# Patient Record
Sex: MALE | Race: WHITE | NOT HISPANIC OR LATINO | Employment: UNEMPLOYED | URBAN - METROPOLITAN AREA
[De-identification: names, ages, dates, MRNs, and addresses within clinical notes are randomized per-mention and may not be internally consistent; named-entity substitution may affect disease eponyms.]

---

## 2018-01-12 NOTE — MISCELLANEOUS
Discussion/Summary  Discussion Summary:   NARENDRA visit was canceled by patient  Chief Complaint  Chief Complaint Free Text Note Form: NARENDAR: PT was admitted to [facility] from 11/13/2016 through 11/15/2016  Dx: Abscess of left hand  I&D  Spoke with pt who reports he is better and doing ok  Denies hand pain, or fever  Incision is covered, dry and clean  Follow up with pcp scheduled for 11/25/2016 @ 10am       History of Present Illness  TCM Communication [facility]: The patient is being contacted for follow-up after hospitalization and 11/15/2016  He was hospitalized at [facility]  The dates of hospitalization:, date of admission: 11/13/2016, date of discharge: 11/15/2016  Diagnosis: see note  He was discharged to home  Medications were not reviewed today  He scheduled a follow up appointment  Counseling was provided to the patient  Topics counseled included importance of compliance with treatment  Communication performed and completed by Shannon Acevedo      Active Problems    1  Dyshidrotic eczema (705 81) (L30 1)   2  Elevated LFTs (790 6) (R79 89)   3  Essential hypertension (401 9) (I10)   4  Hyperlipemia (272 4) (E78 5)    Family History  Mother    1  Family history of hypertension (V17 49) (Z82 49)    Social History    · Alcohol ingestion, 1-4 drinks/week (V69 8) (Z78 9)   · Current every day smoker (305 1) (F17 200)   ·     Current Meds   1  Losartan Potassium-HCTZ 50-12 5 MG Oral Tablet; take 1 tablet by mouth every day; Therapy: 62Urv7931 to (Evaluate:90Jua1643)  Requested for: 05BQJ3877; Last   Rx:[id number] Ordered    Allergies    1  No Known Drug Allergies    Future Appointments    Date/Time Provider Specialty Site   12/22/2016 07:30 AM URIAH Rodriguez  Family Medicine [address] [place] [address]     Signatures   Electronically signed by :  URIAH Jung ; Dec  4 2016 11:49PM EST                       (Author)

## 2018-01-14 NOTE — PROCEDURES
Procedures by Rosemary Carter MD at 11/14/2016  1:08 PM      Author:  Rosemary Carter MD Service:  Orthopedics Author Type:  Physician     Filed:  11/14/2016  1:11 PM Date of Service:  11/14/2016  1:08 PM Status:  Signed     :  Rosemary Carter MD (Physician)            I discussed lancing the superficial abscess with the patient  I discussed the procedure in detail including risks, benefits and alternatives  Risks include allergic reaction to anesthetic agent,  bleeding, persistent infection requiring formal debridement  Verbal consent was obtained  The area was prepped with iodine swab stick and alcohol  2mL of 1% lidocaine was infiltrated subcutaneously  An 11 blade scalpel was used to heather the central  area of the wound  There was a small amount of purulent drainage expressed  30mL of saline was irrigated through the site  A sterile bandage was applied  The patient tolerated the procedure without complication  Justus ALEXANDER    Nov 14 2016  1:12PM [facility] Standard Time

## 2018-01-18 NOTE — PROGRESS NOTES
Assessment    1  Dyshidrotic eczema (345 81) (L30 1)   2  Essential hypertension (401 9) (I10)    Plan  Dyshidrotic eczema    · Fluocinonide 0 05 % External Cream; APPLY SPARINGLY TO AFFECTED  AREA(S) 3 TIMES A DAY  Dyshidrotic eczema, Essential hypertension    · (1) CBC/ PLT (NO DIFF); Status:Active; Requested for:86Iml6689;    · (1) COMPREHENSIVE METABOLIC PANEL; Status:Active; Requested for:25Bkj8176;    · (1) HEMOGLOBIN A1C; Status:Active; Requested for:60Nvd9604;    · (1) LIPID PANEL FASTING W DIRECT LDL REFLEX; Status:Active; Requested  for:23Aug2016;    · (1) TSH; Status:Active; Requested for:23Aug2016;   Essential hypertension    · Losartan Potassium-HCTZ 50-12 5 MG Oral Tablet; take 1 tablet by mouth every  day    Discussion/Summary  Discussion Summary:   Patient presents to establish some to the practice  He was not seen by our office for over 10 years  Hypertension  Blood pressure is elevated  Patient is asymptomatic  He is overweight, smokes few cigarettes daily, not interested in quitting at present time  Strong family history of hypertension  We will proceed with blood work as outlined above  We will start him on Hyzaar  Mechanism of action and side effects discussed  Should patient develop any orthostatic lightheadedness and fatigue-he will contact me immediately  Dyshidrotic eczema-we will try high potency steroid cream   Follow-up pending blood work results and in one month's period  Counseling Documentation With Imm: The patient was counseled regarding instructions for management, risk factor reductions, patient and family education, impressions, risks and benefits of treatment options, importance of compliance with treatment  Chief Complaint  Chief Complaint Free Text Note Form: Pt is here for an appt to become re-established with the practice  Pt states that he had been having problems with HTN a few years ago and wanted to be checked again   Pt states that he has been feeling well though  All meds/allergies reviewed with pt  History of Present Illness  HPI: elevated B/P during recent CDL physical   Reportedly patient's systolic blood pressure was up to 180 at the time  Patient admits to significant weight gain being a   He recently quit this job  He denies chest pain, palpitations, shortness of breath, dizziness or headaches  No recent blood work no daily medications  Strong family history of hypertension, mother and sister  Patient is also complaining of pruritic rash B/L palms , left > Right and bilateral inner ankles  Hypertension (Follow-Up): The patient presents for follow-up of essential hypertension  Symptoms:      Review of Systems  Complete-Male:   Constitutional: recent weight gain, but as noted in HPI, not feeling poorly and not feeling tired  Eyes: No complaints of eye pain, no red eyes, no discharge from eyes, no itchy eyes  ENT: no complaints of earache, no hearing loss, no nosebleeds, no nasal discharge, no sore throat, no hoarseness  Cardiovascular: No complaints of slow heart rate, no fast heart rate, no chest pain, no palpitations, no leg claudication, no lower extremity  Respiratory: No complaints of shortness of breath, no wheezing, no cough, no SOB on exertion, no orthopnea or PND  Gastrointestinal: No complaints of abdominal pain, no constipation, no nausea or vomiting, no diarrhea or bloody stools  Genitourinary: No complaints of dysuria, no incontinence, no hesitancy, no nocturia, no genital lesion, no testicular pain  Musculoskeletal: No complaints of arthralgia, no myalgias, no joint swelling or stiffness, no limb pain or swelling  Integumentary: a rash and itching, but as noted in HPI  Neurological: No compliants of headache, no confusion, no convulsions, no numbness or tingling, no dizziness or fainting, no limb weakness, no difficulty walking     Psychiatric: Is not suicidal, no sleep disturbances, no anxiety or depression, no change in personality, no emotional problems  Endocrine: No complaints of proptosis, no hot flashes, no muscle weakness, no erectile dysfunction, no deepening of the voice, no feelings of weakness  Hematologic/Lymphatic: No complaints of swollen glands, no swollen glands in the neck, does not bleed easily, no easy bruising  Past Medical History  Active Problems And Past Medical History Reviewed: The active problems and past medical history were reviewed and updated today  Family History  Mother    1  Family history of hypertension (V17 49) (Z82 49)  Family History Reviewed: The family history was reviewed and updated today  Social History    · Alcohol ingestion, 1-4 drinks/week (V69 8) (Z72 89)   · Current every day smoker (305 1) (F17 200)   ·   Social History Reviewed: The social history was reviewed and updated today  Current Meds   1  No Reported Medications Recorded    Allergies    1  No Known Drug Allergies    Vitals  Vital Signs    Recorded: 16Muq7784 11:46AM Recorded: 77TCC6946 14:69UW   Systolic 370 150   Diastolic 90 84   Heart Rate  72   Temperature  98 5 F   Height  5 ft 6 in   Weight  199 lb 2 08 oz   BMI Calculated  32 14   BSA Calculated  2     Physical Exam    Constitutional   General appearance: No acute distress, well appearing and well nourished  well developed and overweight  Eyes   Conjunctiva and lids: No swelling, erythema, or discharge  Ears, Nose, Mouth, and Throat   Oropharynx: Normal with no erythema, edema, exudate or lesions  Pulmonary   Respiratory effort: No increased work of breathing or signs of respiratory distress  Auscultation of lungs: Clear to auscultation, equal breath sounds bilaterally, no wheezes, no rales, no rhonci  Cardiovascular   Auscultation of heart: Normal rate and rhythm, normal S1 and S2, without murmurs      Examination of extremities for edema and/or varicosities: Normal     Carotid pulses: Normal  Abdomen   Abdomen: Non-tender, no masses  Liver and spleen: No hepatomegaly or splenomegaly  Musculoskeletal   Gait and station: Normal     Skin   Skin and subcutaneous tissue: Abnormal   Scaly pruritic dyshidrotic eczema bilateral palms, eczema pruritic excoriated rash bilateral inner ankles  Neurologic   Cranial nerves: Cranial nerves 2-12 intact  Psychiatric   Orientation to person, place and time: Normal     Mood and affect: Normal          Results/Data  PHQ-2 Adult Depression Screening 89Kgi0891 11:02AM User, s     Test Name Result Flag Reference   PHQ-2 Adult Depression Score 0     Over the last two weeks, how often have you been bothered by any of the following problems? Little interest or pleasure in doing things: Not at all - 0  Feeling down, depressed, or hopeless: Not at all - 0   PHQ-2 Adult Depression Screening Negative         Future Appointments    Date/Time Provider Specialty Site   09/22/2016 07:30 AM URIAH Sprague  Family Medicine [address] [place] [address]     Signatures   Electronically signed by :  URIAH Cordero ; Aug 24 2016 11:40PM EST                       (Author)

## 2018-03-15 DIAGNOSIS — I10 ESSENTIAL HYPERTENSION: Primary | ICD-10-CM

## 2018-03-16 RX ORDER — LOSARTAN POTASSIUM AND HYDROCHLOROTHIAZIDE 12.5; 5 MG/1; MG/1
1 TABLET ORAL DAILY
Qty: 15 TABLET | Refills: 0 | Status: SHIPPED | OUTPATIENT
Start: 2018-03-16 | End: 2021-08-06

## 2018-03-16 NOTE — TELEPHONE ENCOUNTER
Please contact patient  I received refill request from his pharmacy regarding medication for hypertension  I have not seen him in the office since 2016  I cannot continue refilling blood pressure medication without current follow-up in office visit    Please clarify and  let me know

## 2021-07-30 ENCOUNTER — APPOINTMENT (EMERGENCY)
Dept: CT IMAGING | Facility: HOSPITAL | Age: 46
DRG: 897 | End: 2021-07-30

## 2021-07-30 ENCOUNTER — HOSPITAL ENCOUNTER (INPATIENT)
Facility: HOSPITAL | Age: 46
LOS: 2 days | Discharge: HOME | DRG: 897 | End: 2021-08-02
Attending: EMERGENCY MEDICINE | Admitting: INTERNAL MEDICINE

## 2021-07-30 DIAGNOSIS — F10.239 ALCOHOL WITHDRAWAL SEIZURE (HCC): ICD-10-CM

## 2021-07-30 DIAGNOSIS — F10.239 ALCOHOL WITHDRAWAL (HCC): ICD-10-CM

## 2021-07-30 DIAGNOSIS — R56.9 ALCOHOL WITHDRAWAL SEIZURE (HCC): ICD-10-CM

## 2021-07-30 DIAGNOSIS — I10 ESSENTIAL HYPERTENSION: ICD-10-CM

## 2021-07-30 DIAGNOSIS — R56.9 SEIZURE (HCC): Primary | ICD-10-CM

## 2021-07-30 LAB
ALBUMIN SERPL BCP-MCNC: 4.2 G/DL (ref 3.5–5)
ALP SERPL-CCNC: 246 U/L (ref 46–116)
ALT SERPL W P-5'-P-CCNC: 81 U/L (ref 12–78)
ANION GAP SERPL CALCULATED.3IONS-SCNC: 15 MMOL/L (ref 4–13)
AST SERPL W P-5'-P-CCNC: 106 U/L (ref 5–45)
BASOPHILS # BLD AUTO: 0.05 THOUSANDS/ΜL (ref 0–0.1)
BASOPHILS NFR BLD AUTO: 1 % (ref 0–1)
BILIRUB SERPL-MCNC: 3.38 MG/DL (ref 0.2–1)
BUN SERPL-MCNC: 8 MG/DL (ref 5–25)
CALCIUM SERPL-MCNC: 9.5 MG/DL (ref 8.3–10.1)
CHLORIDE SERPL-SCNC: 94 MMOL/L (ref 100–108)
CO2 SERPL-SCNC: 24 MMOL/L (ref 21–32)
CREAT SERPL-MCNC: 1.06 MG/DL (ref 0.6–1.3)
EOSINOPHIL # BLD AUTO: 0.07 THOUSAND/ΜL (ref 0–0.61)
EOSINOPHIL NFR BLD AUTO: 1 % (ref 0–6)
ERYTHROCYTE [DISTWIDTH] IN BLOOD BY AUTOMATED COUNT: 12.7 % (ref 11.6–15.1)
ETHANOL SERPL-MCNC: <3 MG/DL (ref 0–3)
GFR SERPL CREATININE-BSD FRML MDRD: 83 ML/MIN/1.73SQ M
GLUCOSE SERPL-MCNC: 269 MG/DL (ref 65–140)
GLUCOSE SERPL-MCNC: 273 MG/DL (ref 65–140)
HCT VFR BLD AUTO: 44.3 % (ref 36.5–49.3)
HGB BLD-MCNC: 16.5 G/DL (ref 12–17)
HOLD SPECIMEN: NORMAL
IMM GRANULOCYTES # BLD AUTO: 0.05 THOUSAND/UL (ref 0–0.2)
IMM GRANULOCYTES NFR BLD AUTO: 1 % (ref 0–2)
LIPASE SERPL-CCNC: 196 U/L (ref 73–393)
LYMPHOCYTES # BLD AUTO: 1.55 THOUSANDS/ΜL (ref 0.6–4.47)
LYMPHOCYTES NFR BLD AUTO: 17 % (ref 14–44)
MCH RBC QN AUTO: 36.5 PG (ref 26.8–34.3)
MCHC RBC AUTO-ENTMCNC: 37.2 G/DL (ref 31.4–37.4)
MCV RBC AUTO: 98 FL (ref 82–98)
MONOCYTES # BLD AUTO: 0.41 THOUSAND/ΜL (ref 0.17–1.22)
MONOCYTES NFR BLD AUTO: 4 % (ref 4–12)
NEUTROPHILS # BLD AUTO: 7.16 THOUSANDS/ΜL (ref 1.85–7.62)
NEUTS SEG NFR BLD AUTO: 76 % (ref 43–75)
NRBC BLD AUTO-RTO: 0 /100 WBCS
PLATELET # BLD AUTO: 219 THOUSANDS/UL (ref 149–390)
PMV BLD AUTO: 9.2 FL (ref 8.9–12.7)
POTASSIUM SERPL-SCNC: 3.3 MMOL/L (ref 3.5–5.3)
PROT SERPL-MCNC: 8 G/DL (ref 6.4–8.2)
RBC # BLD AUTO: 4.52 MILLION/UL (ref 3.88–5.62)
SODIUM SERPL-SCNC: 133 MMOL/L (ref 136–145)
TROPONIN I SERPL-MCNC: <0.02 NG/ML
WBC # BLD AUTO: 9.29 THOUSAND/UL (ref 4.31–10.16)

## 2021-07-30 PROCEDURE — 85025 COMPLETE CBC W/AUTO DIFF WBC: CPT | Performed by: EMERGENCY MEDICINE

## 2021-07-30 PROCEDURE — 80061 LIPID PANEL: CPT | Performed by: NURSE PRACTITIONER

## 2021-07-30 PROCEDURE — 82077 ASSAY SPEC XCP UR&BREATH IA: CPT | Performed by: EMERGENCY MEDICINE

## 2021-07-30 PROCEDURE — 84484 ASSAY OF TROPONIN QUANT: CPT | Performed by: EMERGENCY MEDICINE

## 2021-07-30 PROCEDURE — 96365 THER/PROPH/DIAG IV INF INIT: CPT

## 2021-07-30 PROCEDURE — 93005 ELECTROCARDIOGRAM TRACING: CPT

## 2021-07-30 PROCEDURE — 83036 HEMOGLOBIN GLYCOSYLATED A1C: CPT | Performed by: NURSE PRACTITIONER

## 2021-07-30 PROCEDURE — 82948 REAGENT STRIP/BLOOD GLUCOSE: CPT

## 2021-07-30 PROCEDURE — 99285 EMERGENCY DEPT VISIT HI MDM: CPT | Performed by: EMERGENCY MEDICINE

## 2021-07-30 PROCEDURE — 99285 EMERGENCY DEPT VISIT HI MDM: CPT

## 2021-07-30 PROCEDURE — 36415 COLL VENOUS BLD VENIPUNCTURE: CPT

## 2021-07-30 PROCEDURE — 96375 TX/PRO/DX INJ NEW DRUG ADDON: CPT

## 2021-07-30 PROCEDURE — 80053 COMPREHEN METABOLIC PANEL: CPT | Performed by: EMERGENCY MEDICINE

## 2021-07-30 PROCEDURE — 83690 ASSAY OF LIPASE: CPT | Performed by: EMERGENCY MEDICINE

## 2021-07-30 PROCEDURE — 70450 CT HEAD/BRAIN W/O DYE: CPT

## 2021-07-30 RX ORDER — PHENOBARBITAL SODIUM 65 MG/ML
65 INJECTION INTRAMUSCULAR ONCE
Status: COMPLETED | OUTPATIENT
Start: 2021-07-30 | End: 2021-07-30

## 2021-07-30 RX ORDER — DIAZEPAM 5 MG/1
10 TABLET ORAL ONCE
Status: COMPLETED | OUTPATIENT
Start: 2021-07-30 | End: 2021-07-30

## 2021-07-30 RX ADMIN — DIAZEPAM 10 MG: 5 TABLET ORAL at 23:33

## 2021-07-30 RX ADMIN — SODIUM CHLORIDE 1000 ML: 0.9 INJECTION, SOLUTION INTRAVENOUS at 23:34

## 2021-07-30 RX ADMIN — THIAMINE HYDROCHLORIDE 500 MG: 100 INJECTION, SOLUTION INTRAMUSCULAR; INTRAVENOUS at 23:34

## 2021-07-30 RX ADMIN — PHENOBARBITAL SODIUM 65 MG: 65 INJECTION INTRAMUSCULAR; INTRAVENOUS at 23:33

## 2021-07-31 PROBLEM — E87.29 INCREASED ANION GAP METABOLIC ACIDOSIS: Status: ACTIVE | Noted: 2021-07-31

## 2021-07-31 PROBLEM — E87.6 HYPOKALEMIA: Status: ACTIVE | Noted: 2021-07-31

## 2021-07-31 PROBLEM — R73.9 ELEVATED SERUM GLUCOSE: Status: ACTIVE | Noted: 2021-07-31

## 2021-07-31 PROBLEM — E87.2 INCREASED ANION GAP METABOLIC ACIDOSIS: Status: ACTIVE | Noted: 2021-07-31

## 2021-07-31 PROBLEM — F10.939 ALCOHOL WITHDRAWAL SEIZURE (HCC): Status: ACTIVE | Noted: 2021-07-31

## 2021-07-31 PROBLEM — F10.239 ALCOHOL WITHDRAWAL SEIZURE (HCC): Status: ACTIVE | Noted: 2021-07-31

## 2021-07-31 PROBLEM — R56.9 ALCOHOL WITHDRAWAL SEIZURE (HCC): Status: ACTIVE | Noted: 2021-07-31

## 2021-07-31 PROBLEM — R74.01 TRANSAMINITIS: Status: ACTIVE | Noted: 2021-07-31

## 2021-07-31 PROBLEM — R79.89 ELEVATED LFTS: Status: ACTIVE | Noted: 2021-07-31

## 2021-07-31 LAB
ALBUMIN SERPL BCP-MCNC: 2.7 G/DL (ref 3.5–5)
ALP SERPL-CCNC: 195 U/L (ref 46–116)
ALT SERPL W P-5'-P-CCNC: 68 U/L (ref 12–78)
ANION GAP SERPL CALCULATED.3IONS-SCNC: 10 MMOL/L (ref 4–13)
AST SERPL W P-5'-P-CCNC: 91 U/L (ref 5–45)
BASOPHILS # BLD AUTO: 0.04 THOUSANDS/ΜL (ref 0–0.1)
BASOPHILS NFR BLD AUTO: 1 % (ref 0–1)
BILIRUB SERPL-MCNC: 2.36 MG/DL (ref 0.2–1)
BUN SERPL-MCNC: 7 MG/DL (ref 5–25)
CALCIUM ALBUM COR SERPL-MCNC: 9.9 MG/DL (ref 8.3–10.1)
CALCIUM SERPL-MCNC: 8.9 MG/DL (ref 8.3–10.1)
CHLORIDE SERPL-SCNC: 100 MMOL/L (ref 100–108)
CHOLEST SERPL-MCNC: 327 MG/DL (ref 50–200)
CO2 SERPL-SCNC: 26 MMOL/L (ref 21–32)
CREAT SERPL-MCNC: 1.04 MG/DL (ref 0.6–1.3)
EOSINOPHIL # BLD AUTO: 0.06 THOUSAND/ΜL (ref 0–0.61)
EOSINOPHIL NFR BLD AUTO: 1 % (ref 0–6)
ERYTHROCYTE [DISTWIDTH] IN BLOOD BY AUTOMATED COUNT: 12.8 % (ref 11.6–15.1)
EST. AVERAGE GLUCOSE BLD GHB EST-MCNC: 123 MG/DL
GFR SERPL CREATININE-BSD FRML MDRD: 85 ML/MIN/1.73SQ M
GLUCOSE SERPL-MCNC: 139 MG/DL (ref 65–140)
GLUCOSE SERPL-MCNC: 141 MG/DL (ref 65–140)
GLUCOSE SERPL-MCNC: 149 MG/DL (ref 65–140)
GLUCOSE SERPL-MCNC: 157 MG/DL (ref 65–140)
GLUCOSE SERPL-MCNC: 167 MG/DL (ref 65–140)
GLUCOSE SERPL-MCNC: 190 MG/DL (ref 65–140)
HAV IGM SER QL: NORMAL
HBA1C MFR BLD: 5.9 %
HBV CORE IGM SER QL: NORMAL
HBV SURFACE AG SER QL: NORMAL
HCT VFR BLD AUTO: 39.7 % (ref 36.5–49.3)
HCV AB SER QL: NORMAL
HDLC SERPL-MCNC: 81 MG/DL
HGB BLD-MCNC: 14 G/DL (ref 12–17)
IMM GRANULOCYTES # BLD AUTO: 0.03 THOUSAND/UL (ref 0–0.2)
IMM GRANULOCYTES NFR BLD AUTO: 0 % (ref 0–2)
LDLC SERPL CALC-MCNC: 192 MG/DL (ref 0–100)
LYMPHOCYTES # BLD AUTO: 1.39 THOUSANDS/ΜL (ref 0.6–4.47)
LYMPHOCYTES NFR BLD AUTO: 21 % (ref 14–44)
MCH RBC QN AUTO: 35.6 PG (ref 26.8–34.3)
MCHC RBC AUTO-ENTMCNC: 35.3 G/DL (ref 31.4–37.4)
MCV RBC AUTO: 101 FL (ref 82–98)
MONOCYTES # BLD AUTO: 0.38 THOUSAND/ΜL (ref 0.17–1.22)
MONOCYTES NFR BLD AUTO: 6 % (ref 4–12)
NEUTROPHILS # BLD AUTO: 4.77 THOUSANDS/ΜL (ref 1.85–7.62)
NEUTS SEG NFR BLD AUTO: 71 % (ref 43–75)
NRBC BLD AUTO-RTO: 0 /100 WBCS
PLATELET # BLD AUTO: 166 THOUSANDS/UL (ref 149–390)
PMV BLD AUTO: 9.5 FL (ref 8.9–12.7)
POTASSIUM SERPL-SCNC: 3.1 MMOL/L (ref 3.5–5.3)
PROT SERPL-MCNC: 6.6 G/DL (ref 6.4–8.2)
RBC # BLD AUTO: 3.93 MILLION/UL (ref 3.88–5.62)
SODIUM SERPL-SCNC: 136 MMOL/L (ref 136–145)
TRIGL SERPL-MCNC: 269 MG/DL
WBC # BLD AUTO: 6.67 THOUSAND/UL (ref 4.31–10.16)

## 2021-07-31 PROCEDURE — 80074 ACUTE HEPATITIS PANEL: CPT | Performed by: NURSE PRACTITIONER

## 2021-07-31 PROCEDURE — 99448 NTRPROF PH1/NTRNET/EHR 21-30: CPT | Performed by: EMERGENCY MEDICINE

## 2021-07-31 PROCEDURE — 36415 COLL VENOUS BLD VENIPUNCTURE: CPT | Performed by: NURSE PRACTITIONER

## 2021-07-31 PROCEDURE — 96367 TX/PROPH/DG ADDL SEQ IV INF: CPT

## 2021-07-31 PROCEDURE — 82948 REAGENT STRIP/BLOOD GLUCOSE: CPT

## 2021-07-31 PROCEDURE — 85025 COMPLETE CBC W/AUTO DIFF WBC: CPT | Performed by: NURSE PRACTITIONER

## 2021-07-31 PROCEDURE — 96361 HYDRATE IV INFUSION ADD-ON: CPT

## 2021-07-31 PROCEDURE — 99223 1ST HOSP IP/OBS HIGH 75: CPT | Performed by: HOSPITALIST

## 2021-07-31 PROCEDURE — 80053 COMPREHEN METABOLIC PANEL: CPT | Performed by: NURSE PRACTITIONER

## 2021-07-31 RX ORDER — PHENOBARBITAL SODIUM 65 MG/ML
65 INJECTION INTRAMUSCULAR ONCE
Status: DISCONTINUED | OUTPATIENT
Start: 2021-07-31 | End: 2021-07-31

## 2021-07-31 RX ORDER — POTASSIUM CHLORIDE 20 MEQ/1
40 TABLET, EXTENDED RELEASE ORAL ONCE
Status: COMPLETED | OUTPATIENT
Start: 2021-07-31 | End: 2021-07-31

## 2021-07-31 RX ORDER — AMLODIPINE BESYLATE 5 MG/1
5 TABLET ORAL DAILY
Status: DISCONTINUED | OUTPATIENT
Start: 2021-07-31 | End: 2021-08-02 | Stop reason: HOSPADM

## 2021-07-31 RX ADMIN — POTASSIUM CHLORIDE 40 MEQ: 1500 TABLET, EXTENDED RELEASE ORAL at 04:41

## 2021-07-31 RX ADMIN — INSULIN LISPRO 1 UNITS: 100 INJECTION, SOLUTION INTRAVENOUS; SUBCUTANEOUS at 17:21

## 2021-07-31 RX ADMIN — AMLODIPINE BESYLATE 5 MG: 5 TABLET ORAL at 09:49

## 2021-07-31 RX ADMIN — INSULIN LISPRO 1 UNITS: 100 INJECTION, SOLUTION INTRAVENOUS; SUBCUTANEOUS at 22:19

## 2021-07-31 RX ADMIN — INSULIN LISPRO 1 UNITS: 100 INJECTION, SOLUTION INTRAVENOUS; SUBCUTANEOUS at 12:12

## 2021-07-31 RX ADMIN — FOLIC ACID 1 MG: 5 INJECTION, SOLUTION INTRAMUSCULAR; INTRAVENOUS; SUBCUTANEOUS at 00:42

## 2021-07-31 RX ADMIN — THIAMINE HYDROCHLORIDE: 100 INJECTION, SOLUTION INTRAMUSCULAR; INTRAVENOUS at 09:51

## 2021-07-31 RX ADMIN — POTASSIUM CHLORIDE 40 MEQ: 1500 TABLET, EXTENDED RELEASE ORAL at 09:49

## 2021-07-31 NOTE — ASSESSMENT & PLAN NOTE
· Patient denies history DM  · Serum glucose of 273 on CMP  · Obtain A1c  · Accu-checks and SSI  · Hypoglycemia protocol

## 2021-07-31 NOTE — PLAN OF CARE
Problem: Potential for Falls  Goal: Patient will remain free of falls  Description: INTERVENTIONS:  - Educate patient/family on patient safety including physical limitations  - Instruct patient to call for assistance with activity   - Consult OT/PT to assist with strengthening/mobility   - Keep Call bell within reach  - Keep bed low and locked with side rails adjusted as appropriate  - Keep care items and personal belongings within reach  - Initiate and maintain comfort rounds  - Make Fall Risk Sign visible to staff  - Offer Toileting every  Hours, in advance of need  - Initiate/Maintain alarm  - Obtain necessary fall risk management equipment:   - Apply yellow socks and bracelet for high fall risk patients  - Consider moving patient to room near nurses station  Outcome: Progressing     Problem: MOBILITY - ADULT  Goal: Maintain or return to baseline ADL function  Description: INTERVENTIONS:  -  Assess patient's ability to carry out ADLs; assess patient's baseline for ADL function and identify physical deficits which impact ability to perform ADLs (bathing, care of mouth/teeth, toileting, grooming, dressing, etc )  - Assess/evaluate cause of self-care deficits   - Assess range of motion  - Assess patient's mobility; develop plan if impaired  - Assess patient's need for assistive devices and provide as appropriate  - Encourage maximum independence but intervene and supervise when necessary  - Involve family in performance of ADLs  - Assess for home care needs following discharge   - Consider OT consult to assist with ADL evaluation and planning for discharge  - Provide patient education as appropriate  Outcome: Progressing  Goal: Maintains/Returns to pre admission functional level  Description: INTERVENTIONS:  - Perform BMAT or MOVE assessment daily    - Set and communicate daily mobility goal to care team and patient/family/caregiver     - Collaborate with rehabilitation services on mobility goals if consulted  - Perform Range of Motion  times a day  - Reposition patient every  hours    - Dangle patient  times a day  - Stand patient  times a day  - Ambulate patient  times a day  - Out of bed to chair  times a day   - Out of bed for meals  times a day  - Out of bed for toileting  - Record patient progress and toleration of activity level   Outcome: Progressing     Problem: PAIN - ADULT  Goal: Verbalizes/displays adequate comfort level or baseline comfort level  Description: Interventions:  - Encourage patient to monitor pain and request assistance  - Assess pain using appropriate pain scale  - Administer analgesics based on type and severity of pain and evaluate response  - Implement non-pharmacological measures as appropriate and evaluate response  - Consider cultural and social influences on pain and pain management  - Notify physician/advanced practitioner if interventions unsuccessful or patient reports new pain  Outcome: Progressing     Problem: INFECTION - ADULT  Goal: Absence or prevention of progression during hospitalization  Description: INTERVENTIONS:  - Assess and monitor for signs and symptoms of infection  - Monitor lab/diagnostic results  - Monitor all insertion sites, i e  indwelling lines, tubes, and drains  - Monitor endotracheal if appropriate and nasal secretions for changes in amount and color  - [place] appropriate cooling/warming therapies per order  - Administer medications as ordered  - Instruct and encourage patient and family to use good hand hygiene technique  - Identify and instruct in appropriate isolation precautions for identified infection/condition  Outcome: Progressing  Goal: Absence of fever/infection during neutropenic period  Description: INTERVENTIONS:  - Monitor WBC    Outcome: Progressing     Problem: SAFETY ADULT  Goal: Patient will remain free of falls  Description: INTERVENTIONS:  - Educate patient/family on patient safety including physical limitations  - Instruct patient to call for assistance with activity   - Consult OT/PT to assist with strengthening/mobility   - Keep Call bell within reach  - Keep bed low and locked with side rails adjusted as appropriate  - Keep care items and personal belongings within reach  - Initiate and maintain comfort rounds  - Make Fall Risk Sign visible to staff  - Offer Toileting every  Hours, in advance of need  - Initiate/Maintain alarm  - Obtain necessary fall risk management equipment:   - Apply yellow socks and bracelet for high fall risk patients  - Consider moving patient to room near nurses station  Outcome: Progressing  Goal: Maintain or return to baseline ADL function  Description: INTERVENTIONS:  -  Assess patient's ability to carry out ADLs; assess patient's baseline for ADL function and identify physical deficits which impact ability to perform ADLs (bathing, care of mouth/teeth, toileting, grooming, dressing, etc )  - Assess/evaluate cause of self-care deficits   - Assess range of motion  - Assess patient's mobility; develop plan if impaired  - Assess patient's need for assistive devices and provide as appropriate  - Encourage maximum independence but intervene and supervise when necessary  - Involve family in performance of ADLs  - Assess for home care needs following discharge   - Consider OT consult to assist with ADL evaluation and planning for discharge  - Provide patient education as appropriate  Outcome: Progressing  Goal: Maintains/Returns to pre admission functional level  Description: INTERVENTIONS:  - Perform BMAT or MOVE assessment daily    - Set and communicate daily mobility goal to care team and patient/family/caregiver  - Collaborate with rehabilitation services on mobility goals if consulted  - Perform Range of Motion  times a day  - Reposition patient every  hours    - Dangle patient  times a day  - Stand patient  times a day  - Ambulate patient  times a day  - Out of bed to chair  times a day   - Out of bed for meals times a day  - Out of bed for toileting  - Record patient progress and toleration of activity level   Outcome: Progressing     Problem: DISCHARGE PLANNING  Goal: Discharge to home or other facility with appropriate resources  Description: INTERVENTIONS:  - Identify barriers to discharge w/patient and caregiver  - Arrange for needed discharge resources and transportation as appropriate  - Identify discharge learning needs (meds, wound care, etc )  - Arrange for interpretive services to assist at discharge as needed  - Refer to Case Management Department for coordinating discharge planning if the patient needs post-hospital services based on physician/advanced practitioner order or complex needs related to functional status, cognitive ability, or social support system  Outcome: Progressing     Problem: Knowledge Deficit  Goal: Patient/family/caregiver demonstrates understanding of disease process, treatment plan, medications, and discharge instructions  Description: Complete learning assessment and assess knowledge base    Interventions:  - Provide teaching at level of understanding  - Provide teaching via preferred learning methods  Outcome: Progressing

## 2021-07-31 NOTE — H&P
Mannygrzegorz 1975, 51 y o  male MRN: [id number]  Unit/Bed#: S -01 Encounter: [id number]  Primary Care Provider: Ame Grewal MD   Date and time admitted to hospital: 7/30/2021  9:16 PM    * Alcohol withdrawal seizure [facility])  Assessment & Plan  · Presentation: Patient presented to ED via EMS due to new seizure like activity  Per wife, patient was reportedly at home watching TV and clenched up and began shaking for an unknown duration  Patient was foaming at the mouth  Patient did not strike his head  Patient denies bowel or urine incontinence  · Patient has been drinking for 10 years  Patient reported to be that he drinks vodka once or twice per week; however, per ED provider he reported he drinks 1/2 bottle of vodka per day  Patient reported that when he drinks he sometimes consumes an entire bottle of vodka  He reported to be that his last alcoholic drink was on Monday, 07/26/2021  Per ED provider, patient reported his last drink was on 07/29/2021  Patient denies withdrawal symptoms in the past    · Patient is interested in ETOH cessation  He states he can do it on his own and is not interested in rehab  · CT head: "No acute intracranial abnormality "  · Patient received 10 mg of IV Valium and 65 mg of Phenobarbital in ED  · Continue with CIWA protocol  · Neuro checks q4h  · Seizure precautions  · Aspiration precautions  · Monitor on telemetry  · Continue thiamine, folic acid and MV  · CM consult  · Tox consult  Elevated LFTs  Assessment & Plan  · Present on admission, , ALT 81, Alkaline phosphatase 246, total bilirubin 3 38  · Suspect in the setting of ETOH abuse  · Trend CMP      Essential hypertension  Assessment & Plan  Blood pressure initially elevated on arrival with readings of 160s-180s/100s; patient did not receive any antihypertensive medication in the ED but he did receive Phenobarbital in ED, BP improved post administration  Last recorded pressure: 158/100  Patient reports that he does not take any antihypertensives at this time as an outpatient  He reports that several years ago he took Hyzaar for several weeks but stopped due to having headaches after starting the medication  Initiate 5 mg of amlodipine  Monitor blood pressure  Elevated serum glucose  Assessment & Plan  · Patient denies history DM  · Serum glucose of 273 on CMP  · Obtain A1c  · Accu-checks and SSI  · Hypoglycemia protocol  Hypokalemia  Assessment & Plan   Potassium level upon admission: 3 3   Replete and monitor BMP  VTE Pharmacologic Prophylaxis: VTE Score: 2 Low Risk (Score 0-2) - Encourage Ambulation  Code Status: Level 1 - Full Code per patient    Anticipated Length of Stay: Patient will be admitted on an inpatient basis with an anticipated length of stay of greater than 2 midnights secondary to ETOH withdrawal     Total Time for Visit, including Counseling / Coordination of Care: 70 minutes Greater than 50% of this total time spent on direct patient counseling and coordination of care  Chief Complaint: Seizure like activity    History of Present Illness:  Nirmal Tomas is a 51 y o  male with a PMH of alcohol abuse and HTN who presents with seizure like activity  Patient presented to ED via EMS due to new seizure like activity  Per wife, patient was reportedly at home watching TV and clenched up and began shaking for an unknown duration  Patient was foaming at the mouth  Patient did not strike his head  Patient denies bowel or urine incontinence  Patient has been drinking for 10 years  Patient reported to be that he drinks vodka once or twice per week; however, per ED provider he reported he drinks 1/2 bottle of vodka per day  Patient reported that when he drinks he sometimes consumes an entire bottle of vodka  He reported to be that his last alcoholic drink was on Monday, 07/26/2021   Per ED provider, patient reported his last drink was on 07/29/2021  Patient denies withdrawal symptoms in the past  Patient is interested in ETOH cessation  He states he can do it on his own and is not interested in rehab  Review of Systems:  Review of Systems   Respiratory: Negative for chest tightness  Cardiovascular: Negative for chest pain  Gastrointestinal: Negative for nausea and vomiting  Neurological: Positive for seizures  Negative for tremors  All other systems reviewed and are negative  Past Medical and Surgical History:   Past Medical History:   Diagnosis Date    Hypertension        History reviewed  No pertinent surgical history  Meds/Allergies:  Prior to Admission medications    Medication Sig Start Date End Date Taking? Authorizing Provider   bacitracin ointment Apply topically 2 (two) times a day  Patient not taking: Reported on 7/30/2021    Historical Provider, MD   losartan-hydrochlorothiazide (HYZAAR) 50-12 5 mg per tablet Take 1 tablet by mouth daily Please advise patient to contact our office to set up an office visit  Patient not taking: Reported on 7/30/2021 3/16/18   Liliana Donahue MD     Patient denies any daily medications as an outpatient  Allergies: No Known Allergies    Social History:  Marital Status: /Civil Union   Occupation: Unknown  Patient Pre-hospital Living Situation: Home  Patient Pre-hospital Level of Mobility: walks  Patient Pre-hospital Diet Restrictions: None  Substance Use History:   Social History     Substance and Sexual Activity   Alcohol Use Yes    Comment: socially     Social History     Tobacco Use   Smoking Status Heavy Tobacco Smoker    Packs/day: 0 50    Types: Cigarettes   Smokeless Tobacco Never Used     Social History     Substance and Sexual Activity   Drug Use No       Family History:  History reviewed  No pertinent family history      Physical Exam:     Vitals:   Blood Pressure: 158/100 (07/31/21 0300)  Pulse: 99 (07/31/21 0300)  Temperature: 98 4 °F (36 9 °C) (07/31/21 0300)  Temp Source: Oral (07/31/21 0300)  Respirations: 16 (07/31/21 0300)  Weight - Scale: 83 7 kg (184 lb 8 4 oz) (07/30/21 2118)  SpO2: 97 % (07/31/21 0300)    Physical Exam  Vitals and nursing note reviewed  Constitutional:       General: He is not in acute distress  Appearance: Normal appearance  He is obese  He is not ill-appearing or diaphoretic  HENT:      Head: Normocephalic and atraumatic  Nose: Nose normal       Mouth/Throat:      Pharynx: Oropharynx is clear  Eyes:      General: No scleral icterus  Conjunctiva/sclera: Conjunctivae normal    Cardiovascular:      Rate and Rhythm: Normal rate and regular rhythm  Pulses: Normal pulses  Heart sounds: Normal heart sounds  No murmur heard  Pulmonary:      Effort: Pulmonary effort is normal  No respiratory distress  Breath sounds: Normal breath sounds  No wheezing, rhonchi or rales  Chest:      Chest wall: No tenderness  Abdominal:      General: Bowel sounds are normal  There is no distension  Palpations: Abdomen is soft  Tenderness: There is no abdominal tenderness  Musculoskeletal:         General: No swelling  Normal range of motion  Cervical back: Normal range of motion  Skin:     General: Skin is warm and dry  Neurological:      Mental Status: He is alert and oriented to person, place, and time     Psychiatric:         Speech: Speech normal          Additional Data:     Lab Results:  Results from last 7 days   Lab Units 07/30/21  2132   WBC Thousand/uL 9 29   HEMOGLOBIN g/dL 16 5   HEMATOCRIT % 44 3   PLATELETS Thousands/uL 219   NEUTROS PCT % 76*   LYMPHS PCT % 17   MONOS PCT % 4   EOS PCT % 1     Results from last 7 days   Lab Units 07/30/21  2132   SODIUM mmol/L 133*   POTASSIUM mmol/L 3 3*   CHLORIDE mmol/L 94*   CO2 mmol/L 24   BUN mg/dL 8   CREATININE mg/dL 1 06   ANION GAP mmol/L 15*   CALCIUM mg/dL 9 5   ALBUMIN g/dL 4 2   TOTAL BILIRUBIN mg/dL 3 38*   ALK PHOS U/L 246*   ALT U/L 81*   AST U/L 106*   GLUCOSE RANDOM mg/dL 273*         Results from last 7 days   Lab Units 07/30/21  2133   POC GLUCOSE mg/dl 269*               Imaging: Reviewed radiology reports from this admission including: CT head  CT head without contrast   Final Result by Roxanna Webb MD (07/31 5657)      No acute intracranial abnormality  Workstation performed: [id number]             EKG and Other Studies Reviewed on Admission:   · EKG: Sinus Tachycardia    ** Please Note: This note has been constructed using a voice recognition system   **

## 2021-07-31 NOTE — CONSULTS
PHONE [facility] Toxicology  Pj Pimentel 51 y o  male MRN: [id number]  Unit/Bed#: S -01 Encounter: [id number]      Reason for Consult / Principal Problem: Seizure, alcohol use disorder    Inpatient consult to Toxicology  Consult performed by: Richard Silva MD  Consult ordered by: INESSA Jerome        07/31/21      ASSESSMENT:  1) Seizure  2) Alcohol use disorder  3) Elevated transaminases  4) Hypokalemia    DISCUSSION/ RECOMMENDATIONS:  The patient presented overnight after what was presumed to be new onset seizure  He also has history of frequent drinking, history is not entirely clear but it sounds like he may drink up to half a liter of vodka daily  Last drink was reported to be 4 days prior to presentation  The patient did receive 10 mg of diazepam and 65 mg of phenobarbital in the emergency department  He has not required further benzodiazepines or barbiturates since this time  In most cases of alcohol withdrawal seizure, we tend to see them in the setting of severe alcohol withdrawal and the patient does not meet this picture at this time  However there are rare cases where seizure can actually be first manifestation of alcohol withdrawal, or were seizures can be seen even in the setting of mild withdrawal   It is possible that this is such a case, however given the rarity of such presentation, I would recommend neurology consultation for consideration of other possible cause of seizure  I would noted is possible the patient could have propensity for seizures, and that cessation of alcohol use lowered seizure threshold  Recommend daily thiamine folate, continued treatment with benzodiazepines as needed per [facility] protocol, and consideration of rehab placement if the patient feels he would benefit from this  Would not recommend use of phenobarbital on the floor due to potential for irreversible respiratory depression    If there are any complications with his treatment going forward, please feel free to contact me to discuss other possible treatment modalities  I expect elevated transaminases are due to chronic alcohol use  Mild elevation only, and appears to be stable  For further questions, please call [facility]  Service or Patient Access Center to reach the medical  on call  Hx and PE limited by the dynamics of a phone consultation  I have not personally interviewed or evaluated the patient, but only advised based on the information provided to me  Primary provider is responsible for all clinical decisions  Pertinent history, physical exam and clinical findings and course discussed: Korin Herrera is a 51y o  year old male who presented with presumed new onset seizure which was witnessed at home  Patient does have use of frequent alcohol use, history is not entirely clear but it sounds like he may drink up to half a liter of vodka daily  Review of systems and physical exam not performed by me  Historical Information   Past Medical History:   Diagnosis Date    Hypertension      History reviewed  No pertinent surgical history  Social History   Social History     Substance and Sexual Activity   Alcohol Use Yes    Comment: socially     Social History     Substance and Sexual Activity   Drug Use No     Social History     Tobacco Use   Smoking Status Heavy Tobacco Smoker    Packs/day: 0 50    Types: Cigarettes   Smokeless Tobacco Never Used     History reviewed  No pertinent family history  Prior to Admission medications    Medication Sig Start Date End Date Taking?  Authorizing Provider   bacitracin ointment Apply topically 2 (two) times a day  Patient not taking: Reported on 7/30/2021    Historical Provider, MD   losartan-hydrochlorothiazide (HYZAAR) 50-12 5 mg per tablet Take 1 tablet by mouth daily Please advise patient to contact our office to set up an office visit  Patient not taking: Reported on 7/30/2021 3/16/18   Bulmaro Montoya MD       Current Facility-Administered Medications   Medication Dose Route Frequency    amLODIPine (NORVASC) tablet 5 mg  5 mg Oral Daily    folic acid 1 mg, thiamine (VITAMIN B1) 100 mg in sodium chloride 0 9 % 100 mL IV piggyback   Intravenous Daily    insulin lispro (HumaLOG) 100 units/mL subcutaneous injection 1-5 Units  1-5 Units Subcutaneous HS    insulin lispro (HumaLOG) 100 units/mL subcutaneous injection 1-6 Units  1-6 Units Subcutaneous TID AC       No Known Allergies    Objective       Intake/Output Summary (Last 24 hours) at 7/31/2021 1043  Last data filed at 7/31/2021 0900  Gross per 24 hour   Intake 1340 ml   Output 0 ml   Net 1340 ml       Invasive Devices:   Peripheral IV 07/30/21 Left Antecubital (Active)   Site Assessment WDL 07/31/21 0300   Dressing Type Transparent 07/31/21 0300   Line Status Blood return noted; Saline locked 07/31/21 0300   Dressing Status Dry; Intact; Clean 07/31/21 0300   Reason Not Rotated Not due 07/31/21 0300       Vitals   Vitals:    07/31/21 0145 07/31/21 0200 07/31/21 0300 07/31/21 0722   BP: 136/99 136/94 158/100 165/99   TempSrc:   Oral Oral   Pulse: 104 96 99 72   Resp:   16 16   Patient Position - Orthostatic VS:   Lying Lying   Temp:   98 4 °F (36 9 °C) 98 2 °F (36 8 °C)         EKG, Pathology, and/or Other Studies: I have personally reviewed pertinent reports  Lab Results: I have personally reviewed pertinent reports        Labs:  Results from last 7 days   Lab Units 07/31/21  0456   WBC Thousand/uL 6 67   HEMOGLOBIN g/dL 14 0   HEMATOCRIT % 39 7   PLATELETS Thousands/uL 166   NEUTROS PCT % 71   LYMPHS PCT % 21   MONOS PCT % 6      Results from last 7 days   Lab Units 07/31/21  0456   POTASSIUM mmol/L 3 1*   CHLORIDE mmol/L 100   CO2 mmol/L 26   BUN mg/dL 7   CREATININE mg/dL 1 04   CALCIUM mg/dL 8 9   ALK PHOS U/L 195*   ALT U/L 68   AST U/L 91*              0   Lab Value Date/Time    TROPONINI <0 02 07/30/2021 2300 Results from last 7 days   Lab Units 07/30/21  2300   ETHANOL LVL mg/dL <3     Invalid input(s): EXTPREGUR    Imaging Studies: I have personally reviewed pertinent reports  Counseling / Coordination of Care  Total floor / unit time spent today 0 minutes   This was a phone consultation

## 2021-07-31 NOTE — ED PROVIDER NOTES
History  Chief Complaint   Patient presents with    Seizure - New Onset     Patient presents via EMS from home with report of seizure like activity witnessed by wife of unknown duration  Per wife patient was clenched with eyes rolled back in head shaking  Appears patient bit lower lip  Paola loss of Bowl or bladder function  Nini Garcia all complaint at this time  HPI     Patient is a 51 yom who presents with a seizure  Seizure was 3 hour ago  Watching tv then he clenched up, shaking, lasted for several minutes  No head strike  No trauma  Last drink was yesterday  Foaming at the mouth  Typically Vodka  Drinks for 10 years  Maybe half a bottle per day  Medical decision makin-year-old male, given seizure activity today, will admit to the hospital   Suspect alcohol withdrawal seizure  Currently patient is well-appearing, however tachycardic with tremors, will treat  Prior to Admission Medications   Prescriptions Last Dose Informant Patient Reported? Taking?   bacitracin ointment Not Taking at Unknown time  Yes No   Sig: Apply topically 2 (two) times a day   Patient not taking: Reported on 2021   losartan-hydrochlorothiazide (HYZAAR) 50-12 5 mg per tablet Not Taking at Unknown time  No No   Sig: Take 1 tablet by mouth daily Please advise patient to contact our office to set up an office visit   Patient not taking: Reported on 2021      Facility-Administered Medications: None       Past Medical History:   Diagnosis Date    Hypertension        History reviewed  No pertinent surgical history  History reviewed  No pertinent family history  I have reviewed and agree with the history as documented      E-Cigarette/Vaping     E-Cigarette/Vaping Substances     Social History     Tobacco Use    Smoking status: Heavy Tobacco Smoker     Packs/day: 0 50     Types: Cigarettes    Smokeless tobacco: Never Used   Substance Use Topics    Alcohol use: Yes     Comment: socially    Drug use: No       Review of Systems   Neurological: Positive for seizures  Psychiatric/Behavioral: The patient is nervous/anxious  All other systems reviewed and are negative  Physical Exam  Physical Exam  Vitals and nursing note reviewed  Constitutional:       Appearance: He is well-developed  He is not diaphoretic  HENT:      Head: Normocephalic and atraumatic  Right Ear: External ear normal       Left Ear: External ear normal       Nose: No congestion  Eyes:      General:         Right eye: No discharge  Left eye: No discharge  Extraocular Movements: Extraocular movements intact  Cardiovascular:      Rate and Rhythm: Regular rhythm  Tachycardia present  Heart sounds: Normal heart sounds  No murmur heard  Pulmonary:      Effort: Pulmonary effort is normal  No respiratory distress  Breath sounds: Normal breath sounds  No wheezing  Abdominal:      General: There is no distension  Palpations: Abdomen is soft  Tenderness: There is no abdominal tenderness  Musculoskeletal:         General: No tenderness or signs of injury  Cervical back: Normal range of motion and neck supple  Skin:     General: Skin is warm and dry  Findings: No erythema  Neurological:      General: No focal deficit present  Mental Status: He is alert and oriented to person, place, and time  Motor: No weakness  Psychiatric:         Mood and Affect: Mood normal          Behavior: Behavior normal       Comments: Some tremors with outstretched hands           Vital Signs  ED Triage Vitals   Temperature Pulse Respirations Blood Pressure SpO2   07/30/21 2118 07/30/21 2118 07/30/21 2118 07/30/21 2118 07/30/21 2118   98 6 °F (37 °C) (!) 134 20 (!) 188/115 94 %      Temp Source Heart Rate Source Patient Position - Orthostatic VS BP Location FiO2 (%)   07/30/21 2118 07/30/21 2118 07/30/21 2118 07/30/21 2118 --   Oral Monitor Sitting Right arm       Pain Score 07/31/21 0259       No Pain           Vitals:    08/01/21 1500 08/01/21 2127 08/02/21 0630 08/02/21 1052   BP: 124/73 143/91 138/71 151/92   Pulse: 66 81 78 85   Patient Position - Orthostatic VS: Lying Sitting Lying Lying         Visual Acuity  Visual Acuity      Most Recent Value   L Pupil Size (mm)  3   R Pupil Size (mm)  3          ED Medications  Medications   folic acid 1 mg, thiamine (VITAMIN B1) 100 mg in sodium chloride 0 9 % 100 mL IV piggyback ( Intravenous New Bag 8/1/21 0815)   amLODIPine (NORVASC) tablet 5 mg (5 mg Oral Given 1/5/28 8155)   folic acid 1 mg in sodium chloride 0 9 % 50 mL IVPB (0 mg Intravenous Stopped 7/31/21 0112)   thiamine (VITAMIN B1) 500 mg in sodium chloride 0 9 % 50 mL IVPB (0 mg Intravenous Stopped 7/31/21 0004)   sodium chloride 0 9 % bolus 1,000 mL (0 mL Intravenous Stopped 7/31/21 0146)   PHENobarbital 65 mg/mL injection 65 mg (65 mg Intravenous Given 7/30/21 2333)   diazepam (VALIUM) tablet 10 mg (10 mg Oral Given 7/30/21 2333)   potassium chloride (K-DUR,KLOR-CON) CR tablet 40 mEq (40 mEq Oral Given 7/31/21 0441)   potassium chloride (K-DUR,KLOR-CON) CR tablet 40 mEq (40 mEq Oral Given 7/31/21 0949)   Gadobutrol injection (SINGLE-DOSE) SOLN 8 mL (8 mL Intravenous Given 8/2/21 1030)       Diagnostic Studies  Results Reviewed     Procedure Component Value Units Date/Time    Hepatitis panel, acute [[id number]]  (Normal) Collected: 07/31/21 0244    Lab Status: Final result Specimen: Blood from Arm, Left Updated: 07/31/21 1153     Hepatitis B Surface Ag Non-reactive     Hep A IgM Non-reactive     Hepatitis C Ab Non-reactive     Hep B C IgM Non-reactive    Hemoglobin A1c w/EAG Estimation (Orders if not completed within the last 90 days) [[id number]]  (Abnormal) Collected: 07/30/21 2132    Lab Status: Final result Specimen: Blood from Arm, Left Updated: 07/31/21 0647     Hemoglobin A1C 5 9 %       mg/dl     Lipid Panel with Direct LDL reflex [[id number]]  (Abnormal) Collected: 07/30/21 2300    Lab Status: Final result Specimen: Blood from Arm, Left Updated: 07/31/21 0421     Cholesterol 327 mg/dL      Triglycerides 269 mg/dL      HDL, Direct 81 mg/dL      LDL Calculated 192 mg/dL     Ethanol [[id number]]  (Normal) Collected: 07/30/21 2300    Lab Status: Final result Specimen: Blood from Arm, Left Updated: 07/30/21 2328     Ethanol Lvl <3 mg/dL     Troponin I [[id number]]  (Normal) Collected: 07/30/21 2300    Lab Status: Final result Specimen: Blood from Arm, Left Updated: 07/30/21 2318     Troponin I <0 02 ng/mL     Trauma tubes on hold [[id number]] Collected: 07/30/21 2132    Lab Status: Final result Specimen: Blood from Arm, Left Updated: 07/30/21 2301    Narrative: The following orders were created for panel order Trauma tubes on hold  Procedure                               Abnormality         Status                     ---------                               -----------         ------                     Rafaela Montanajar Top on hold[[id number]]                           Final result               Gold top on hold[[id number]]                                 Final result               Green / Black tube on hold[[id number]]                       Final result                 Please view results for these tests on the individual orders      Comprehensive metabolic panel [[id number]]  (Abnormal) Collected: 07/30/21 2132    Lab Status: Final result Specimen: Blood from Arm, Left Updated: 07/30/21 2201     Sodium 133 mmol/L      Potassium 3 3 mmol/L      Chloride 94 mmol/L      CO2 24 mmol/L      ANION GAP 15 mmol/L      BUN 8 mg/dL      Creatinine 1 06 mg/dL      Glucose 273 mg/dL      Calcium 9 5 mg/dL       U/L      ALT 81 U/L      Alkaline Phosphatase 246 U/L      Total Protein 8 0 g/dL      Albumin 4 2 g/dL      Total Bilirubin 3 38 mg/dL      eGFR 83 ml/min/1 73sq m     Narrative:      [place] guidelines for Chronic Kidney Disease (CKD):     Stage 1 with normal or high GFR (GFR > 90 mL/min/1 73 square meters)    Stage 2 Mild CKD (GFR = 60-89 mL/min/1 73 square meters)    Stage 3A Moderate CKD (GFR = 45-59 mL/min/1 73 square meters)    Stage 3B Moderate CKD (GFR = 30-44 mL/min/1 73 square meters)    Stage 4 Severe CKD (GFR = 15-29 mL/min/1 73 square meters)    Stage 5 End Stage CKD (GFR <15 mL/min/1 73 square meters)  Note: GFR calculation is accurate only with a steady state creatinine    Lipase [[id number]]  (Normal) Collected: 07/30/21 2132    Lab Status: Final result Specimen: Blood from Arm, Left Updated: 07/30/21 2201     Lipase 196 u/L     CBC and differential [[id number]]  (Abnormal) Collected: 07/30/21 2132    Lab Status: Final result Specimen: Blood from Arm, Left Updated: 07/30/21 2141     WBC 9 29 Thousand/uL      RBC 4 52 Million/uL      Hemoglobin 16 5 g/dL      Hematocrit 44 3 %      MCV 98 fL      MCH 36 5 pg      MCHC 37 2 g/dL      RDW 12 7 %      MPV 9 2 fL      Platelets 294 Thousands/uL      nRBC 0 /100 WBCs      Neutrophils Relative 76 %      Immat GRANS % 1 %      Lymphocytes Relative 17 %      Monocytes Relative 4 %      Eosinophils Relative 1 %      Basophils Relative 1 %      Neutrophils Absolute 7 16 Thousands/µL      Immature Grans Absolute 0 05 Thousand/uL      Lymphocytes Absolute 1 55 Thousands/µL      Monocytes Absolute 0 41 Thousand/µL      Eosinophils Absolute 0 07 Thousand/µL      Basophils Absolute 0 05 Thousands/µL     Fingerstick Glucose (POCT) [[id number]]  (Abnormal) Collected: 07/30/21 2133    Lab Status: Final result Updated: 07/30/21 2134     POC Glucose 269 mg/dl                  CT head without contrast   Final Result by Edward Ibrahim MD (07/31 7308)      No acute intracranial abnormality                    Workstation performed: [id number]         MRI brain w wo contrast    (Results Pending)              Procedures  Procedures         ED Course                             SBIRT 22yo+      Most Recent Value SBIRT (25 yo +)   In order to provide better care to our patients, we are screening all of our patients for alcohol and drug use  Would it be okay to ask you these screening questions? Yes Filed at: 07/30/2021 2122   Initial Alcohol Screen: US AUDIT-C    1  How often do you have a drink containing alcohol?  0 Filed at: 07/30/2021 2122   2  How many drinks containing alcohol do you have on a typical day you are drinking? 0 Filed at: 07/30/2021 2122   3a  Male UNDER 65: How often do you have five or more drinks on one occasion? 0 Filed at: 07/30/2021 2122   Audit-C Score  0 Filed at: 07/30/2021 2122   TYLER: How many times in the past year have you    Used an illegal drug or used a prescription medication for non-medical reasons? Never Filed at: 07/30/2021 2122                    MDM    Disposition  Final diagnoses:   Seizure ([facility] [address] )   Alcohol withdrawal ([facility] )     Time reflects when diagnosis was documented in both MDM as applicable and the Disposition within this note     Time User Action Codes Description Comment    7/31/2021  2:08 AM Moira Spann Add [R56 9] Seizure ([facility] [address] )     7/31/2021  2:08 AM Adalberto Atkins [address] [F10 239] Alcohol withdrawal ([facility] [address] )     7/31/2021  2:32 AM Benjamin Corona [F10 239,  R56 9] Alcohol withdrawal seizure [facility])       ED Disposition     ED Disposition Condition Date/Time Comment    Admit Stable Sat Jul 31, 2021  2:08 AM Case was discussed with Cara Horne and the patient's admission status was agreed to be Admission Status: inpatient status to the service of Dr Rony Nixon  Follow-up Information     Follow up With Specialties Details Why Contact Info    PATHS  Follow up Follow up with PATHS as they will assist you with the application for medical assistance   [address] [facility] [id number]  [facility], [address]  P: [phone]    Ghanshyam Chua MD Family Medicine Follow up in 1 week(s)  [address] [facility] [address]            Current Discharge Medication List      CONTINUE these medications which have NOT CHANGED    Details   bacitracin ointment Apply topically 2 (two) times a day      losartan-hydrochlorothiazide (HYZAAR) 50-12 5 mg per tablet Take 1 tablet by mouth daily Please advise patient to contact our office to set up an office visit  Qty: 15 tablet, Refills: 0    Associated Diagnoses: Essential hypertension           No discharge procedures on file      PDMP Review     None          ED Provider  Electronically Signed by           Roberto Handley MD  08/02/21 5668

## 2021-07-31 NOTE — ASSESSMENT & PLAN NOTE
· Present on admission, , ALT 81, Alkaline phosphatase 246, total bilirubin 3 38  · Suspect in the setting of ETOH abuse  · Trend CMP

## 2021-07-31 NOTE — ASSESSMENT & PLAN NOTE
Blood pressure initially elevated on arrival with readings of 160s-180s/100s; patient did not receive any antihypertensive medication in the ED but he did receive Phenobarbital in ED, BP improved post administration  Last recorded pressure: 158/100  Patient reports that he does not take any antihypertensives at this time as an outpatient  He reports that several years ago he took Hyzaar for several weeks but stopped due to having headaches after starting the medication  Initiate 5 mg of amlodipine  Monitor blood pressure

## 2021-07-31 NOTE — ASSESSMENT & PLAN NOTE
· Presentation: Patient presented to ED via EMS due to new seizure like activity  Per wife, patient was reportedly at home watching TV and clenched up and began shaking for an unknown duration  Patient was foaming at the mouth  Patient did not strike his head  Patient denies bowel or urine incontinence  · Patient has been drinking for 10 years  Patient reported to be that he drinks vodka once or twice per week; however, per ED provider he reported he drinks 1/2 bottle of vodka per day  Patient reported that when he drinks he sometimes consumes an entire bottle of vodka  He reported to be that his last alcoholic drink was on Monday, 07/26/2021  Per ED provider, patient reported his last drink was on 07/29/2021  Patient denies withdrawal symptoms in the past    · Patient is interested in ETOH cessation  He states he can do it on his own and is not interested in rehab  · CT head: "No acute intracranial abnormality "  · Patient received 10 mg of IV Valium and 65 mg of Phenobarbital in ED  · Continue with CIWA protocol  · Neuro checks q4h  · Seizure precautions  · Aspiration precautions  · Monitor on telemetry  · Continue thiamine, folic acid and MV  · CM consult  · Tox consult

## 2021-08-01 LAB
GLUCOSE SERPL-MCNC: 148 MG/DL (ref 65–140)
GLUCOSE SERPL-MCNC: 164 MG/DL (ref 65–140)
GLUCOSE SERPL-MCNC: 174 MG/DL (ref 65–140)
GLUCOSE SERPL-MCNC: 189 MG/DL (ref 65–140)

## 2021-08-01 PROCEDURE — 99232 SBSQ HOSP IP/OBS MODERATE 35: CPT | Performed by: HOSPITALIST

## 2021-08-01 PROCEDURE — 99255 IP/OBS CONSLTJ NEW/EST HI 80: CPT | Performed by: PSYCHIATRY & NEUROLOGY

## 2021-08-01 PROCEDURE — 82948 REAGENT STRIP/BLOOD GLUCOSE: CPT

## 2021-08-01 RX ADMIN — INSULIN LISPRO 1 UNITS: 100 INJECTION, SOLUTION INTRAVENOUS; SUBCUTANEOUS at 12:24

## 2021-08-01 RX ADMIN — THIAMINE HYDROCHLORIDE: 100 INJECTION, SOLUTION INTRAMUSCULAR; INTRAVENOUS at 08:15

## 2021-08-01 RX ADMIN — INSULIN LISPRO 1 UNITS: 100 INJECTION, SOLUTION INTRAVENOUS; SUBCUTANEOUS at 08:15

## 2021-08-01 RX ADMIN — AMLODIPINE BESYLATE 5 MG: 5 TABLET ORAL at 08:15

## 2021-08-01 NOTE — PROGRESS NOTES
[facility]  Progress Note - Ginette Dennis 1975, 51 y o  male MRN: [id number]  Unit/Bed#: S -01 Encounter: [id number]  Primary Care Provider: Isaac Thapa MD   Date and time admitted to hospital: 7/30/2021  9:16 PM    * Alcohol withdrawal seizure [facility])  Assessment & Plan  · Presentation: Patient presented to ED via EMS due to new seizure like activity  Per wife, patient was reportedly at home watching TV and clenched up and began shaking for an unknown duration  Patient was foaming at the mouth  Patient did not strike his head  Patient denies bowel or urine incontinence  · Patient has been drinking for 10 years  He reported he drinks 1/2 bottle of vodka per day  Patient reported that when he drinks he sometimes consumes an entire bottle of vodka  He reported to be that his last alcoholic drink was on Monday, 07/26/2021  · Patient is interested in ETOH cessation  He states he can do it on his own and is not interested in rehab  · CT head: "No acute intracranial abnormality "  · Patient received 10 mg of IV Valium and 65 mg of Phenobarbital in ED  · Continue with CIWA protocol  · Seizure precautions  · Aspiration precautions  · Continue thiamine, folic acid and MV  · CM consult  · Tox consult; recommended input from neuro given somewhat atypical presentation (absence of other signs of withdrawal)  · Neuro consult  · MRI brain  · EEG     Essential hypertension  Assessment & Plan  Blood pressure initially elevated on arrival with readings of 160s-180s/100s; patient did not receive any antihypertensive medication in the ED but he did receive Phenobarbital in ED, BP improved post administration  Last recorded pressure: 158/100  Patient reports that he does not take any antihypertensives at this time as an outpatient  He reports that several years ago he took Hyzaar for several weeks but stopped due to having headaches after starting the medication    Initiate 5 mg of amlodipine  Monitor blood pressure  Elevated LFTs  Assessment & Plan  · Present on admission, , ALT 81, Alkaline phosphatase 246, total bilirubin 3 38  · Suspect in the setting of ETOH abuse  · Trend CMP  Elevated serum glucose  Assessment & Plan  · Patient denies history DM  · Serum glucose of 273 on CMP  · a1c of 5 9       Hypokalemia  Assessment & Plan   Potassium level upon admission: 3 3   Replete and monitor BMP  VTE Pharmacologic Prophylaxis:   Pharmacologic: low risk   Mechanical VTE Prophylaxis in Place: No    Patient Centered Rounds: I have performed bedside rounds with nursing staff today  Discussions with Specialists or Other Care Team Provider:     Education and Discussions with Family / Patient: patient and wife at bedside     Time Spent for Care: 30 minutes  More than 50% of total time spent on counseling and coordination of care as described above  Current Length of Stay: 1 day(s)    Current Patient Status: Inpatient   Certification Statement: The patient will continue to require additional inpatient hospital stay due to alcohol withdrawal seizure requiring further work-up     Discharge Plan / Estimated Discharge Date: TBD based on clinical course    Code Status: Level 1 - Full Code      Subjective:   No new complaints  No nausea, anxiety, tremor  Awaiting further testing per neuro  Objective:     Vitals:   Temp (24hrs), Av 4 °F (36 9 °C), Min:97 9 °F (36 6 °C), Max:98 7 °F (37 1 °C)    Temp:  [97 9 °F (36 6 °C)-98 7 °F (37 1 °C)] 98 2 °F (36 8 °C)  HR:  [62-85] 65  Resp:  [16-18] 18  BP: (126-171)/(73-93) 159/73  SpO2:  [98 %] 98 %  Body mass index is 28 06 kg/m²  Input and Output Summary (last 24 hours): Intake/Output Summary (Last 24 hours) at 2021 1444  Last data filed at 2021 1300  Gross per 24 hour   Intake 1320 ml   Output 0 ml   Net 1320 ml       Physical Exam:     Physical Exam  Vitals and nursing note reviewed     Constitutional: General: He is not in acute distress  Appearance: Normal appearance  Cardiovascular:      Rate and Rhythm: Normal rate  Pulmonary:      Effort: Pulmonary effort is normal  No respiratory distress  Breath sounds: No wheezing  Skin:     General: Skin is warm and dry  Neurological:      General: No focal deficit present  Mental Status: He is alert and oriented to person, place, and time  Mental status is at baseline  Additional Data:     Labs:    Results from last 7 days   Lab Units 07/31/21  0456   WBC Thousand/uL 6 67   HEMOGLOBIN g/dL 14 0   HEMATOCRIT % 39 7   PLATELETS Thousands/uL 166   NEUTROS PCT % 71   LYMPHS PCT % 21   MONOS PCT % 6   EOS PCT % 1     Results from last 7 days   Lab Units 07/31/21  0456   POTASSIUM mmol/L 3 1*   CHLORIDE mmol/L 100   CO2 mmol/L 26   BUN mg/dL 7   CREATININE mg/dL 1 04   CALCIUM mg/dL 8 9   ALK PHOS U/L 195*   ALT U/L 68   AST U/L 91*           * I Have Reviewed All Lab Data Listed Above  * Additional Pertinent Lab Tests Reviewed: All Labs Within Last 24 Hours Reviewed    Imaging:    Imaging Reports Reviewed Today Include:   Imaging Personally Reviewed by Myself Includes:      Recent Cultures (last 7 days):           Last 24 Hours Medication List:   Current Facility-Administered Medications   Medication Dose Route Frequency Provider Last Rate    amLODIPine  5 mg Oral Daily INESSA Larsen      folic acid 1 mg, thiamine 100 mg in 0 9% sodium chloride 100 mL IVPB   Intravenous Daily INESSA Larsen          Today, Patient Was Seen By: David Man MD    ** Please Note: This note has been constructed using a voice recognition system   **

## 2021-08-01 NOTE — ASSESSMENT & PLAN NOTE
· Presentation: Patient presented to ED via EMS due to new seizure like activity  Per wife, patient was reportedly at home watching TV and clenched up and began shaking for an unknown duration  Patient was foaming at the mouth  Patient did not strike his head  Patient denies bowel or urine incontinence  · Patient has been drinking for 10 years  He reported he drinks 1/2 bottle of vodka per day  Patient reported that when he drinks he sometimes consumes an entire bottle of vodka  He reported to be that his last alcoholic drink was on Monday, 07/26/2021  · Patient is interested in ETOH cessation  He states he can do it on his own and is not interested in rehab  · CT head: "No acute intracranial abnormality "  · Patient received 10 mg of IV Valium and 65 mg of Phenobarbital in ED  · Continue with CIWA protocol  · Seizure precautions  · Aspiration precautions  · Continue thiamine, folic acid and MV  · CM consult    · Tox consult; recommended input from neuro given somewhat atypical presentation (absence of other signs of withdrawal)  · Neuro consult  · MRI brain  · EEG

## 2021-08-01 NOTE — ASSESSMENT & PLAN NOTE
Assessment:   Ricki Gonzalez is a 51 y o   male  With a pertinent medical history of alcohol abuse and hypertension who initially presented to [address] [place] [address] on 07/30/2021 for seizure-like activity  Patient was at home home and was noted by his wife to have a genalized seizure like activity lasting for 1-2 minutes with LOC and foaming at his mouth  On arrival to emergency department patient's initial blood pressure was 188/115  Initial ethanol level was within normal limits, troponin 1, and CBC were within normal limits  Patient received 10 mg of diazepam and 65 mg of phenobarb in the ED  Initial head CT did not show any acute abnormalities   Patient was seen by Medical toxicology in consult  It was thought that this was possibly alcohol withdrawal seizures however at that particular time patient did not meet the picture   Current AEDs: none, this is first lifetime episode of seizure like event    Current neurologist: none  Workup:  Lab Results   Component Value Date    AST 91 (H) 07/31/2021    ALT 68 07/31/2021    TBILI 2 36 (H) 07/31/2021      Routine EEG: ordered and pending    Imaging: MRI brain ordered and pending     Plan:   Continue Seizure precautions   At this time hold off AED, patient will likely not require AEDS given this appears to be 1st time provoked seizure activity    [employer] form, patient counseled on the importance of refraining from driving for the next 6 months  Patient verbalized understanding  [employer] form was filled and faxed on 8/2/2021     Continue frequent neuro checks, please notify with any change   MRI brain: negative for acute pathology    Pending routine EEG: if this is negative, patient okay for d/c from neuro stand point

## 2021-08-01 NOTE — CONSULTS
NEUROLOGY RESIDENCY CONSULT NOTE     Name: Breann Luna   Age & Sex: 51 y o  male   MRN: [id number]  Unit/Bed#: S -01   Encounter: [id number]  Length of Stay: 1    ASSESSMENT & PLAN     * Alcohol withdrawal seizure [facility])  Assessment & Plan  Assessment:   Breann Luna is a 51 y o   male  With a pertinent medical history of alcohol abuse and hypertension who initially presented to [address] [place] [address] on 07/30/2021 for seizure-like activity  Patient was at home home and was noted by his wife to have a genalized seizure like activity lasting for 1-2 minutes with LOC and foaming at his mouth  On arrival to emergency department patient's initial blood pressure was 188/115  Initial ethanol level was within normal limits, troponin 1, and CBC were within normal limits  Patient received 10 mg of diazepam and 65 mg of phenobarb in the ED  Initial head CT did not show any acute abnormalities   Patient was seen by Medical toxicology in consult  It was thought that this was possibly alcohol withdrawal seizures however at that particular time patient did not meet the picture   Current AEDs: none, this is first lifetime episode of seizure like event    Current neurologist: none  Workup:  Lab Results   Component Value Date    AST 91 (H) 07/31/2021    ALT 68 07/31/2021    TBILI 2 36 (H) 07/31/2021      Routine EEG: ordered and pending    Imaging: MRI brain ordered and pending     Plan:   Continue Seizure precautions   At this time hold off AED, patient will likely not require AEDS given this appears to be 1st time provoked seizure activity    [employer] form, patient counseled on the importance of refraining from driving for the next 6 months  Patient verbalized understanding  [employer] form will be faxed out     Continue frequent neuro checks, please notify with any change   Pending MRI brain   Pending routine EEG          Recommendations for outpatient neurological follow up have yet to be determined  SUBJECTIVE     Reason for Consult / Principal Problem:  Seizure-like activity  Hx and PE limited by: none     HPI: Ema Shine is a 51 y o   male  With a pertinent medical history of alcohol abuse and hypertension who initially presented to [address] [place] [address] on 07/30/2021 for seizure-like activity  History obtained per chart review and patient  Per report patient was at watching TV and his wife noted that he clenched up and began shaking  At that time patient was foaming at the mouth  There was no reported loss urinary or bowel incontinence and he did not hit his head  EMS was called and patient was brought to the emergency department  On arrival to emergency department patient's initial blood pressure was 188/115  Initial ethanol level was within normal limits, troponin 1, and CBC were within normal limits  Patient received 10 mg of diazepam and 65 mg of phenobarb in the emergency department he did not require further benzodiazepines  Initial head CT did not show any acute abnormalities  Unclear when patient's last drink was as he stated it was on 07/26/2021 however Per report his last drink was on 07/29/2021  Patient has stated he was drinking for about 10 years  He drinks about half a bottle of vodka per day however he can sometimes consume the whole entire bottle  Patient was seen by Medical toxicology in consult  It was thought that this was possibly alcohol withdrawal seizures however at that particular time patient did not meet the picture  It was thought that in rare cases seizures can actually be the 1st manifestations of alcohol withdrawal however a neurology consultation was recommended to rule out other etiologies  Patient was admitted to general medicine for further management  During my evaluation, Patient was sitting in his a chair comfortably  He stated his last drink was on Tuesday   He states that he was at his baseline health the night before and he remembers watching TV  He denies any auras prior to the seizure like activity  He states that his wife thought he was shaking for 7-8 minutes however after recalling it again it was more likely to be 1-2 minutes  The next thing he remembers is his wife trying to shake him to wake him up and he states he did not feel confused other than not recalling what happened  He denies any urinary or bowel incontinence  He denies any tongue bite  He remembers being brought in to the ED by the EMS  He denies any prior history of seizures  He had a normal birth and denies any family history of  seizures or any neurological conditions  He states  For the last 3-5 years he has been drinking about 1-2 bottle of 750 mil of vodka per week  He states when he drinks a whole bottle on a day then he will go the next few days without any alcohol  He denies any withdrawal symptoms  He currently smokes 5-10 cigarettes per day  During my evaluation patient states he feels back to his baseline health  Inpatient consult to Neurology     Date/Time 8/1/2021 9:24 AM     Performed by  Griselda Banegas MD     Authorized by Mel Disla MD              Historical Information   Past Medical History:   Diagnosis Date    Hypertension      History reviewed  No pertinent surgical history  Social History   Social History     Substance and Sexual Activity   Alcohol Use Yes    Comment: socially     Social History     Substance and Sexual Activity   Drug Use No     E-Cigarette/Vaping     E-Cigarette/Vaping Substances     Social History     Tobacco Use   Smoking Status Heavy Tobacco Smoker    Packs/day: 0 50    Types: Cigarettes   Smokeless Tobacco Never Used     Family History: History reviewed  No pertinent family history    Meds/Allergies   all current active meds have been reviewed, current meds:   Current Facility-Administered Medications   Medication Dose Route Frequency    amLODIPine (NORVASC) tablet 5 mg  5 mg Oral Daily    folic acid 1 mg, thiamine (VITAMIN B1) 100 mg in sodium chloride 0 9 % 100 mL IV piggyback   Intravenous Daily    insulin lispro (HumaLOG) 100 units/mL subcutaneous injection 1-5 Units  1-5 Units Subcutaneous HS    insulin lispro (HumaLOG) 100 units/mL subcutaneous injection 1-6 Units  1-6 Units Subcutaneous TID AC    and PTA meds:   Prior to Admission Medications   Prescriptions Last Dose Informant Patient Reported? Taking?   bacitracin ointment Not Taking at Unknown time  Yes No   Sig: Apply topically 2 (two) times a day   Patient not taking: Reported on 7/30/2021   losartan-hydrochlorothiazide (HYZAAR) 50-12 5 mg per tablet Not Taking at Unknown time  No No   Sig: Take 1 tablet by mouth daily Please advise patient to contact our office to set up an office visit   Patient not taking: Reported on 7/30/2021      Facility-Administered Medications: None     No Known Allergies    Review of previous medical records was  completed  Review of Systems   Constitutional: Negative for chills, fatigue and fever  Respiratory: Negative for chest tightness, shortness of breath and wheezing  Cardiovascular: Negative for chest pain, palpitations and leg swelling  Gastrointestinal: Negative for abdominal distention, abdominal pain, anal bleeding, blood in stool, constipation, diarrhea, nausea and vomiting  Genitourinary: Negative for difficulty urinating and hematuria  Neurological: Negative  Negative for dizziness, tremors, seizures, syncope, facial asymmetry, speech difficulty, weakness, light-headedness, numbness and headaches  All other systems reviewed and are negative  OBJECTIVE     Patient ID: Amandeep Robledo is a 51 y o  male      Vitals:   Vitals:    07/31/21 1900 07/31/21 2300 08/01/21 0300 08/01/21 0700   BP: 144/93 134/78 126/83 (!) 171/93   BP Location: Right arm Right arm Right arm Right arm   Pulse: 85 63 62 67   Resp: 16 16 16 18   Temp: 98 7 °F (37 1 °C) 98 6 °F (37 °C) 98 4 °F (36 9 °C) 98 2 °F (36 8 °C)   TempSrc: Oral Oral Oral Oral   SpO2: 98% 98% 98% 98%   Weight:          Body mass index is 28 06 kg/m²  Intake/Output Summary (Last 24 hours) at 2021 1306  Last data filed at 2021 2100  Gross per 24 hour   Intake 360 ml   Output 0 ml   Net 360 ml       Temperature:   Temp (24hrs), Av 4 °F (36 9 °C), Min:97 9 °F (36 6 °C), Max:98 7 °F (37 1 °C)    Temperature: 98 2 °F (36 8 °C)    Invasive Devices: Invasive Devices     Peripheral Intravenous Line            Peripheral IV 21 Left Antecubital 1 day                Physical Exam  Vitals and nursing note reviewed  Constitutional:       Appearance: Normal appearance  He is well-developed  HENT:      Head: Normocephalic and atraumatic  Eyes:      Extraocular Movements: Extraocular movements intact and EOM normal       Pupils: Pupils are equal, round, and reactive to light  Pulmonary:      Effort: Pulmonary effort is normal  No respiratory distress  Musculoskeletal:         General: No tenderness or deformity  Normal range of motion  Cervical back: Normal range of motion  Skin:     General: Skin is warm and dry  Neurological:      Mental Status: He is alert and oriented to person, place, and time  Coordination: Finger-Nose-Finger Test and Heel to [place] crista Test normal       Deep Tendon Reflexes: Strength normal    Psychiatric:         Speech: Speech normal           Neurologic Exam     Mental Status   Oriented to person, place, and time  Follows 2 step commands  Attention: normal    Speech: speech is normal   Level of consciousness: alert  Able to name object  Able to repeat  Cranial Nerves   Cranial nerves II through XII intact  CN II   Visual fields full to confrontation  CN III, IV, VI   Pupils are equal, round, and reactive to light  Extraocular motions are normal    Nystagmus: none   Diplopia: none  Conjugate gaze: present    CN V   Facial sensation intact       CN VII   Facial expression full, symmetric  CN XI   CN XI normal      CN XII   CN XII normal    Tongue: not atrophic  Fasciculations: absent  Tongue deviation: none    Motor Exam   Muscle bulk: normal  Overall muscle tone: normal  Right arm pronator drift: absent  Left arm pronator drift: absent    Strength   Strength / throughout  Right deltoid:   Left deltoid:   Right biceps:   Left biceps:   Right triceps:   Left triceps:   Right hamstrin/5  Left hamstrin/5  Right peroneal:   Left peroneal:   Right gastroc:   Left gastroc: Right Extremity   Deltoids:   Biceps :  Triceps:   WF /WE:   Interosseous:   :   Hip flexors:   Knee Ext :  Knee Flex:   Dorsiflexion   Plantarflexion:     Left Extremity   Deltoids:   Biceps :  Triceps:   WF /WE:   Interosseous:   :   Hip flexors:   Knee Ext :  Knee Flex:   Dorsiflexion   Plantarflexion:        Sensory Exam   Light touch normal      Gait, Coordination, and Reflexes     Coordination   Finger to nose coordination: normal  Heel to shin coordination: normal    Tremor   Resting tremor: absent  Intention tremor: absent  Action tremor: absent    Reflexes   Reflexes 2+ except as noted  Right plantar: normal  Left plantar: normal       LABORATORY DATA     Labs: I have personally reviewed pertinent reports     and I have personally reviewed pertinent films in PACS  Results from last 7 days   Lab Units 21  0456 21  2132   WBC Thousand/uL 6 67 9 29   HEMOGLOBIN g/dL 14 0 16 5   HEMATOCRIT % 39 7 44 3   PLATELETS Thousands/uL 166 219   NEUTROS PCT % 71 76*   MONOS PCT % 6 4      Results from last 7 days   Lab Units 21  0456 21  2132   POTASSIUM mmol/L 3 1* 3 3*   CHLORIDE mmol/L 100 94*   CO2 mmol/L 26 24   BUN mg/dL 7 8   CREATININE mg/dL 1 04 1 06   CALCIUM mg/dL 8 9 9 5   ALK PHOS U/L 195* 246*   ALT U/L 68 81*   AST U/L 91* 106*                      Results from last 7 days Lab Units 07/30/21  2300   TROPONIN I ng/mL <0 02       IMAGING & DIAGNOSTIC TESTING     Radiology Results: I have personally reviewed pertinent reports  and I have personally reviewed pertinent films in PACS  CT head without contrast   Final Result by Veda Gloria MD (07/31 0302)      No acute intracranial abnormality  Workstation performed: [id number]         MRI inpatient order    (Results Pending)       Other Diagnostic Testing: I have personally reviewed pertinent reports  and I have personally reviewed pertinent films in PACS    ACTIVE MEDICATIONS     Current Facility-Administered Medications   Medication Dose Route Frequency    amLODIPine (NORVASC) tablet 5 mg  5 mg Oral Daily    folic acid 1 mg, thiamine (VITAMIN B1) 100 mg in sodium chloride 0 9 % 100 mL IV piggyback   Intravenous Daily    insulin lispro (HumaLOG) 100 units/mL subcutaneous injection 1-5 Units  1-5 Units Subcutaneous HS    insulin lispro (HumaLOG) 100 units/mL subcutaneous injection 1-6 Units  1-6 Units Subcutaneous TID AC       Prior to Admission medications    Medication Sig Start Date End Date Taking?  Authorizing Provider   bacitracin ointment Apply topically 2 (two) times a day  Patient not taking: Reported on 7/30/2021    Historical Provider, MD   losartan-hydrochlorothiazide (HYZAAR) 50-12 5 mg per tablet Take 1 tablet by mouth daily Please advise patient to contact our office to set up an office visit  Patient not taking: Reported on 7/30/2021 3/16/18   Ernie Mendez MD         CODE STATUS & ADVANCED DIRECTIVES     Code Status: Level 1 - Full Code  Advance Directive and Living Will:      Power of :    POLST:        VTE Mechanical Prophylaxis: sequential compression device    ==  MD Nicole Hayes's Neurology Residency, PGY-3

## 2021-08-02 ENCOUNTER — APPOINTMENT (INPATIENT)
Dept: NEUROLOGY | Facility: HOSPITAL | Age: 46
DRG: 897 | End: 2021-08-02
Attending: PSYCHIATRY & NEUROLOGY

## 2021-08-02 ENCOUNTER — APPOINTMENT (INPATIENT)
Dept: MRI IMAGING | Facility: HOSPITAL | Age: 46
DRG: 897 | End: 2021-08-02

## 2021-08-02 VITALS
SYSTOLIC BLOOD PRESSURE: 138 MMHG | WEIGHT: 184.53 LBS | OXYGEN SATURATION: 98 % | TEMPERATURE: 98.3 F | HEART RATE: 75 BPM | BODY MASS INDEX: 28.06 KG/M2 | RESPIRATION RATE: 16 BRPM | DIASTOLIC BLOOD PRESSURE: 76 MMHG

## 2021-08-02 LAB
ALBUMIN SERPL BCP-MCNC: 3.4 G/DL (ref 3.5–5)
ALP SERPL-CCNC: 213 U/L (ref 46–116)
ALT SERPL W P-5'-P-CCNC: 168 U/L (ref 12–78)
ANION GAP SERPL CALCULATED.3IONS-SCNC: 10 MMOL/L (ref 4–13)
AST SERPL W P-5'-P-CCNC: 193 U/L (ref 5–45)
ATRIAL RATE: 116 BPM
BILIRUB SERPL-MCNC: 1.06 MG/DL (ref 0.2–1)
BUN SERPL-MCNC: 10 MG/DL (ref 5–25)
CALCIUM ALBUM COR SERPL-MCNC: 10.5 MG/DL (ref 8.3–10.1)
CALCIUM SERPL-MCNC: 10 MG/DL (ref 8.3–10.1)
CHLORIDE SERPL-SCNC: 102 MMOL/L (ref 100–108)
CO2 SERPL-SCNC: 28 MMOL/L (ref 21–32)
CREAT SERPL-MCNC: 0.78 MG/DL (ref 0.6–1.3)
GFR SERPL CREATININE-BSD FRML MDRD: 107 ML/MIN/1.73SQ M
GLUCOSE SERPL-MCNC: 135 MG/DL (ref 65–140)
GLUCOSE SERPL-MCNC: 148 MG/DL (ref 65–140)
GLUCOSE SERPL-MCNC: 173 MG/DL (ref 65–140)
GLUCOSE SERPL-MCNC: 177 MG/DL (ref 65–140)
P AXIS: 53 DEGREES
POTASSIUM SERPL-SCNC: 3.9 MMOL/L (ref 3.5–5.3)
PR INTERVAL: 144 MS
PROT SERPL-MCNC: 7 G/DL (ref 6.4–8.2)
QRS AXIS: 27 DEGREES
QRSD INTERVAL: 84 MS
QT INTERVAL: 354 MS
QTC INTERVAL: 492 MS
SODIUM SERPL-SCNC: 140 MMOL/L (ref 136–145)
T WAVE AXIS: 65 DEGREES
VENTRICULAR RATE: 116 BPM

## 2021-08-02 PROCEDURE — 95819 EEG AWAKE AND ASLEEP: CPT | Performed by: STUDENT IN AN ORGANIZED HEALTH CARE EDUCATION/TRAINING PROGRAM

## 2021-08-02 PROCEDURE — 99232 SBSQ HOSP IP/OBS MODERATE 35: CPT | Performed by: PSYCHIATRY & NEUROLOGY

## 2021-08-02 PROCEDURE — 95816 EEG AWAKE AND DROWSY: CPT

## 2021-08-02 PROCEDURE — 93010 ELECTROCARDIOGRAM REPORT: CPT | Performed by: INTERNAL MEDICINE

## 2021-08-02 PROCEDURE — 80053 COMPREHEN METABOLIC PANEL: CPT | Performed by: HOSPITALIST

## 2021-08-02 PROCEDURE — 99239 HOSP IP/OBS DSCHRG MGMT >30: CPT

## 2021-08-02 PROCEDURE — 82948 REAGENT STRIP/BLOOD GLUCOSE: CPT

## 2021-08-02 PROCEDURE — G1004 CDSM NDSC: HCPCS

## 2021-08-02 PROCEDURE — 70553 MRI BRAIN STEM W/O & W/DYE: CPT

## 2021-08-02 PROCEDURE — A9585 GADOBUTROL INJECTION: HCPCS | Performed by: PSYCHIATRY & NEUROLOGY

## 2021-08-02 RX ORDER — AMLODIPINE BESYLATE 5 MG/1
5 TABLET ORAL DAILY
Qty: 30 TABLET | Refills: 0 | Status: SHIPPED | OUTPATIENT
Start: 2021-08-03 | End: 2021-09-21

## 2021-08-02 RX ADMIN — GADOBUTROL 8 ML: 604.72 INJECTION INTRAVENOUS at 10:30

## 2021-08-02 RX ADMIN — THIAMINE HYDROCHLORIDE: 100 INJECTION, SOLUTION INTRAMUSCULAR; INTRAVENOUS at 11:16

## 2021-08-02 RX ADMIN — AMLODIPINE BESYLATE 5 MG: 5 TABLET ORAL at 08:13

## 2021-08-02 NOTE — DISCHARGE SUMMARY
[facility]  Discharge- Pj Primus 1975, 51 y o  male MRN: [id number]  Unit/Bed#: S -01 Encounter: [id number]  Primary Care Provider: Ghanshyam Chua MD   Date and time admitted to hospital: 7/30/2021  9:16 PM    * Alcohol withdrawal seizure [facility])  Assessment & Plan  · Presentation: Patient presented to ED via EMS due to new seizure like activity  Per wife, patient was reportedly at home watching TV and clenched up and began shaking for an unknown duration  Patient was foaming at the mouth  Patient did not strike his head  Patient denies bowel or urine incontinence  · Patient has been drinking for 10 years  He reported he drinks 1/2 bottle of vodka per day  Patient reported that when he drinks he sometimes consumes an entire bottle of vodka  He reported to be that his last alcoholic drink was on Monday, 07/26/2021  · CT head: "No acute intracranial abnormality "  · Patient received 10 mg of IV Valium and 65 mg of Phenobarbital in ED  · Continue thiamine, folic acid and MV  · CM consult  CM contacted [facility]  to refer patient  CRS to meet with patient and patient was not interested in the service but did take the contact card  · Tox consult; recommended input from neuro given somewhat atypical presentation (absence of other signs of withdrawal)  · Neuro consult  · MRI brain resulted: Subtle areas of T2 hyperintensity along the frontal horn of the right lateral ventricle which may be reflective of small old lacunar infarcts and gliosis  No acute ischemia, mass or hemorrhage  · EEG resulted: This was a normal awake, drowsy and sleep routine EEG study  No electrographic seizures, interictal epileptiform discharges (seizure tendency) or focal slowing were seen  Elevated LFTs  Assessment & Plan  · Present on admission, , ALT 81, Alkaline phosphatase 246, total bilirubin 3 38  · Suspect in the setting of ETOH abuse    · Trend CMP    Elevated serum glucose  Assessment & Plan  · Patient denies history DM  · Serum glucose of 273 on CMP  · a1c of 5 9   · Monitor finger glucose sticks       Essential hypertension  Assessment & Plan  Blood pressure initially elevated on arrival with readings of 160s-180s/100s; patient did not receive any antihypertensive medication in the ED but he did receive Phenobarbital in ED, BP improved post administration  Last recorded pressure: 158/100  Patient reports that he does not take any antihypertensives at this time as an outpatient  He reports that several years ago he took Hyzaar for several weeks but stopped due to having headaches after starting the medication  Initiated 5 mg of amlodipine, to be continued on discharge       Hypokalemia  Assessment & Plan   Potassium level upon admission: 3 3, today potassium is 3 9      Medical Problems     Resolved Problems  Date Reviewed: 8/1/2021    None              Discharging Resident: Braxton English MD  Discharging Attending: Park Hodgson MD  PCP: Jackie Moulton MD  Admission Date:   Admission Orders (From admission, onward)     Ordered        07/31/21 0209  Inpatient Admission  Once                   Discharge Date: 08/02/21    Consultations During Hospital Stay:  · Toxicology  · Neurology    Procedures Performed:   · None    Significant Findings / Test Results:   · None    Incidental Findings:   · None     Test Results Pending at Discharge (will require follow up):  · MRI brain with and without contrast      Outpatient Tests Requested:  · None    Complications:  None    Reason for Admission: seizure like activity    Hospital Course:   Panda Anderson is a 51 y o  male patient with a PMH of alcohol abuse and HTN who originally presented to the hospital on 7/30/2021 due to witnessed seizure like activity while at home watching TV  While in the ED the patient was  Thought to be having alcohol withdrawal seizure   In the ED he received 10 mg of diazepam and 65 mg of phenobarbital   Patient remains stable after receiving this treatment  Patient had a longer than expected stay secondary to MRI machine issues  Patient is scheduled to receive an MRI today at 9:15 p m  Today the patient is only concerned with being discharged and states she is not interested in rehab and believes he can stop on his own  Today the patient is medically stable for discharge  Please see above list of diagnoses and related plan for additional information  Condition at Discharge: stable    Discharge Day Visit / Exam:   Subjective:  No acute events overnight  Patient has no new complaints this morning  Patient only concern with discharge timeline  Patient denies fever, chills, chest pain, shortness of breath, tremors, diaphoresis  Vitals: Blood Pressure: 138/76 (08/02/21 1500)  Pulse: 75 (08/02/21 1500)  Temperature: 98 3 °F (36 8 °C) (08/02/21 1500)  Temp Source: Oral (08/02/21 1500)  Respirations: 16 (08/02/21 1500)  Weight - Scale: 83 7 kg (184 lb 8 4 oz) (07/30/21 2118)  SpO2: 98 % (08/02/21 1500)  Exam:   Physical Exam  Vitals and nursing note reviewed  Constitutional:       Appearance: He is well-developed  HENT:      Head: Normocephalic and atraumatic  Eyes:      Conjunctiva/sclera: Conjunctivae normal    Cardiovascular:      Rate and Rhythm: Normal rate and regular rhythm  Heart sounds: No murmur heard  Pulmonary:      Effort: Pulmonary effort is normal  No respiratory distress  Breath sounds: Normal breath sounds  Abdominal:      General: There is no distension  Palpations: Abdomen is soft  Tenderness: There is no abdominal tenderness  There is no guarding  Musculoskeletal:      Cervical back: Neck supple  Skin:     General: Skin is warm and dry  Neurological:      Mental Status: He is alert  Discussion with Family: Patient declined call to        Discharge instructions/Information to patient and family:   See after visit summary for information provided to patient and family  Provisions for Follow-Up Care:  See after visit summary for information related to follow-up care and any pertinent home health orders  Disposition:   Home    Planned Readmission: None    Discharge Medications:  See after visit summary for reconciled discharge medications provided to patient and/or family        **Please Note: This note may have been constructed using a voice recognition system**

## 2021-08-02 NOTE — ASSESSMENT & PLAN NOTE
Blood pressure initially elevated on arrival with readings of 160s-180s/100s; patient did not receive any antihypertensive medication in the ED but he did receive Phenobarbital in ED, BP improved post administration  Last recorded pressure: 158/100  Patient reports that he does not take any antihypertensives at this time as an outpatient  He reports that several years ago he took Hyzaar for several weeks but stopped due to having headaches after starting the medication    Initiated 5 mg of amlodipine, to be continued on discharge

## 2021-08-02 NOTE — DISCHARGE INSTR - AVS FIRST PAGE
Dear Evangelist Vuong,     It was our pleasure to care for you here at [facility]  It is our hope that we were always able to exceed the expected standards for your care during your stay  You were hospitalized due to alcohol withdrawal seizures  You were cared for on the 3rd floor by Isabel Ferrell MD under the service of Maricarmen Lopez MD with the [facility] who covers for your primary care physician (PCP), Janessa Herrera MD, while you were hospitalized  If you have any questions or concerns related to this hospitalization, you may contact us at [phone]  For follow up as well as any medication refills, we recommend that you follow up with your primary care physician  A registered nurse will reach out to you by phone within a few days after your discharge to answer any additional questions that you may have after going home  However, at this time we provide for you here, the most important instructions / recommendations at discharge:     · Notable Medication Adjustments -   · Take amlodipine 5 mg tablet daily   · Testing Required after Discharge -   · None  · Important follow up information -   · Please follow up with your primary care provider within one week of discharge   · Please review this entire after visit summary as additional general instructions including medication list, appointments, activity, diet, any pertinent wound care, and other additional recommendations from your care team that may be provided for you        Sincerely,     Isabel Ferrell MD

## 2021-08-02 NOTE — PROGRESS NOTES
NEUROLOGY RESIDENCY PROGRESS NOTE     Name: Mannie Lynn   Age & Sex: 51 y o  male   MRN: [id number]  Unit/Bed#: S -01   Encounter: [id number]    Recommendations for outpatient neurological follow up have yet to be determined  ASSESSMENT & PLAN     * Alcohol withdrawal seizure [facility])  Assessment & Plan  Assessment:   Mannie Lynn is a 51 y o   male  With a pertinent medical history of alcohol abuse and hypertension who initially presented to [address] [place] [address] on 07/30/2021 for seizure-like activity  Patient was at home home and was noted by his wife to have a genalized seizure like activity lasting for 1-2 minutes with LOC and foaming at his mouth  On arrival to emergency department patient's initial blood pressure was 188/115  Initial ethanol level was within normal limits, troponin 1, and CBC were within normal limits  Patient received 10 mg of diazepam and 65 mg of phenobarb in the ED  Initial head CT did not show any acute abnormalities   Patient was seen by Medical toxicology in consult  It was thought that this was possibly alcohol withdrawal seizures however at that particular time patient did not meet the picture   Current AEDs: none, this is first lifetime episode of seizure like event    Current neurologist: none  Workup:  Lab Results   Component Value Date    AST 91 (H) 07/31/2021    ALT 68 07/31/2021    TBILI 2 36 (H) 07/31/2021      Routine EEG: ordered and pending    Imaging: MRI brain ordered and pending     Plan:   Continue Seizure precautions   At this time hold off AED, patient will likely not require AEDS given this appears to be 1st time provoked seizure activity    [employer] form, patient counseled on the importance of refraining from driving for the next 6 months  Patient verbalized understanding  [employer] form was filled and faxed on 8/2/2021     Continue frequent neuro checks, please notify with any change   MRI brain: negative for acute pathology    Pending routine EEG: if this is negative, patient okay for d/c from neuro stand point           SUBJECTIVE     Patient was seen and examined  Patient offers no complaints  No significant overnight events reported  Pertinent negatives include: Chest pain, palpitations, dyspnea, nausea, vomiting, diarrhea, abdominal pain  Review of Systems   Constitutional: Negative for chills and fever  HENT: Negative for ear pain and sore throat  Eyes: Negative for pain and visual disturbance  Respiratory: Negative for cough and shortness of breath  Cardiovascular: Negative for chest pain and palpitations  Gastrointestinal: Negative for abdominal pain and vomiting  Genitourinary: Negative for dysuria and hematuria  Musculoskeletal: Negative for arthralgias and back pain  Skin: Negative for color change and rash  Neurological: Negative for seizures and syncope  All other systems reviewed and are negative  OBJECTIVE     Patient ID: Corine Ovalle is a 51 y o  male  Vitals:    21 1500 21 2127 21 0630 21 1052   BP: 124/73 143/91 138/71 151/92   BP Location: Right arm Right arm Right arm Right arm   Pulse: 66 81 78 85   Resp: 18 18 19 19   Temp: 98 4 °F (36 9 °C) 98 °F (36 7 °C) 98 2 °F (36 8 °C) 98 2 °F (36 8 °C)   TempSrc: Oral Oral Oral Oral   SpO2: 98% 98% 97% 97%   Weight:          Temperature:   Temp (24hrs), Av 1 °F (36 7 °C), Min:98 °F (36 7 °C), Max:98 2 °F (36 8 °C)    Temperature: 98 2 °F (36 8 °C)      Physical Exam  Vitals and nursing note reviewed  Constitutional:       Appearance: He is well-developed  HENT:      Head: Normocephalic and atraumatic  Eyes:      Extraocular Movements: Extraocular movements intact  Conjunctiva/sclera: Conjunctivae normal       Pupils: Pupils are equal, round, and reactive to light  Abdominal:      Tenderness: There is no abdominal tenderness     Musculoskeletal:         General: Normal range of motion  Cervical back: Neck supple  Skin:     General: Skin is warm and dry  Neurological:      Mental Status: He is alert and oriented to person, place, and time  Coordination: Finger-Nose-Finger Test and Heel to [place] crista Test normal       Deep Tendon Reflexes: Strength normal    Psychiatric:         Speech: Speech normal           Neurologic Exam     Mental Status   Oriented to person, place, and time  Follows 2 step commands  Attention: normal    Speech: speech is normal   Level of consciousness: alert  Able to name object  Able to repeat  Cranial Nerves   Cranial nerves II through XII intact  CN II   Visual fields full to confrontation  CN III, IV, VI   Pupils are equal, round, and reactive to light  Nystagmus: none   Diplopia: none  Conjugate gaze: present    CN V   Facial sensation intact  CN XI   CN XI normal      CN XII   CN XII normal    Tongue: not atrophic  Fasciculations: absent  Tongue deviation: none    Motor Exam   Muscle bulk: normal  Overall muscle tone: normal  Right arm pronator drift: absent  Left arm pronator drift: absent    Strength   Strength 5/5 throughout  Right deltoid: 5/5  Left deltoid: 5/5  Right biceps: 5/5  Left biceps: 5/5  Right triceps: 5/5  Left triceps: 5/5  Right hamstrin/5  Left hamstrin/5  Right peroneal: 5/5  Left peroneal: 5/5  Right gastroc: 5/5  Left gastroc: 5/5    Sensory Exam   Light touch normal      Gait, Coordination, and Reflexes     Coordination   Finger to nose coordination: normal  Heel to shin coordination: normal    Tremor   Resting tremor: absent  Intention tremor: absent  Action tremor: absent    Reflexes   Reflexes 2+ except as noted  Right plantar: normal  Left plantar: normal       LABORATORY DATA     Labs: I have personally reviewed pertinent reports     and I have personally reviewed pertinent films in PACS  Results from last 7 days   Lab Units 21  0456 21  2132   WBC Thousand/uL 6 67 9 29 HEMOGLOBIN g/dL 14 0 16 5   HEMATOCRIT % 39 7 44 3   PLATELETS Thousands/uL 166 219   NEUTROS PCT % 71 76*   MONOS PCT % 6 4      Results from last 7 days   Lab Units 08/02/21  0456 07/31/21  0456 07/30/21  2132   SODIUM mmol/L 140 136 133*   POTASSIUM mmol/L 3 9 3 1* 3 3*   CHLORIDE mmol/L 102 100 94*   CO2 mmol/L 28 26 24   BUN mg/dL 10 7 8   CREATININE mg/dL 0 78 1 04 1 06   CALCIUM mg/dL 10 0 8 9 9 5   ALK PHOS U/L 213* 195* 246*   ALT U/L 168* 68 81*   AST U/L 193* 91* 106*                      Results from last 7 days   Lab Units 07/30/21  2300   TROPONIN I ng/mL <0 02       IMAGING & DIAGNOSTIC TESTING     Radiology Results: I have personally reviewed pertinent reports  and I have personally reviewed pertinent films in PACS    MRI brain w wo contrast   Final Result by Irene Barrow DO (08/02 1127)      Subtle areas of T2 hyperintensity along the frontal horn of the right lateral ventricle which may be reflective of small old lacunar infarcts and gliosis  No acute ischemia, mass or hemorrhage  Workstation performed: [id number]         CT head without contrast   Final Result by Joe Doherty MD (07/31 9189)      No acute intracranial abnormality  Workstation performed: [id number]             Other Diagnostic Testing: I have personally reviewed pertinent reports  and I have personally reviewed pertinent films in PACS    ACTIVE MEDICATIONS     Current Facility-Administered Medications   Medication Dose Route Frequency    amLODIPine (NORVASC) tablet 5 mg  5 mg Oral Daily    folic acid 1 mg, thiamine (VITAMIN B1) 100 mg in sodium chloride 0 9 % 100 mL IV piggyback   Intravenous Daily       Prior to Admission medications    Medication Sig Start Date End Date Taking?  Authorizing Provider   bacitracin ointment Apply topically 2 (two) times a day  Patient not taking: Reported on 7/30/2021    Historical Provider, MD   losartan-hydrochlorothiazide (HYZAAR) 50-12 5 mg per tablet Take 1 tablet by mouth daily Please advise patient to contact our office to set up an office visit  Patient not taking: Reported on 7/30/2021 3/16/18   Liliana Donahue MD         VTE Pharmacologic Prophylaxis: Enoxaparin (Lovenox)  VTE Mechanical Prophylaxis: sequential compression device    ==  MD S[facility] Pate's Neurology Residency, PGY-3

## 2021-08-02 NOTE — PLAN OF CARE
Problem: Potential for Falls  Goal: Patient will remain free of falls  Description: INTERVENTIONS:  - Educate patient/family on patient safety including physical limitations  - Instruct patient to call for assistance with activity   - Consult OT/PT to assist with strengthening/mobility   - Keep Call bell within reach  - Keep bed low and locked with side rails adjusted as appropriate  - Keep care items and personal belongings within reach  - Initiate and maintain comfort rounds  - Make Fall Risk Sign visible to staff  - Apply yellow socks and bracelet for high fall risk patients  - Consider moving patient to room near nurses station  Outcome: Adequate for Discharge     Problem: MOBILITY - ADULT  Goal: Maintain or return to baseline ADL function  Description: INTERVENTIONS:  -  Assess patient's ability to carry out ADLs; assess patient's baseline for ADL function and identify physical deficits which impact ability to perform ADLs (bathing, care of mouth/teeth, toileting, grooming, dressing, etc )  - Assess/evaluate cause of self-care deficits   - Assess range of motion  - Assess patient's mobility; develop plan if impaired  - Assess patient's need for assistive devices and provide as appropriate  - Encourage maximum independence but intervene and supervise when necessary  - Involve family in performance of ADLs  - Assess for home care needs following discharge   - Consider OT consult to assist with ADL evaluation and planning for discharge  - Provide patient education as appropriate  Outcome: Adequate for Discharge  Goal: Maintains/Returns to pre admission functional level  Description: INTERVENTIONS:  - Perform BMAT or MOVE assessment daily    - Set and communicate daily mobility goal to care team and patient/family/caregiver     - Collaborate with rehabilitation services on mobility goals if consulted  - Out of bed for toileting  - Record patient progress and toleration of activity level   Outcome: Adequate for Discharge     Problem: PAIN - ADULT  Goal: Verbalizes/displays adequate comfort level or baseline comfort level  Description: Interventions:  - Encourage patient to monitor pain and request assistance  - Assess pain using appropriate pain scale  - Administer analgesics based on type and severity of pain and evaluate response  - Implement non-pharmacological measures as appropriate and evaluate response  - Consider cultural and social influences on pain and pain management  - Notify physician/advanced practitioner if interventions unsuccessful or patient reports new pain  Outcome: Adequate for Discharge     Problem: INFECTION - ADULT  Goal: Absence or prevention of progression during hospitalization  Description: INTERVENTIONS:  - Assess and monitor for signs and symptoms of infection  - Monitor lab/diagnostic results  - Monitor all insertion sites, i e  indwelling lines, tubes, and drains  - Monitor endotracheal if appropriate and nasal secretions for changes in amount and color  - [place] appropriate cooling/warming therapies per order  - Administer medications as ordered  - Instruct and encourage patient and family to use good hand hygiene technique  - Identify and instruct in appropriate isolation precautions for identified infection/condition  Outcome: Adequate for Discharge  Goal: Absence of fever/infection during neutropenic period  Description: INTERVENTIONS:  - Monitor WBC    Outcome: Adequate for Discharge     Problem: SAFETY ADULT  Goal: Patient will remain free of falls  Description: INTERVENTIONS:  - Educate patient/family on patient safety including physical limitations  - Instruct patient to call for assistance with activity   - Consult OT/PT to assist with strengthening/mobility   - Keep Call bell within reach  - Keep bed low and locked with side rails adjusted as appropriate  - Keep care items and personal belongings within reach  - Initiate and maintain comfort rounds  - Make Fall Risk Sign visible to staff  - Apply yellow socks and bracelet for high fall risk patients  - Consider moving patient to room near nurses station  Outcome: Adequate for Discharge  Goal: Maintain or return to baseline ADL function  Description: INTERVENTIONS:  -  Assess patient's ability to carry out ADLs; assess patient's baseline for ADL function and identify physical deficits which impact ability to perform ADLs (bathing, care of mouth/teeth, toileting, grooming, dressing, etc )  - Assess/evaluate cause of self-care deficits   - Assess range of motion  - Assess patient's mobility; develop plan if impaired  - Assess patient's need for assistive devices and provide as appropriate  - Encourage maximum independence but intervene and supervise when necessary  - Involve family in performance of ADLs  - Assess for home care needs following discharge   - Consider OT consult to assist with ADL evaluation and planning for discharge  - Provide patient education as appropriate  Outcome: Adequate for Discharge  Goal: Maintains/Returns to pre admission functional level  Description: INTERVENTIONS:  - Perform BMAT or MOVE assessment daily    - Set and communicate daily mobility goal to care team and patient/family/caregiver     - Collaborate with rehabilitation services on mobility goals if consulted  - Out of bed for toileting  - Record patient progress and toleration of activity level   Outcome: Adequate for Discharge     Problem: DISCHARGE PLANNING  Goal: Discharge to home or other facility with appropriate resources  Description: INTERVENTIONS:  - Identify barriers to discharge w/patient and caregiver  - Arrange for needed discharge resources and transportation as appropriate  - Identify discharge learning needs (meds, wound care, etc )  - Arrange for interpretive services to assist at discharge as needed  - Refer to Case Management Department for coordinating discharge planning if the patient needs post-hospital services based on physician/advanced practitioner order or complex needs related to functional status, cognitive ability, or social support system  Outcome: Adequate for Discharge     Problem: Knowledge Deficit  Goal: Patient/family/caregiver demonstrates understanding of disease process, treatment plan, medications, and discharge instructions  Description: Complete learning assessment and assess knowledge base    Interventions:  - Provide teaching at level of understanding  - Provide teaching via preferred learning methods  Outcome: Adequate for Discharge

## 2021-08-02 NOTE — CASE MANAGEMENT
CM received consult for KELLY/OUD  CM contacted [facility]  to refer patient and provided minimal necessary information  CRS to meet with patient and follow up with CM to provide update and discuss plan of care after they meet with patient

## 2021-08-02 NOTE — CASE MANAGEMENT
CHRIS AMEZCUA met with patient at bedside  She reports he is not interested in treatment at this time but did accept her card and will follow up should he change his mind  As per SLIM, patient should be discharging home today pending EEG  No CM needs identified

## 2021-08-02 NOTE — ASSESSMENT & PLAN NOTE
· Presentation: Patient presented to ED via EMS due to new seizure like activity  Per wife, patient was reportedly at home watching TV and clenched up and began shaking for an unknown duration  Patient was foaming at the mouth  Patient did not strike his head  Patient denies bowel or urine incontinence  · Patient has been drinking for 10 years  He reported he drinks 1/2 bottle of vodka per day  Patient reported that when he drinks he sometimes consumes an entire bottle of vodka  He reported to be that his last alcoholic drink was on Monday, 07/26/2021  · Patient is interested in ETOH cessation  He states he can do it on his own and is not interested in rehab  · CT head: "No acute intracranial abnormality "  · Patient received 10 mg of IV Valium and 65 mg of Phenobarbital in ED  · Continue with CIWA protocol  · Seizure precautions  · Aspiration precautions  · Continue thiamine, folic acid and MV  · CM consult    · Tox consult; recommended input from neuro given somewhat atypical presentation (absence of other signs of withdrawal)  · Neuro consult  · MRI brain pending  · EEG pending

## 2021-08-02 NOTE — ASSESSMENT & PLAN NOTE
Blood pressure initially elevated on arrival with readings of 160s-180s/100s; patient did not receive any antihypertensive medication in the ED but he did receive Phenobarbital in ED, BP improved post administration  Last recorded pressure: 158/100  Patient reports that he does not take any antihypertensives at this time as an outpatient  He reports that several years ago he took Hyzaar for several weeks but stopped due to having headaches after starting the medication  Initiated 5 mg of amlodipine

## 2021-08-02 NOTE — ASSESSMENT & PLAN NOTE
· Presentation: Patient presented to ED via EMS due to new seizure like activity  Per wife, patient was reportedly at home watching TV and clenched up and began shaking for an unknown duration  Patient was foaming at the mouth  Patient did not strike his head  Patient denies bowel or urine incontinence  · Patient has been drinking for 10 years  He reported he drinks 1/2 bottle of vodka per day  Patient reported that when he drinks he sometimes consumes an entire bottle of vodka  He reported to be that his last alcoholic drink was on Monday, 07/26/2021  · CT head: "No acute intracranial abnormality "  · Patient received 10 mg of IV Valium and 65 mg of Phenobarbital in ED  · Continue thiamine, folic acid and MV  · CM consult  CM contacted [facility]  to refer patient  CRS to meet with patient and patient was not interested in the service but did take the contact card  · Tox consult; recommended input from neuro given somewhat atypical presentation (absence of other signs of withdrawal)  · Neuro consult  · MRI brain resulted: Subtle areas of T2 hyperintensity along the frontal horn of the right lateral ventricle which may be reflective of small old lacunar infarcts and gliosis  No acute ischemia, mass or hemorrhage  · EEG resulted: This was a normal awake, drowsy and sleep routine EEG study  No electrographic seizures, interictal epileptiform discharges (seizure tendency) or focal slowing were seen

## 2021-08-02 NOTE — UTILIZATION REVIEW
Initial Clinical Review    Admission: Date/Time/Statement:   Admission Orders (From admission, onward)     Ordered        07/31/21 0209  Inpatient Admission  Once                   Orders Placed This Encounter   Procedures    Inpatient Admission     Standing Status:   Standing     Number of Occurrences:   1     Order Specific Question:   Level of Care     Answer:   Med Surg [16]     Order Specific Question:   Estimated length of stay     Answer:   More than 2 Midnights     Order Specific Question:   Certification     Answer:   I certify that inpatient services are medically necessary for this patient for a duration of greater than two midnights  See H&P and MD Progress Notes for additional information about the patient's course of treatment  ED Arrival Information     Expected Arrival Acuity    - 7/30/2021 21:16 Urgent         Means of arrival Escorted by Service Admission type    Ambulance [facility] EMS Hospitalist Urgent         Arrival complaint    SEIZURE        Chief Complaint   Patient presents with    Seizure - New Onset     Patient presents via EMS from home with report of seizure like activity witnessed by wife of unknown duration  Per wife patient was clenched with eyes rolled back in head shaking  Appears patient bit lower lip  Paola loss of Bowl or bladder function  Janeice Press all complaint at this time  Initial Presentation: 51year old male to the ED from home with complaints of seizure as witnessed by his wife 3 hours prior to arrival  Drinks vodka daily, last drink was the prior  Admitted to inpatient for alcohol withdrawal seizure, elevated LFTs  Wife witnessed generalized shaking, clenched  GIven Valium and PHenobarbital in the ED  CIWA protocol  Arrives tachycardic, anxious, hypertensive  Neurology consult, Toxicology consult  Check MRI, EEG  CT head shows no abnormality  7/31 Medical Tox consult:  Start thiamine, folate  Continue with benzodiazepines as needed per CIWA protocol  GCS 15  Date: 8/1   Day 2:   Neurology consult: No tongue biting on incontinence during seizure like episode  Hold off on starting AED at this time  MRI, EEG pending       ED Triage Vitals   Temperature Pulse Respirations Blood Pressure SpO2   07/30/21 2118 07/30/21 2118 07/30/21 2118 07/30/21 2118 07/30/21 2118   98 6 °F (37 °C) (!) 134 20 (!) 188/115 94 %      Temp Source Heart Rate Source Patient Position - Orthostatic VS BP Location FiO2 (%)   07/30/21 2118 07/30/21 2118 07/30/21 2118 07/30/21 2118 --   Oral Monitor Sitting Right arm       Pain Score       07/31/21 0259       No Pain          Wt Readings from Last 1 Encounters:   07/30/21 83 7 kg (184 lb 8 4 oz)     Additional Vital Signs:   Date/Time  Temp  Pulse  Resp  BP  MAP (mmHg)  SpO2  O2 Device  Patient Position - Orthostatic VS   08/01/21 2000  --  --  --  --  --  --  None (Room air)  --   08/01/21 1500  98 4 °F (36 9 °C)  66  18  124/73  93  98 %  None (Room air)  Lying   08/01/21 1100  --  65  --  159/73  --  --  --  --   08/01/21 0700  98 2 °F (36 8 °C)  67  18  171/93Abnormal   125  98 %  None (Room air)  Lying   08/01/21 0300  98 4 °F (36 9 °C)  62  16  126/83  99  98 %  None (Room air)  Lying   07/31/21 2300  98 6 °F (37 °C)  63  16  134/78  101  98 %  None (Room air)  Lying   07/31/21 2000  --  --  --  --  --  --  None (Room air)  --   07/31/21 1900  98 7 °F (37 1 °C)  85  16  144/93  113  98 %  None (Room air)  Lying   07/31/21 1541  97 9 °F (36 6 °C)  82  16  146/88  111  98 %  None (Room air)  Lying   07/31/21 1100  --  70  --  150/60  --  --  --  --   07/31/21 0722  98 2 °F (36 8 °C)  72  16  165/99  --  96 %  None (Room air)  Lying   07/31/21 0300  98 4 °F (36 9 °C)  99  16  158/100  123  97 %  None (Room air)  Lying   07/31/21 0200  --  96  --  136/94  111  95 %  --  --   07/31/21 0145  --  104  --  136/99  115  97 %  --  --   07/31/21 0100  --  84  --  149/101Abnormal   116  97 %  --  --   07/31/21 0030  --  92  --  160/105Abnormal CIWA 0  Pertinent Labs/Diagnostic Test Results:   7/31 CT head:No acute intracranial abnormality           Results from last 7 days   Lab Units 07/31/21  0456 07/30/21  2132   WBC Thousand/uL 6 67 9 29   HEMOGLOBIN g/dL 14 0 16 5   HEMATOCRIT % 39 7 44 3   PLATELETS Thousands/uL 166 219   NEUTROS ABS Thousands/µL 4 77 7 16         Results from last 7 days   Lab Units 07/31/21  0456 07/30/21  2132   SODIUM mmol/L 136 133*   POTASSIUM mmol/L 3 1* 3 3*   CHLORIDE mmol/L 100 94*   CO2 mmol/L 26 24   ANION GAP mmol/L 10 15*   BUN mg/dL 7 8   CREATININE mg/dL 1 04 1 06   EGFR ml/min/1 73sq m 85 83   CALCIUM mg/dL 8 9 9 5     Results from last 7 days   Lab Units 07/31/21  0456 07/30/21  2132   AST U/L 91* 106*   ALT U/L 68 81*   ALK PHOS U/L 195* 246*   TOTAL PROTEIN g/dL 6 6 8 0   ALBUMIN g/dL 2 7* 4 2   TOTAL BILIRUBIN mg/dL 2 36* 3 38*     Results from last 7 days   Lab Units 08/01/21  2128 08/01/21  1521 08/01/21  1035 08/01/21  0738 07/31/21  2157 07/31/21  1543 07/31/21  1132 07/31/21  0724 07/31/21  0440 07/30/21  2133   POC GLUCOSE mg/dl 174* 148* 189* 164* 167* 157* 190* 139 149* 269*     Results from last 7 days   Lab Units 07/31/21  0456 07/30/21  2132   GLUCOSE RANDOM mg/dL 141* 273*         Results from last 7 days   Lab Units 07/30/21  2132   HEMOGLOBIN A1C % 5 9*   EAG mg/dl 123     Results from last 7 days   Lab Units 07/30/21  2300   TROPONIN I ng/mL <0 02     Results from last 7 days   Lab Units 07/31/21  0244   HEP B S AG  Non-reactive   HEP C AB  Non-reactive   HEP B C IGM  Non-reactive     Results from last 7 days   Lab Units 07/30/21  2132   LIPASE u/L 196     Results from last 7 days   Lab Units 07/30/21  2300   ETHANOL LVL mg/dL <3     ED Treatment:   Medication Administration from 07/30/2021 2116 to 07/31/2021 0258       Date/Time Order Dose Route Action     96/58/3812 5228 folic acid 1 mg in sodium chloride 0 9 % 50 mL IVPB 1 mg Intravenous New Bag     07/30/2021 7751 thiamine (VITAMIN B1) 500 mg in sodium chloride 0 9 % 50 mL IVPB 500 mg Intravenous New Bag     07/30/2021 2334 sodium chloride 0 9 % bolus 1,000 mL 1,000 mL Intravenous New Bag     07/30/2021 2333 PHENobarbital 65 mg/mL injection 65 mg 65 mg Intravenous Given     07/30/2021 2333 diazepam (VALIUM) tablet 10 mg 10 mg Oral Given        Past Medical History:   Diagnosis Date    Hypertension      Admitting Diagnosis: Alcohol withdrawal ([facility] [address] [facility] ) [F10 239]  [facility] ) [R56 9]  Alcohol withdrawal seizure ([facility] ) [S25 614, R56 9]  Age/Sex: 51 y o  male  Admission Orders:    Scheduled Medications:  amLODIPine, 5 mg, Oral, Daily  folic acid 1 mg, thiamine 100 mg in 0 9% sodium chloride 100 mL IVPB, , Intravenous, Daily      Continuous IV Infusions:     PRN Meds:       IP CONSULT TO TOXICOLOGY  IP CONSULT TO CASE MANAGEMENT  IP CONSULT TO NEUROLOGY    Network Utilization Review Department  ATTENTION: Please call with any questions or concerns to [phone] and carefully listen to the prompts so that you are directed to the right person  All voicemails are confidential   Akin Clark all requests for admission clinical reviews, approved or denied determinations and any other requests to dedicated fax number below belonging to the campus where the patient is receiving treatment   List of dedicated fax numbers for the Facilities:  [address] DENIALS (Administrative/Medical Necessity) [phone]   [address] (Maternity/NICU/Pediatrics) [phone] [address] [place] [address] [address] [facility] [id number] [address]  Donaldo Day [address]  [place] [address] [address] [place] [address] [phone]

## 2021-08-02 NOTE — ASSESSMENT & PLAN NOTE
· Patient denies history DM  · Serum glucose of 273 on CMP    · a1c of 5 9   · Monitor finger glucose sticks

## 2021-08-02 NOTE — PROGRESS NOTES
[facility]  Progress Note - Rick Orozco 1975, 51 y o  male MRN: [id number]  Unit/Bed#: S -01 Encounter: [id number]  Primary Care Provider: Mika Brush MD   Date and time admitted to hospital: 7/30/2021  9:16 PM    * Alcohol withdrawal seizure [facility])  Assessment & Plan  · Presentation: Patient presented to ED via EMS due to new seizure like activity  Per wife, patient was reportedly at home watching TV and clenched up and began shaking for an unknown duration  Patient was foaming at the mouth  Patient did not strike his head  Patient denies bowel or urine incontinence  · Patient has been drinking for 10 years  He reported he drinks 1/2 bottle of vodka per day  Patient reported that when he drinks he sometimes consumes an entire bottle of vodka  He reported to be that his last alcoholic drink was on Monday, 07/26/2021  · Patient is interested in ETOH cessation  He states he can do it on his own and is not interested in rehab  · CT head: "No acute intracranial abnormality "  · Patient received 10 mg of IV Valium and 65 mg of Phenobarbital in ED  · Continue with CIWA protocol  · Seizure precautions  · Aspiration precautions  · Continue thiamine, folic acid and MV  · CM consult  · Tox consult; recommended input from neuro given somewhat atypical presentation (absence of other signs of withdrawal)  · Neuro consult  · MRI brain  · EEG     Elevated LFTs  Assessment & Plan  · Present on admission, , ALT 81, Alkaline phosphatase 246, total bilirubin 3 38  · Suspect in the setting of ETOH abuse  · Trend CMP  Elevated serum glucose  Assessment & Plan  · Patient denies history DM  · Serum glucose of 273 on CMP    · a1c of 5 9   · Monitor finger glucose sticks       Essential hypertension  Assessment & Plan  Blood pressure initially elevated on arrival with readings of 160s-180s/100s; patient did not receive any antihypertensive medication in the ED but he did receive Phenobarbital in ED, BP improved post administration  Last recorded pressure: 158/100  Patient reports that he does not take any antihypertensives at this time as an outpatient  He reports that several years ago he took Hyzaar for several weeks but stopped due to having headaches after starting the medication  Initiate 5 mg of amlodipine  Monitor blood pressure  Hypokalemia  Assessment & Plan   Potassium level upon admission: 3 3   Replete and monitor BMP  VTE Pharmacologic Prophylaxis: VTE Score: 2 Low Risk (Score 0-2) - Encourage Ambulation  Patient Centered Rounds: I performed bedside rounds with nursing staff today  Discussions with Specialists or Other Care Team Provider: Nurse and      Education and Discussions with Family / Patient: Patient declined call to   Current Length of Stay: 2 day(s)  Current Patient Status: Inpatient   Discharge Plan: Anticipate discharge in 48-72 hrs to home  Code Status: Level 1 - Full Code    Subjective:   No acute events overnight  Objective:     Vitals:   Temp (24hrs), Av 2 °F (36 8 °C), Min:98 °F (36 7 °C), Max:98 4 °F (36 9 °C)    Temp:  [98 °F (36 7 °C)-98 4 °F (36 9 °C)] 98 °F (36 7 °C)  HR:  [65-81] 81  Resp:  [18] 18  BP: (124-171)/(73-93) 143/91  SpO2:  [98 %] 98 %  Body mass index is 28 06 kg/m²  Input and Output Summary (last 24 hours): Intake/Output Summary (Last 24 hours) at 2021 0617  Last data filed at 2021 1300  Gross per 24 hour   Intake 960 ml   Output --   Net 960 ml       Physical Exam:   Physical Exam  Vitals and nursing note reviewed  Constitutional:       Appearance: He is well-developed  HENT:      Head: Normocephalic and atraumatic  Eyes:      Conjunctiva/sclera: Conjunctivae normal    Cardiovascular:      Rate and Rhythm: Normal rate and regular rhythm  Heart sounds: No murmur heard       Pulmonary:      Effort: Pulmonary effort is normal  No respiratory distress  Breath sounds: Normal breath sounds  Abdominal:      Palpations: Abdomen is soft  Tenderness: There is no abdominal tenderness  Musculoskeletal:      Cervical back: Neck supple  Skin:     General: Skin is warm and dry  Neurological:      Mental Status: He is alert  Additional Data:     Labs:  Results from last 7 days   Lab Units 07/31/21  0456   WBC Thousand/uL 6 67   HEMOGLOBIN g/dL 14 0   HEMATOCRIT % 39 7   PLATELETS Thousands/uL 166   NEUTROS PCT % 71   LYMPHS PCT % 21   MONOS PCT % 6   EOS PCT % 1     Results from last 7 days   Lab Units 07/31/21  0456   SODIUM mmol/L 136   POTASSIUM mmol/L 3 1*   CHLORIDE mmol/L 100   CO2 mmol/L 26   BUN mg/dL 7   CREATININE mg/dL 1 04   ANION GAP mmol/L 10   CALCIUM mg/dL 8 9   ALBUMIN g/dL 2 7*   TOTAL BILIRUBIN mg/dL 2 36*   ALK PHOS U/L 195*   ALT U/L 68   AST U/L 91*   GLUCOSE RANDOM mg/dL 141*         Results from last 7 days   Lab Units 08/01/21  2128 08/01/21  1521 08/01/21  1035 08/01/21  0738 07/31/21  2157 07/31/21  1543 07/31/21  1132 07/31/21  0724 07/31/21  0440 07/30/21  2133   POC GLUCOSE mg/dl 174* 148* 189* 164* 167* 157* 190* 139 149* 269*     Results from last 7 days   Lab Units 07/30/21  2132   HEMOGLOBIN A1C % 5 9*           Lines/Drains:  Invasive Devices     Peripheral Intravenous Line            Peripheral IV 07/30/21 Left Antecubital 2 days                      Imaging: No pertinent imaging reviewed  Recent Cultures (last 7 days):         Last 24 Hours Medication List:   Current Facility-Administered Medications   Medication Dose Route Frequency Provider Last Rate    amLODIPine  5 mg Oral Daily KristianINESSA Hedrick      folic acid 1 mg, thiamine 100 mg in 0 9% sodium chloride 100 mL IVPB   Intravenous Daily INESSA Bower          Today, Patient Was Seen By: Praveen Chan MD    **Please Note: This note may have been constructed using a voice recognition system  **

## 2021-08-02 NOTE — DISCHARGE INSTRUCTIONS
Abuse of Alcohol   WHAT YOU NEED TO KNOW:   Alcohol abuse means you drink more than the recommended daily or weekly limits  You may be drinking alcohol regularly or drinking large amounts in a short period of time (binge drinking)  You continue to drink even though it causes legal, work, or relationship problems  DISCHARGE INSTRUCTIONS:   Call your local emergency number (911 in the [address]) for any of the following:   · You have sudden chest pain or trouble breathing  · You want to harm yourself or others  · You have a seizure or have shaking or trembling  Call your doctor if:   · You have hallucinations (you see or hear things that are not real)  · You cannot stop vomiting or you vomit blood  · You need help to stop drinking alcohol  · You have questions or concerns about your condition or care  Medicines:   · Vitamin supplements  may be given to treat low vitamin levels  Alcohol can make it hard for your body to absorb enough vitamins such as B1  Vitamin supplements may also be given to prevent alcohol related brain damage  · Take your medicine as directed  Contact your healthcare provider if you think your medicine is not helping or if you have side effects  Tell him or her if you are allergic to any medicine  Keep a list of the medicines, vitamins, and herbs you take  Include the amounts, and when and why you take them  Bring the list or the pill bottles to follow-up visits  Carry your medicine list with you in case of an emergency      Health problems alcohol abuse can cause:   · Cancer in your liver, pancreas, stomach, colon, kidney, or breast    · Stroke or a heart attack    · Liver, kidney, or lung disease    · Blackouts, memory loss, brain damage, or dementia    · Diabetes, immune system problems, or thiamine (vitamin B1) deficiency    · Problems for you and your baby if you drink while pregnant    Recommended alcohol limits:   · Men 21 to 64 years  should limit alcohol to 2 drinks a day  Do not have more than 4 drinks in 1 day or more than 14 in 1 week  · All women, and men 72 or older  should limit alcohol to 1 drink in a day  Do not have more than 3 drinks in 1 day or more than 7 in 1 week  No amount of alcohol is okay during pregnancy  Manage alcohol use:   · Decrease the amount you drink  This can help prevent health problems such as brain, heart, and liver damage, high blood pressure, diabetes, and cancer  If you cannot stop completely, healthcare providers can help you set goals to decrease the amount you drink  · Plan weekly alcohol use  You will be less likely to drink more than the recommended limit if you plan ahead  · Have food when you drink alcohol  Food will prevent alcohol from getting into your system too quickly  Eat before you have your first alcohol drink  · Time your drinks carefully  Have no more than 1 drink in an hour  Have a liquid such as water, coffee, or a soft drink between alcohol drinks  · Do not drive if you have had alcohol  Make sure someone who has not been drinking can help you get home  · Do not drink alcohol if you are taking medicine  Alcohol is dangerous when you combine it with certain medicines, such as acetaminophen or blood pressure medicine  Talk to your healthcare provider about all the medicines you currently take  Follow up with your healthcare provider as directed:  Write down your questions so you remember to ask them during your visits  For support and more information:   · Alcoholics Anonymous  Web Address: http://www bruce info/    · Substance Abuse and [address] , [address] [place]  Web Address: https://[employer]/  © 2449 [facility] 2021 Information is for End User's use only and may not be sold, redistributed or otherwise used for commercial purposes   All illustrations and images included in CareNotes® are the copyrighted property of A D A M , Inc  or [employer] [facility]  The above information is an  only  It is not intended as medical advice for individual conditions or treatments  Talk to your doctor, nurse or pharmacist before following any medical regimen to see if it is safe and effective for you

## 2021-08-02 NOTE — NURSING NOTE
Patient Instructions by Chon White MD at 06/18/18 01:22 PM     Author:  Chon White MD Service:  (none) Author Type:  Physician     Filed:  06/18/18 01:24 PM Encounter Date:  6/18/2018 Status:  Signed     :  Chon White MD (Physician)            PLAN:    Continue the Latanoprost drops in both eyes in the evening.  Continue the  Brimonidine  drops in the right eye 3 times a day for now.  Schedule  for a micropulse cyclophotocoagulation in the right eye    Additional Educational Resources:  For additional resources regarding your symptoms, diagnosis, or further health information, please visit the Health Resources section on [email or web address] or the Online Health Resources section in [employer].        Revision History        User Key Date/Time User Provider Type Action    > [N/A] 06/18/18 01:24 PM Chon White MD Physician Sign             Patient spouse at bedside  Gait steady  IV DC and intact  All belongings accounted for   MD came to patient room prior to discharge per wife's request

## 2021-08-03 ENCOUNTER — TRANSITIONAL CARE MANAGEMENT (OUTPATIENT)
Dept: FAMILY MEDICINE CLINIC | Facility: CLINIC | Age: 46
End: 2021-08-03

## 2021-08-05 ENCOUNTER — TELEPHONE (OUTPATIENT)
Dept: NEUROLOGY | Facility: CLINIC | Age: 46
End: 2021-08-05

## 2021-08-05 NOTE — TELEPHONE ENCOUNTER
1ST ATTEMPT LMOM for pt to call in to sched HFU appt  SLRA/SZ Like Activity/VERIFY INSURANCE???    NOTE FROM CHART:  Reason for Consult / Principal Problem:  Seizure-like activity  Recommendations for outpatient neurological follow up have yet to be determined

## 2021-08-06 ENCOUNTER — OFFICE VISIT (OUTPATIENT)
Dept: FAMILY MEDICINE CLINIC | Facility: CLINIC | Age: 46
End: 2021-08-06

## 2021-08-06 VITALS
HEART RATE: 86 BPM | OXYGEN SATURATION: 98 % | BODY MASS INDEX: 30.99 KG/M2 | RESPIRATION RATE: 16 BRPM | DIASTOLIC BLOOD PRESSURE: 80 MMHG | TEMPERATURE: 98.2 F | SYSTOLIC BLOOD PRESSURE: 124 MMHG | HEIGHT: 65 IN | WEIGHT: 186 LBS

## 2021-08-06 DIAGNOSIS — R73.9 ELEVATED SERUM GLUCOSE: ICD-10-CM

## 2021-08-06 DIAGNOSIS — I10 ESSENTIAL HYPERTENSION: ICD-10-CM

## 2021-08-06 DIAGNOSIS — R73.02 IGT (IMPAIRED GLUCOSE TOLERANCE): ICD-10-CM

## 2021-08-06 DIAGNOSIS — R79.89 ELEVATED LFTS: ICD-10-CM

## 2021-08-06 DIAGNOSIS — R56.9 ALCOHOL WITHDRAWAL SEIZURE WITH COMPLICATION (HCC): Primary | ICD-10-CM

## 2021-08-06 DIAGNOSIS — F10.239 ALCOHOL WITHDRAWAL SEIZURE WITH COMPLICATION (HCC): Primary | ICD-10-CM

## 2021-08-06 DIAGNOSIS — E78.2 MIXED HYPERLIPIDEMIA: ICD-10-CM

## 2021-08-06 PROCEDURE — 99496 TRANSJ CARE MGMT HIGH F2F 7D: CPT | Performed by: FAMILY MEDICINE

## 2021-08-06 NOTE — Clinical Note
Good morning, this patient was recently hospitalized  He has no medical insurance  Please see my note for details  I am not sure if application was started while he was inpatient     Thank you

## 2021-08-06 NOTE — PROGRESS NOTES
FAMILY PRACTICE OFFICE VISIT       NAME: Khadra Lee  AGE: 51 y o  SEX: male       : 1975        MRN: [id number]        Assessment and Plan     1  Alcohol withdrawal seizure with complication [facility])  Assessment & Plan:   New onset of seizures /likely dueto  alcohol withdrawal   Reportedly tonic clonic seizure at home while watching TV witnessed by wife  History of heavy alcohol use for 10 years drinking at least half a bottle of vodka daily  Patient remains sober since 2021   ED workup included negative CT brain  Patient was managed with IV Valium and phenobarbital in ED  Neurology consult while inpatient  Studies included MRI brain and EEG  MRI brain : Subtle areas of T2 hyperintensity along the frontal horn of the right lateral ventricle which may be reflective of small old lacunar infarcts and gliosis   No acute ischemia, mass or hemorrhage  EEG : This was a normal awake, drowsy and sleep routine EEG study  No electrographic seizures, interictal epileptiform discharges (seizure tendency) or focal slowing were seen  Patient remains on vitamin B complex and multivitamin  Patient claims complete sobriety  No recurrences of seizure  No current follow-up with Neurology        2  Elevated LFTs  Assessment & Plan:  Elevated LFTs and bilirubin  No imaging studies performed while inpatient  Patient is sober for over a week  No abdominal pain, nausea, vomiting or dyspepsia, normal BMs and appetite  Patient is reluctant to pursue right upper quadrant ultrasound since he has no medical insurance  Repeat LFTs in 7 to 10 days  Repeat CMP, hemoglobin A1c and lipid panel in mid September    Orders:  -     Hepatic function panel; Future  -     Comprehensive metabolic panel; Future  -     Hemoglobin A1C; Future    3  Essential hypertension  Assessment & Plan:   Patient has been off amlodipine upon discharge      He is monitoring blood pressures at home, all BP is a below 130/80  Patient may continue watchful observation being of medication  If blood pressures are elevated-he will contact me immediately and will restart medication      4  Mixed hyperlipidemia  -     Lipid Panel with Direct LDL reflex; Future    5  IGT (impaired glucose tolerance)  -     Hemoglobin A1C; Future    6  Elevated serum glucose  Assessment & Plan:   Elevated blood glucose in the setting of seizure and alcohol withdrawal     Check hemoglobin A1c  Patient presents for follow-up after recent hospitalization due to new onset of seizure, likely alcohol withdrawal seizure  History of heavy alcohol use for over 10 years  Patient has been sober for over a week and denies any cravings  Will continue close lab monitoring  Patient is reluctant to proceed with right upper quadrant ultrasound as he has no medical insurance  I will consult  outpatient   I emphasized importance of complete abstinence of alcohol and significant health risks  Both patient and wife understand and agree  Patient's 's license has been revoked for 6 months  BMI Counseling: Body mass index is 30 95 kg/m²  The BMI is above normal  Nutrition recommendations include encouraging healthy choices of fruits and vegetables, consuming healthier snacks, moderation in carbohydrate intake, increasing intake of lean protein, reducing intake of saturated and trans fat and reducing intake of cholesterol  Exercise recommendations include exercising 3-5 times per week  No pharmacotherapy was ordered  Patient referred to PCP due to patient being overweight  There are no Patient Instructions on file for this visit  Return in about 6 weeks (around 9/20/2021) for follow up  Discussed with the patient and all questioned fully answered  He will call me if any problems arise  [employer] software was used to dictate this note  It may contain errors with dictating incorrect words/spelling   Please contact provider directly with any questions  Chief Complaint     Chief Complaint   Patient presents with    Transition of Care Management       History of Present Illness     TCM Call (since 7/6/2021)     Date and time call was made  8/3/2021  1:43 PM    Hospital care reviewed  Records reviewed    Patient was hospitialized at  [address] [place] [address]        Date of Admission  07/30/21    Date of discharge  08/02/21    Diagnosis  Alcohol withdrawal seizure    Disposition  Home    Were the patients medications reviewed and updated  No    Current Symptoms  None      TCM Call (since 7/6/2021)     Post hospital issues  None    Should patient be enrolled in anticoag monitoring? No    Scheduled for follow up? Yes    Did you obtain your prescribed medications  Yes    Do you need help managing your prescriptions or medications  No    Is transportation to your appointment needed  No    I have advised the patient to call PCP with any new or worsening symptoms    Daryl Samuels, [address] [place] [address]  Parents    Support System  Parent    Are you recieving any outpatient services  No    Are you recieving home care services  No    Interperter language line needed  No    Counseling  Family    Counseling topics  Importance of RX compliance    Comments  Apt scheduled for 8/6th at 2:15 pm        Patient presents for follow-up after recent hospitalization due to new onset of seizures  This episode was witnessed by patient's wife as he was watching TV and developed uncontrollable shaking  Patient was evaluated emergency room and treated with phenobarbital and Valium  He had extensive workup while inpatient including CT brain, MRI brain and EEG  Patient was seen by Neurology  He has declined referral for alcohol rehab upon discharge  Patient is a heavy drinker for over 10 years  He reportedly has been drinking at least half a bottle of vodka daily  Patient reports complete sobriety since July 26   He denies any cravings for alcohol at present time  Patient's 's license has been revoked for 6 months  Patient does not have pending follow-up with Neurology  He denies symptoms of headaches, dizziness or visual changes  No recurrences of seizures  No tremors or shakiness  Patient denies any chest pain, palpitations, shortness of breath or dizziness  He actually feels pretty well  No fever  Appetite is normal   No abdominal pain, normal bowel movements  No nausea, vomiting or diarrhea  Patient was initiated on BP med upon discharge due to elevated blood pressures while inpatient  He took medication at home for a day and then stop since his blood pressures have been running low  Patient has blood pressure cuff at home and checks it daily  Blood pressure today was 125/82  He has been off amlodipine for 3 days  We reviewed results of diagnostic testing performed while inpatient  I reviewed results of EEG and MRI with patient and his wife  No recent routine checkup, no recent lipid performed  MRI indicates possibility of lacunar infarcts  Elevated LFTs: Patient has no GI complaints or symptoms  Patient declines a referral for right upper quadrant ultrasound, he is concerned about cost as he does not have medical insurance  We will continue close LFT lab  monitoring  Review of Systems   Review of Systems   Constitutional: Negative  HENT: Negative  Eyes: Negative  Respiratory: Negative  Cardiovascular: Negative  Gastrointestinal: Negative  Endocrine: Negative  Genitourinary: Negative  Musculoskeletal: Negative  Skin: Negative  Allergic/Immunologic: Negative  Neurological: Negative  Hematological: Negative  Psychiatric/Behavioral: Negative          Active Problem List     Patient Active Problem List   Diagnosis    Essential hypertension    Abscess of hand, left    Acute lymphangitis of left upper limb    Alcohol withdrawal seizure ([facility] [address] )  Elevated serum glucose    Elevated LFTs    Hypokalemia       Past Medical History:  Past Medical History:   Diagnosis Date    Hypertension        Past Surgical History:  History reviewed  No pertinent surgical history  Family History:  History reviewed  No pertinent family history  Social History:  Social History     Socioeconomic History    Marital status: /Civil Union     Spouse name: Not on file    Number of children: Not on file    Years of education: Not on file    Highest education level: Not on file   Occupational History    Not on file   Tobacco Use    Smoking status: Heavy Tobacco Smoker     Packs/day: 0 50     Types: Cigarettes    Smokeless tobacco: Never Used   Substance and Sexual Activity    Alcohol use: Yes     Comment: socially    Drug use: No    Sexual activity: Not on file   Other Topics Concern    Not on file   Social History Narrative    Not on file     Social Determinants of Health     Financial Resource Strain:     Difficulty of Paying Living Expenses:    Food Insecurity:     Worried About Running Out of Food in the Last Year:     [address] in the Last Year:    Transportation Needs:     Lack of Transportation (Medical):      Lack of Transportation (Non-Medical):    Physical Activity:     Days of Exercise per Week:     Minutes of Exercise per Session:    Stress:     Feeling of Stress :    Social Connections:     Frequency of Communication with Friends and Family:     Frequency of Social Gatherings with Friends and Family:     Attends Voodoo Services:     Active Member of Clubs or Organizations:     Attends Club or Organization Meetings:     Marital Status:    Intimate Partner Violence:     Fear of Current or Ex-Partner:     Emotionally Abused:     Physically Abused:     Sexually Abused:          Objective     Vitals:    08/06/21 1413 08/06/21 1506   BP: 136/96 124/80   BP Location: Left arm    Patient Position: Sitting    Cuff Size: Adult Pulse: 86    Resp: 16    Temp: 98 2 °F (36 8 °C)    TempSrc: Temporal    SpO2: 98%    Weight: 84 4 kg (186 lb)    Height: 5' 5" (1 651 m)        Wt Readings from Last 3 Encounters:   08/06/21 84 4 kg (186 lb)   07/30/21 83 7 kg (184 lb 8 4 oz)   11/15/16 77 1 kg (169 lb 15 6 oz)       Physical Exam  Vitals and nursing note reviewed  Constitutional:       General: He is not in acute distress  Appearance: Normal appearance  He is well-developed  He is not ill-appearing  HENT:      Head: Normocephalic and atraumatic  Eyes:      General: No scleral icterus  Conjunctiva/sclera: Conjunctivae normal    Neck:      Vascular: No carotid bruit  Cardiovascular:      Rate and Rhythm: Normal rate and regular rhythm  Heart sounds: Normal heart sounds  No murmur heard  Pulmonary:      Effort: Pulmonary effort is normal  No respiratory distress  Breath sounds: Normal breath sounds  No wheezing or rales  Abdominal:      General: Bowel sounds are normal  There is no distension or abdominal bruit  Palpations: Abdomen is soft  Tenderness: There is no abdominal tenderness  Hernia: No hernia is present  Musculoskeletal:         General: Normal range of motion  Cervical back: Neck supple  No rigidity  Right lower leg: No edema  Skin:     General: Skin is warm  Coloration: Skin is not jaundiced  Findings: No bruising  Neurological:      General: No focal deficit present  Mental Status: He is alert and oriented to person, place, and time  Cranial Nerves: No cranial nerve deficit  Psychiatric:         Mood and Affect: Mood normal          Behavior: Behavior normal          Thought Content:  Thought content normal           Pertinent Laboratory/Diagnostic Studies:    Lab Results   Component Value Date    WBC 6 67 07/31/2021    HGB 14 0 07/31/2021    HCT 39 7 07/31/2021     (H) 07/31/2021     07/31/2021       No results found for: TSH    No results found for: CHOL  Lab Results   Component Value Date    TRIG 269 (H) 07/30/2021     Lab Results   Component Value Date    HDL 81 07/30/2021     Lab Results   Component Value Date    LDLCALC 192 (H) 07/30/2021     Lab Results   Component Value Date    HGBA1C 5 9 (H) 07/30/2021     Lab Results   Component Value Date    SODIUM 140 08/02/2021    K 3 9 08/02/2021     08/02/2021    CO2 28 08/02/2021    AGAP 10 08/02/2021    BUN 10 08/02/2021    CREATININE 0 78 08/02/2021    GLUC 135 08/02/2021    CALCIUM 10 0 08/02/2021     (H) 08/02/2021     (H) 08/02/2021    ALKPHOS 213 (H) 08/02/2021    TP 7 0 08/02/2021    TBILI 1 06 (H) 08/02/2021    EGFR 107 08/02/2021       Orders Placed This Encounter   Procedures    Hepatic function panel    Comprehensive metabolic panel    Hemoglobin A1C    Lipid Panel with Direct LDL reflex       ALLERGIES:  No Known Allergies    Current Medications     Current Outpatient Medications   Medication Sig Dispense Refill    amLODIPine (NORVASC) 5 mg tablet Take 1 tablet (5 mg total) by mouth daily (Patient not taking: Reported on 8/6/2021) 30 tablet 0    bacitracin ointment Apply topically 2 (two) times a day (Patient not taking: Reported on 7/30/2021)       No current facility-administered medications for this visit         Medications Discontinued During This Encounter   Medication Reason    losartan-hydrochlorothiazide (HYZAAR) 50-12 5 mg per tablet        Health Maintenance     Health Maintenance   Topic Date Due    Pneumococcal Vaccine: Pediatrics (0 to 5 Years) and At-Risk Patients (6 to 59 Years) (1 of 2 - PPSV23) Never done    COVID-19 Vaccine (1) Never done    HIV Screening  Never done    BMI: Followup Plan  Never done    Annual Physical  Never done    DTaP,Tdap,and Td Vaccines (1 - Tdap) Never done    Influenza Vaccine (1) 09/01/2021    Depression Screening PHQ  08/06/2022    BMI: Adult  08/06/2022    Hepatitis C Screening  Completed    HIB Vaccine  Aged Out    Hepatitis B Vaccine  Aged Out    IPV Vaccine  Aged Out    Hepatitis A Vaccine  Aged Out    Meningococcal ACWY Vaccine  Aged Out    HPV Vaccine  Aged Out       Immunization History   Administered Date(s) Administered    Influenza Quadrivalent Preservative Free 3 years and older IM 09/22/2016       Markie Dickson MD

## 2021-08-09 NOTE — TELEPHONE ENCOUNTER
3RD ATTEMPT LMOM for pt to call in to sched HFU appt    Mailed 3rd attempt letter to pt's home       SLRA/SZ Like Activity/VERIFY INSURANCE???

## 2021-08-11 PROBLEM — R79.89 ELEVATED LFTS: Chronic | Status: ACTIVE | Noted: 2021-07-31

## 2021-08-11 NOTE — ASSESSMENT & PLAN NOTE
Elevated LFTs and bilirubin  No imaging studies performed while inpatient  Patient is sober for over a week  No abdominal pain, nausea, vomiting or dyspepsia, normal BMs and appetite  Patient is reluctant to pursue right upper quadrant ultrasound since he has no medical insurance  Repeat LFTs in 7 to 10 days      Repeat CMP, hemoglobin A1c and lipid panel in mid September
Elevated blood glucose in the setting of seizure and alcohol withdrawal     Check hemoglobin A1c 
New onset of seizures /likely dueto  alcohol withdrawal   Reportedly tonic clonic seizure at home while watching TV witnessed by wife  History of heavy alcohol use for 10 years drinking at least half a bottle of vodka daily  Patient remains sober since July 26, 2021   ED workup included negative CT brain  Patient was managed with IV Valium and phenobarbital in ED  Neurology consult while inpatient  Studies included MRI brain and EEG  MRI brain : Subtle areas of T2 hyperintensity along the frontal horn of the right lateral ventricle which may be reflective of small old lacunar infarcts and gliosis   No acute ischemia, mass or hemorrhage  EEG : This was a normal awake, drowsy and sleep routine EEG study  No electrographic seizures, interictal epileptiform discharges (seizure tendency) or focal slowing were seen  Patient remains on vitamin B complex and multivitamin  Patient claims complete sobriety  No recurrences of seizure      No current follow-up with Neurology
Patient has been off amlodipine upon discharge  He is monitoring blood pressures at home, all BP is a below 130/80  Patient may continue watchful observation being of medication    If blood pressures are elevated-he will contact me immediately and will restart medication
Statement Selected

## 2021-08-12 ENCOUNTER — PATIENT OUTREACH (OUTPATIENT)
Dept: FAMILY MEDICINE CLINIC | Facility: CLINIC | Age: 46
End: 2021-08-12

## 2021-08-12 DIAGNOSIS — Z78.9 NEED FOR FOLLOW-UP BY SOCIAL WORKER: Primary | ICD-10-CM

## 2021-08-12 NOTE — PROGRESS NOTES
Referral received from Dr Stone Franklin with request for [facility] to outreach patient and assist with insurance coverage needs  Patient currently has no health insurance coverage, was recently in the hospital, and has history of alcohol abuse (hospitalization was likely alcohol withdrawal seizure)  Outreached patient to further discuss  Patient confirmed he has no health insurance coverage at this time and did receive documents in the mail from the PATHS Department through the hospital   Nabil Melchor is requesting documents/paperwork from patient in order to submit for emergent MA for patient's hospital stay coverage  This application will also be reviewed for long-term MA eligibility  Patient has not yet provided these documents/informaiton to PATHS  Encouraged patient to gather this and send in to PATHS as soon as he is able and advised him to call [facility] with any questions  Patient agreed  Patient denied interest in alcohol treatment program at this time  [facility] encouraged patient to call if he would want assistance in the future  No other needs  Patient agreed for [facility] to follow-up in roughly 1 week to check status of PATHS documents  Update routed to Dr Stone Franklin

## 2021-08-19 ENCOUNTER — TELEPHONE (OUTPATIENT)
Dept: FAMILY MEDICINE CLINIC | Facility: CLINIC | Age: 46
End: 2021-08-19

## 2021-08-19 DIAGNOSIS — R79.89 ELEVATED LFTS: Primary | Chronic | ICD-10-CM

## 2021-08-19 NOTE — TELEPHONE ENCOUNTER
Please contact patient or his wife  I received results of blood work  One of liver tests has improved, the other 2 liver tests are slightly higher than before  I strongly recommend to proceed with right upper quadrant / liver ultrasound  I will place orders    Thank you

## 2021-08-20 ENCOUNTER — PATIENT OUTREACH (OUTPATIENT)
Dept: FAMILY MEDICINE CLINIC | Facility: CLINIC | Age: 46
End: 2021-08-20

## 2021-08-20 NOTE — PROGRESS NOTES
Outreached patient to check status and follow-up on PATHS application process for hospital coverage and MA  Patient stated he is doing fine and confirmed that he mailed in needed paperwork for PATHS Department  No update received from [facility] at this time  Will outreach PATHS next week to check status  Patient denied any additional needs at this time

## 2021-08-27 ENCOUNTER — PATIENT OUTREACH (OUTPATIENT)
Dept: FAMILY MEDICINE CLINIC | Facility: CLINIC | Age: 46
End: 2021-08-27

## 2021-08-27 NOTE — PROGRESS NOTES
Outreached patient to check status of PATHS application  Patient stated he has not received any updates from [facility] since mailing in needed documents  OPCM SW outreached [facility] to check status of application for hospital coverage and inquire if MA application should be started vs held off at this time due to [facility] application being in process  No answer from [facility], voicemail left, and awaiting return call  Will inform patient once update from [facility] is known

## 2021-08-30 ENCOUNTER — PATIENT OUTREACH (OUTPATIENT)
Dept: FAMILY MEDICINE CLINIC | Facility: CLINIC | Age: 46
End: 2021-08-30

## 2021-08-30 NOTE — PROGRESS NOTES
Return call received from Tiffany Paulson through [facility] confirming patient's application was started for hospital coverage  PATHS received patient's documents, which included his wife's income information  PATHS will now review patient's information and send to the [facility] who will review for ongoing MA insurance coverage  Will outreach patient in roughly 2 weeks to check status of PATHS application/determination

## 2021-09-03 ENCOUNTER — HOSPITAL ENCOUNTER (OUTPATIENT)
Dept: ULTRASOUND IMAGING | Facility: HOSPITAL | Age: 46
Discharge: HOME | End: 2021-09-03
Payer: COMMERCIAL

## 2021-09-03 DIAGNOSIS — R79.89 ELEVATED LFTS: Chronic | ICD-10-CM

## 2021-09-03 PROCEDURE — 76705 ECHO EXAM OF ABDOMEN: CPT

## 2021-09-08 ENCOUNTER — TELEPHONE (OUTPATIENT)
Dept: FAMILY MEDICINE CLINIC | Facility: CLINIC | Age: 46
End: 2021-09-08

## 2021-09-08 NOTE — TELEPHONE ENCOUNTER
I spoke with patient regarding results of right upper quadrant ultrasound  It reveals hepatomegaly, fatty liver and cholelithiasis  No evidence of cholecystitis  Patient denies symptoms of right upper quadrant pain, nausea vomiting or dyspepsia  Patient states that he has been feeling well  He will repeat blood work in mid-September prior to our next office visit on September 21st   Patient remains sober  I emphasized importance of alcohol abstinence and low-fat diet  Patient will keep his follow-up appointment on September 21st   We discussed red flags for symptoms of biliary colic

## 2021-09-13 ENCOUNTER — PATIENT OUTREACH (OUTPATIENT)
Dept: FAMILY MEDICINE CLINIC | Facility: CLINIC | Age: 46
End: 2021-09-13

## 2021-09-13 NOTE — PROGRESS NOTES
Outreached patient to check status of PATHS application  Patient heard from PATHS a few days ago  They are requesting additional information (one document is copy of patient's wife's ID)  Patient awaiting list of additional needed documents and will send back to [facility] once received via mail  Will follow-up in 3 weeks

## 2021-09-21 ENCOUNTER — OFFICE VISIT (OUTPATIENT)
Dept: FAMILY MEDICINE CLINIC | Facility: CLINIC | Age: 46
End: 2021-09-21

## 2021-09-21 VITALS
WEIGHT: 192.25 LBS | BODY MASS INDEX: 32.03 KG/M2 | HEIGHT: 65 IN | DIASTOLIC BLOOD PRESSURE: 70 MMHG | RESPIRATION RATE: 18 BRPM | TEMPERATURE: 98 F | HEART RATE: 77 BPM | SYSTOLIC BLOOD PRESSURE: 122 MMHG | OXYGEN SATURATION: 97 %

## 2021-09-21 DIAGNOSIS — R79.89 ELEVATED LFTS: Chronic | ICD-10-CM

## 2021-09-21 DIAGNOSIS — E78.2 MIXED HYPERLIPIDEMIA: Primary | ICD-10-CM

## 2021-09-21 DIAGNOSIS — R73.02 IGT (IMPAIRED GLUCOSE TOLERANCE): ICD-10-CM

## 2021-09-21 DIAGNOSIS — K80.20 ASYMPTOMATIC GALLSTONES: ICD-10-CM

## 2021-09-21 DIAGNOSIS — I10 ESSENTIAL HYPERTENSION: ICD-10-CM

## 2021-09-21 PROCEDURE — 99214 OFFICE O/P EST MOD 30 MIN: CPT | Performed by: FAMILY MEDICINE

## 2021-09-21 NOTE — PROGRESS NOTES
FAMILY PRACTICE OFFICE VISIT       NAME: Khadra Lee  AGE: 51 y o  SEX: male       : 1975        MRN: [id number]        Assessment and Plan     1  Mixed hyperlipidemia  Assessment & Plan:   High cholesterol and triglycerides  Family history of hyperlipidemia:  Mother  Patient prefers to hold of medications and would like to continue with lifestyle changes, exercise and weight loss  Reassess in 3 months    Orders:  -     Comprehensive metabolic panel; Future; Expected date: 2021  -     Lipid Panel with Direct LDL reflex; Future; Expected date: 2021  -     TSH, 3rd generation; Future; Expected date: 2021    2  IGT (impaired glucose tolerance)  Assessment & Plan:  Hemoglobin A1c 6 3%    We discussed trial of metformin, patient is reluctant to start any medications  He is determined to continue with weight loss exercise, he will repeat blood work in 3 months    Orders:  -     Hemoglobin A1C; Future; Expected date: 2021    3  Asymptomatic gallstones  Assessment & Plan:   As per right upper quadrant ultrasound 2021  We discussed red flags      4  Elevated LFTs  Assessment & Plan:   LFTs have been improving  Continue with lifestyle changes, weight loss, exercise, alcohol abstinence             There are no Patient Instructions on file for this visit  Return in about 3 months (around 2021) for Check up  Discussed with the patient and all questioned fully answered  He will call me if any problems arise  M*Modal software was used to dictate this note  It may contain errors with dictating incorrect words/spelling  Please contact provider directly with any questions  Chief Complaint     Chief Complaint   Patient presents with    Follow-up     10 w     Well Check       History of Present Illness     Patient presents for follow-up  He has been feeling well and stably  He offers no complaints today  Patient has been sober  No cravings    He denies symptoms of abdominal pain, nausea, vomiting or dyspepsia  No symptoms of chest pain, palpitations, shortness of breath or dizziness  Patient states that he has been following healthy diet  He started walking on a daily basis  He is hoping to start jogging again  We reviewed results of recent right upper quadrant ultrasound and blood work  Ultrasound reveals gallstones  No evidence of cholecystitis  Patient is completely asymptomatic  He denies symptoms of abdominal pain, nausea, right upper quadrant pain or any postprandial symptoms  Bowel movements are regular  Blood work reveals significant improvement of LFTs  Alkaline phosphatase is  Down to 199, AST 73, ALT is 88, bilirubin is normal at 0 6  Patient's cholesterol is high 284 with triglycerides of 422 and LDL of 145  Fasting blood glucose is 171, hemoglobin A1c 6 3  Family history of hyperlipidemia: Mother  No family history of diabetes  Review of Systems   Review of Systems   Constitutional: Negative  HENT: Negative  Eyes: Negative  Respiratory: Negative  Cardiovascular: Negative  Gastrointestinal: Negative  Endocrine: Negative  Genitourinary: Negative  Musculoskeletal: Negative  Skin: Negative  Allergic/Immunologic: Negative  Neurological: Negative  Hematological: Negative  Psychiatric/Behavioral: Negative  Active Problem List     Patient Active Problem List   Diagnosis    Essential hypertension    Abscess of hand, left    Acute lymphangitis of left upper limb    Alcohol withdrawal seizure ([address] )    IGT (impaired glucose tolerance)    Elevated LFTs    Alcohol use disorder, moderate, in early remission ([address] )    Asymptomatic gallstones    Mixed hyperlipidemia       Past Medical History:  Past Medical History:   Diagnosis Date    Hypertension        Past Surgical History:  History reviewed  No pertinent surgical history  Family History:  History reviewed   No pertinent family history  Social History:  Social History     Socioeconomic History    Marital status: /Civil Union     Spouse name: Not on file    Number of children: Not on file    Years of education: Not on file    Highest education level: Not on file   Occupational History    Not on file   Tobacco Use    Smoking status: Heavy Tobacco Smoker     Packs/day: 0 50     Types: Cigarettes    Smokeless tobacco: Never Used   Vaping Use    Vaping Use: Never used   Substance and Sexual Activity    Alcohol use: Yes     Comment: socially    Drug use: No    Sexual activity: Not on file   Other Topics Concern    Not on file   Social History Narrative    Not on file     Social Determinants of Health     Financial Resource Strain:     Difficulty of Paying Living Expenses:    Food Insecurity:     Worried About Running Out of Food in the Last Year:     Ran Out of Food in the Last Year:    Transportation Needs:     Lack of Transportation (Medical):  Lack of Transportation (Non-Medical):    Physical Activity:     Days of Exercise per Week:     Minutes of Exercise per Session:    Stress:     Feeling of Stress :    Social Connections:     Frequency of Communication with Friends and Family:     Frequency of Social Gatherings with Friends and Family:     Attends Evangel[employer] Services:     Active Member of Clubs or Organizations:     Attends Club or Organization Meetings:     Marital Status:    Intimate Partner Violence:     Fear of Current or Ex-Partner:     Emotionally Abused:     Physically Abused:     Sexually Abused:          Objective     Vitals:    09/21/21 1459 09/21/21 1535   BP: 140/80 122/70   Pulse: 77    Resp: 18    Temp: 98 °F (36 7 °C)    SpO2: 97%    Weight: 87 2 kg (192 lb 4 oz)    Height: 5' 5" (1 651 m)        Wt Readings from Last 3 Encounters:   09/21/21 87 2 kg (192 lb 4 oz)   08/06/21 84 4 kg (186 lb)   07/30/21 83 7 kg (184 lb 8 4 oz)       Physical Exam  Vitals and nursing note reviewed  Constitutional:       General: He is not in acute distress  Appearance: Normal appearance  He is well-developed  He is not ill-appearing  HENT:      Head: Normocephalic and atraumatic  Eyes:      Conjunctiva/sclera: Conjunctivae normal    Neck:      Vascular: No carotid bruit  Cardiovascular:      Rate and Rhythm: Normal rate and regular rhythm  Heart sounds: Normal heart sounds  No murmur heard  Pulmonary:      Effort: Pulmonary effort is normal  No respiratory distress  Breath sounds: Normal breath sounds  No wheezing or rales  Abdominal:      General: Bowel sounds are normal  There is no distension or abdominal bruit  Palpations: Abdomen is soft  Tenderness: There is no abdominal tenderness  Musculoskeletal:         General: Normal range of motion  Cervical back: Neck supple  Right lower leg: No edema  Left lower leg: No edema  Neurological:      General: No focal deficit present  Mental Status: He is alert and oriented to person, place, and time  Cranial Nerves: No cranial nerve deficit     Psychiatric:         Mood and Affect: Mood normal          Behavior: Behavior normal           Pertinent Laboratory/Diagnostic Studies:    Lab Results   Component Value Date    WBC 6 67 07/31/2021    HGB 14 0 07/31/2021    HCT 39 7 07/31/2021     (H) 07/31/2021     07/31/2021       No results found for: TSH    No results found for: CHOL  Lab Results   Component Value Date    TRIG 269 (H) 07/30/2021     Lab Results   Component Value Date    HDL 81 07/30/2021     Lab Results   Component Value Date    LDLCALC 192 (H) 07/30/2021     Lab Results   Component Value Date    HGBA1C 6 3 09/15/2021     Lab Results   Component Value Date    SODIUM 140 08/02/2021    K 3 9 08/02/2021     08/02/2021    CO2 28 08/02/2021    AGAP 10 08/02/2021    BUN 10 08/02/2021    CREATININE 0 78 08/02/2021    GLUC 135 08/02/2021    CALCIUM 10 0 08/02/2021     (H) 08/02/2021     (H) 08/02/2021    ALKPHOS 213 (H) 08/02/2021    TP 7 0 08/02/2021    TBILI 1 06 (H) 08/02/2021    EGFR 107 08/02/2021       Orders Placed This Encounter   Procedures    Comprehensive metabolic panel    Hemoglobin A1C    Lipid Panel with Direct LDL reflex    TSH, 3rd generation       ALLERGIES:  No Known Allergies    Current Medications     Current Outpatient Medications   Medication Sig Dispense Refill    bacitracin ointment Apply topically 2 (two) times a day (Patient not taking: Reported on 7/30/2021)       No current facility-administered medications for this visit         Medications Discontinued During This Encounter   Medication Reason    amLODIPine (NORVASC) 5 mg tablet        Health Maintenance     Health Maintenance   Topic Date Due    Pneumococcal Vaccine: Pediatrics (0 to 5 Years) and At-Risk Patients (6 to 59 Years) (1 of 2 - PPSV23) Never done    COVID-19 Vaccine (1) Never done    HIV Screening  Never done    Annual Physical  Never done    DTaP,Tdap,and Td Vaccines (1 - Tdap) Never done    Influenza Vaccine (1) 06/30/2022 (Originally 9/1/2021)    Depression Screening  08/06/2022    BMI: Followup Plan  08/11/2022    BMI: Adult  09/21/2022    Hepatitis C Screening  Completed    HIB Vaccine  Aged Out    Hepatitis B Vaccine  Aged Out    IPV Vaccine  Aged Out    Hepatitis A Vaccine  Aged Out    Meningococcal ACWY Vaccine  Aged Out    HPV Vaccine  Aged Out       Immunization History   Administered Date(s) Administered    Influenza Quadrivalent Preservative Free 3 years and older IM 09/22/2016       Bulmaro Montoya MD

## 2021-09-21 NOTE — ASSESSMENT & PLAN NOTE
Hemoglobin A1c 6 3%    We discussed trial of metformin, patient is reluctant to start any medications    He is determined to continue with weight loss exercise, he will repeat blood work in 3 months

## 2021-09-27 PROBLEM — K80.20 ASYMPTOMATIC GALLSTONES: Status: ACTIVE | Noted: 2021-09-27

## 2021-09-27 PROBLEM — E78.2 MIXED HYPERLIPIDEMIA: Status: ACTIVE | Noted: 2021-09-27

## 2021-09-27 PROBLEM — E87.6 HYPOKALEMIA: Status: RESOLVED | Noted: 2021-07-31 | Resolved: 2021-09-27

## 2021-09-27 PROBLEM — F10.21 ALCOHOL USE DISORDER, MODERATE, IN EARLY REMISSION (HCC): Status: ACTIVE | Noted: 2021-09-27

## 2021-09-27 NOTE — ASSESSMENT & PLAN NOTE
Patient has been sober for nearly 2 months  He denies any cravings      He is determined to abstain from alcohol

## 2021-09-27 NOTE — ASSESSMENT & PLAN NOTE
LFTs have been improving      Continue with lifestyle changes, weight loss, exercise, alcohol abstinence

## 2021-09-27 NOTE — ASSESSMENT & PLAN NOTE
High cholesterol and triglycerides  Family history of hyperlipidemia:  Mother  Patient prefers to hold of medications and would like to continue with lifestyle changes, exercise and weight loss    Reassess in 3 months

## 2021-10-04 ENCOUNTER — PATIENT OUTREACH (OUTPATIENT)
Dept: FAMILY MEDICINE CLINIC | Facility: CLINIC | Age: 46
End: 2021-10-04

## 2021-10-11 ENCOUNTER — PATIENT OUTREACH (OUTPATIENT)
Dept: FAMILY MEDICINE CLINIC | Facility: CLINIC | Age: 46
End: 2021-10-11

## 2021-10-12 ENCOUNTER — PATIENT OUTREACH (OUTPATIENT)
Dept: FAMILY MEDICINE CLINIC | Facility: CLINIC | Age: 46
End: 2021-10-12

## 2021-12-28 ENCOUNTER — OFFICE VISIT (OUTPATIENT)
Dept: FAMILY MEDICINE CLINIC | Facility: CLINIC | Age: 46
End: 2021-12-28

## 2021-12-28 VITALS
TEMPERATURE: 98 F | RESPIRATION RATE: 16 BRPM | OXYGEN SATURATION: 97 % | SYSTOLIC BLOOD PRESSURE: 134 MMHG | DIASTOLIC BLOOD PRESSURE: 72 MMHG | WEIGHT: 187 LBS | HEART RATE: 93 BPM | BODY MASS INDEX: 31.16 KG/M2 | HEIGHT: 65 IN

## 2021-12-28 DIAGNOSIS — E11.9 TYPE 2 DIABETES MELLITUS WITHOUT COMPLICATION, WITHOUT LONG-TERM CURRENT USE OF INSULIN (HCC): Primary | ICD-10-CM

## 2021-12-28 DIAGNOSIS — E78.2 MIXED HYPERLIPIDEMIA: ICD-10-CM

## 2021-12-28 DIAGNOSIS — F10.21 ALCOHOL USE DISORDER, MODERATE, IN EARLY REMISSION (HCC): ICD-10-CM

## 2021-12-28 DIAGNOSIS — K76.0 FATTY LIVER: Chronic | ICD-10-CM

## 2021-12-28 DIAGNOSIS — K80.20 ASYMPTOMATIC GALLSTONES: ICD-10-CM

## 2021-12-28 PROCEDURE — 99214 OFFICE O/P EST MOD 30 MIN: CPT | Performed by: FAMILY MEDICINE

## 2021-12-28 RX ORDER — METFORMIN HYDROCHLORIDE 500 MG/1
TABLET, EXTENDED RELEASE ORAL
Qty: 90 TABLET | Refills: 5 | Status: SHIPPED | OUTPATIENT
Start: 2021-12-28

## 2021-12-28 NOTE — Clinical Note
Good morning, I would appreciate if you could follow up with patient regarding his medical assistant/insurance application  He was recently diagnosed with type 2 diabetes    Thank you

## 2021-12-28 NOTE — PROGRESS NOTES
FAMILY PRACTICE OFFICE VISIT       NAME: Debora Dunne  AGE: 51 y o  SEX: male       : 1975        MRN: [id number]        Assessment and Plan     1  Type 2 diabetes mellitus without complication, without long-term current use of insulin [facility])  Assessment & Plan:    Lab Results   Component Value Date    HGBA1C 6 7 2021     Long discussion with patient regarding low-carbohydrate diet  Start metformin ER, titrate from 500 mg daily up to 1500 mg daily  Check urine microalbumin with next blood draw  I advised patient to schedule diabetic eye exam   Fasting blood glucose on  was 179  Hemoglobin A1c went up from 6 33 months ago up to 6 7 at present time  Orders:  -     metFORMIN (GLUCOPHAGE-XR) 500 mg 24 hr tablet; 1 tablet at dinner time for 7 days, then increase dose to 2 tablets at dinner time for 7 days, then increase dose to 3 tablets once a day at dinner time  -     Comprehensive metabolic panel; Future  -     Hemoglobin A1C; Future  -     Microalbumin / creatinine urine ratio; Future    2  Asymptomatic gallstones  Assessment & Plan:  Recent blood work indicates essentially normal AST and ALT with persistent elevation of alkaline phosphatase at 205  Patient denies symptoms of abdominal pain or dyspepsia  Red flags reviewed  3  Mixed hyperlipidemia  Assessment & Plan:  Patient is reluctant to start anti-lipid medications  Cholesterol has improved and went down from 284 to 257  Triglycerides remain slightly elevated to 38    Continue low-fat low-cholesterol diet, discussed      Orders:  -     Lipid Panel with Direct LDL reflex; Future    4  Alcohol use disorder, moderate, in early remission [facility])  Assessment & Plan:  Patient is committed to sobriety      5  Fatty liver    Patient presents for follow-up of chronic medical conditions  Assessment and plan as outlined above  Follow-up and blood work in 3 months    I will contact our , patient still has no medical insurance, reportedly application was completed  He is now being diagnosed with type 2 diabetes and should continue close follow-up for medical care  I strongly advised patient to quit tobacco and proceed with COVID-19 vaccination  There are no Patient Instructions on file for this visit  Return in about 3 months (around 4/6/2022)  Discussed with the patient and all questioned fully answered  He will call me if any problems arise  M*Modal software was used to dictate this note  It may contain errors with dictating incorrect words/spelling  Please contact provider directly with any questions  Chief Complaint     Chief Complaint   Patient presents with    Follow-up     pt is here for 3 mos f/u       History of Present Illness     Patient presents for follow-up of chronic medical conditions  He has been abstaining from alcohol aside from social drink (beer) on Destiny Day  He denies any EtOH cravings  Results of recent blood work performed on December 22nd reviewed  Fasting blood glucose is 179, hemoglobin A1c 6 7  AST is minimally elevated at 44, ALT is normal   Alkaline phosphatase is stably elevated at 205  Cholesterol level has improved and went down from 284 to 257  Triglycerides are still elevated at 238, LDL is 156  TSH is normal     FHX-  DM2 - paternal GM     RUQ 9/2021  Hepatomegaly  Hepatic moderate steatosis  Cholelithiasis    Patient has been walking 4 to 5 km per day  He admits enjoying his take, not a big fan of fish and chicken breast   We reviewed low-cholesterol and low-carbohydrate diet  Patient has been essentially alcohol free  He is still smoking 1 pack daily and is motivated to quit  Patient does not vaccinated against COVID-19  Review of Systems   Review of Systems   Constitutional: Negative  HENT: Negative  Eyes: Negative  Respiratory: Negative  Cardiovascular: Negative  Gastrointestinal: Negative  Endocrine: Negative  Genitourinary: Negative  Musculoskeletal: Negative  Skin: Negative  Allergic/Immunologic: Negative  Neurological: Negative  Hematological: Negative  Psychiatric/Behavioral: Negative  Active Problem List     Patient Active Problem List   Diagnosis    Essential hypertension    Alcohol withdrawal seizure ([facility] )    Type 2 diabetes mellitus without complication, without long-term current use of insulin (HCC)    Alcohol use disorder, moderate, in early remission ([facility] )    Asymptomatic gallstones    Mixed hyperlipidemia    Fatty liver       Past Medical History:  Past Medical History:   Diagnosis Date    Acute lymphangitis of left upper limb 11/14/2016    Elevated LFTs 7/31/2021    Hypertension        Past Surgical History:  History reviewed  No pertinent surgical history  Family History:  History reviewed  No pertinent family history      Social History:  Social History     Socioeconomic History    Marital status: /Civil Union     Spouse name: Not on file    Number of children: Not on file    Years of education: Not on file    Highest education level: Not on file   Occupational History    Not on file   Tobacco Use    Smoking status: Heavy Tobacco Smoker     Packs/day: 0 50     Types: Cigarettes    Smokeless tobacco: Never Used   Vaping Use    Vaping Use: Never used   Substance and Sexual Activity    Alcohol use: Not Currently    Drug use: No    Sexual activity: Not on file   Other Topics Concern    Not on file   Social History Narrative    Not on file     Social Determinants of Health     Financial Resource Strain: Not on file   Food Insecurity: Not on file   Transportation Needs: Not on file   Physical Activity: Not on file   Stress: Not on file   Social Connections: Not on file   Intimate Partner Violence: Not on file   Housing Stability: Not on file         Objective     Vitals:    12/28/21 1024 12/28/21 1102   BP: 150/70 134/72   Pulse: 93    Resp: 16    Temp: 98 °F (36 7 °C)    TempSrc: Temporal    SpO2: 97%    Weight: 84 8 kg (187 lb)    Height: 5' 5" (1 651 m)        Wt Readings from Last 3 Encounters:   12/28/21 84 8 kg (187 lb)   09/21/21 87 2 kg (192 lb 4 oz)   08/06/21 84 4 kg (186 lb)       Physical Exam  Vitals and nursing note reviewed  Constitutional:       General: He is not in acute distress  Appearance: Normal appearance  He is well-developed  He is not ill-appearing  HENT:      Head: Normocephalic and atraumatic  Eyes:      Conjunctiva/sclera: Conjunctivae normal    Neck:      Vascular: No carotid bruit  Cardiovascular:      Rate and Rhythm: Normal rate and regular rhythm  Heart sounds: Normal heart sounds  No murmur heard  Pulmonary:      Effort: Pulmonary effort is normal  No respiratory distress  Breath sounds: Normal breath sounds  No wheezing or rales  Abdominal:      General: Bowel sounds are normal  There is no distension or abdominal bruit  Palpations: Abdomen is soft  Tenderness: There is no abdominal tenderness  Musculoskeletal:         General: Normal range of motion  Cervical back: Neck supple  No rigidity  Right lower leg: No edema  Left lower leg: No edema  Skin:     General: Skin is warm  Neurological:      General: No focal deficit present  Mental Status: He is alert and oriented to person, place, and time  Cranial Nerves: No cranial nerve deficit  Psychiatric:         Mood and Affect: Mood normal          Behavior: Behavior normal          Thought Content:  Thought content normal           Pertinent Laboratory/Diagnostic Studies:    Lab Results   Component Value Date    WBC 6 67 07/31/2021    HGB 14 0 07/31/2021    HCT 39 7 07/31/2021     (H) 07/31/2021     07/31/2021       No results found for: TSH    No results found for: CHOL  Lab Results   Component Value Date    TRIG 269 (H) 07/30/2021     Lab Results   Component Value Date    HDL 81 07/30/2021     Lab Results   Component Value Date    LDLCALC 192 (H) 07/30/2021     Lab Results   Component Value Date    HGBA1C 6 7 12/22/2021     Lab Results   Component Value Date    SODIUM 140 08/02/2021    K 3 9 08/02/2021     08/02/2021    CO2 28 08/02/2021    AGAP 10 08/02/2021    BUN 10 08/02/2021    CREATININE 0 78 08/02/2021    GLUC 135 08/02/2021    CALCIUM 10 0 08/02/2021     (H) 08/02/2021     (H) 08/02/2021    ALKPHOS 213 (H) 08/02/2021    TP 7 0 08/02/2021    TBILI 1 06 (H) 08/02/2021    EGFR 107 08/02/2021       Orders Placed This Encounter   Procedures    Comprehensive metabolic panel    Hemoglobin A1C    Lipid Panel with Direct LDL reflex    Microalbumin / creatinine urine ratio       ALLERGIES:  No Known Allergies    Current Medications     Current Outpatient Medications   Medication Sig Dispense Refill    bacitracin ointment Apply topically 2 (two) times a day (Patient not taking: Reported on 7/30/2021)      metFORMIN (GLUCOPHAGE-XR) 500 mg 24 hr tablet 1 tablet at dinner time for 7 days, then increase dose to 2 tablets at dinner time for 7 days, then increase dose to 3 tablets once a day at dinner time 90 tablet 5     No current facility-administered medications for this visit  There are no discontinued medications      Health Maintenance     Health Maintenance   Topic Date Due    Hepatitis A Vaccine (1 of 2 - Risk 2-dose series) Never done    COVID-19 Vaccine (1) Never done    Pneumococcal Vaccine: Pediatrics (0 to 5 Years) and At-Risk Patients (6 to 59 Years) (1 of 2 - PPSV23) Never done    Diabetic Foot Exam  Never done    DM Eye Exam  Never done    URINE MICROALBUMIN  Never done    HIV Screening  Never done    Annual Physical  Never done    Hepatitis B Vaccine (1 of 3 - Risk 3-dose series) Never done    DTaP,Tdap,and Td Vaccines (1 - Tdap) Never done    Influenza Vaccine (1) 06/30/2022 (Originally 9/1/2021)    HEMOGLOBIN A1C  06/22/2022    Depression Screening 08/06/2022    BMI: Followup Plan  08/11/2022    BMI: Adult  12/28/2022    Hepatitis C Screening  Completed    HIB Vaccine  Aged Out    IPV Vaccine  Aged Out    Meningococcal ACWY Vaccine  Aged Out    HPV Vaccine  Aged Out       Immunization History   Administered Date(s) Administered    Influenza Quadrivalent Preservative Free 3 years and older IM 09/22/2016       Debbie Penn MD

## 2022-01-03 PROBLEM — R79.89 ELEVATED LFTS: Chronic | Status: RESOLVED | Noted: 2021-07-31 | Resolved: 2022-01-03

## 2022-01-03 PROBLEM — K76.0 FATTY LIVER: Chronic | Status: ACTIVE | Noted: 2022-01-03

## 2022-01-03 NOTE — ASSESSMENT & PLAN NOTE
Recent blood work indicates essentially normal AST and ALT with persistent elevation of alkaline phosphatase at 205  Patient denies symptoms of abdominal pain or dyspepsia  Red flags reviewed

## 2022-01-03 NOTE — ASSESSMENT & PLAN NOTE
Lab Results   Component Value Date    HGBA1C 6 7 12/22/2021     Long discussion with patient regarding low-carbohydrate diet  Start metformin ER, titrate from 500 mg daily up to 1500 mg daily  Check urine microalbumin with next blood draw  I advised patient to schedule diabetic eye exam   Fasting blood glucose on December 22nd was 179  Hemoglobin A1c went up from 6 33 months ago up to 6 7 at present time

## 2022-01-03 NOTE — ASSESSMENT & PLAN NOTE
Patient is reluctant to start anti-lipid medications  Cholesterol has improved and went down from 284 to 257  Triglycerides remain slightly elevated to 38      Continue low-fat low-cholesterol diet, discussed

## 2022-01-04 ENCOUNTER — PATIENT OUTREACH (OUTPATIENT)
Dept: FAMILY MEDICINE CLINIC | Facility: CLINIC | Age: 47
End: 2022-01-04

## 2022-01-04 NOTE — PROGRESS NOTES
Request received from PCP for OP SWCM to assist patient in checking stating of his Medical Assistance Application  Patient has recent diagnosis of Type 2 Diabetes  Chart reviewed  OP SWCM spoke with patient in the past   Patient was working with [employer] to complete MA application and submit requested income documents  Spoke with Bryon Felix through [facility] who stated patient submitted needed documents but the [facility] denied patient for MA due to exceeding income limits  PATHS closed patient's case on 12/2/2021  Bryon Felix stated patient would receive a letter from the [facility] with this determination as well  Call placed to patient to inform  Patient understanding  Referred patient to [facility] to inquire about alternate insurance coverage options (provided phone # and website information)  Patient will outreach [facility] to discuss insurance coverage plans  Patient worried about outstanding bills through [facility]  Will refer to Financial Counselors and request they assist patient with Financial Assistance/[employer] application  Patient aware and agreeable to this  No other needs  Will route update to PCP

## 2022-02-01 ENCOUNTER — TELEPHONE (OUTPATIENT)
Dept: FAMILY MEDICINE CLINIC | Facility: CLINIC | Age: 47
End: 2022-02-01

## 2022-02-01 NOTE — TELEPHONE ENCOUNTER
Patient called to f/u since starting Metformin, as of 12/2021  Patent said he has been experiencing diarrhea daily since taking Metformin, so he stopped taking it 2 days ago  He would like to know if there is another medication he can try for his dm2

## 2022-02-02 NOTE — TELEPHONE ENCOUNTER
Spoke with pt, MD instructions given  Pt verbalizes understanding and will call back with any concerns

## 2023-06-02 ENCOUNTER — HOSPITAL ENCOUNTER (INPATIENT)
Facility: HOSPITAL | Age: 48
LOS: 3 days | Discharge: HOME | DRG: 045 | End: 2023-06-06
Attending: EMERGENCY MEDICINE | Admitting: INTERNAL MEDICINE
Payer: COMMERCIAL

## 2023-06-02 ENCOUNTER — APPOINTMENT (EMERGENCY)
Dept: RADIOLOGY | Facility: HOSPITAL | Age: 48
DRG: 045 | End: 2023-06-02
Payer: COMMERCIAL

## 2023-06-02 DIAGNOSIS — E11.9 TYPE 2 DIABETES MELLITUS WITHOUT COMPLICATION, WITHOUT LONG-TERM CURRENT USE OF INSULIN (HCC): ICD-10-CM

## 2023-06-02 DIAGNOSIS — Z72.0 TOBACCO USE: ICD-10-CM

## 2023-06-02 DIAGNOSIS — I63.50 RIGHT PONTINE STROKE (HCC): ICD-10-CM

## 2023-06-02 DIAGNOSIS — R47.9 SPEECH DISTURBANCE: Primary | ICD-10-CM

## 2023-06-02 DIAGNOSIS — F10.90 ALCOHOL USE DISORDER: ICD-10-CM

## 2023-06-02 DIAGNOSIS — R29.90 STROKE-LIKE SYMPTOMS: ICD-10-CM

## 2023-06-02 DIAGNOSIS — R29.810 FACIAL DROOP: ICD-10-CM

## 2023-06-02 LAB
ALBUMIN SERPL BCP-MCNC: 4 G/DL (ref 3.5–5)
ALP SERPL-CCNC: 170 U/L (ref 46–116)
ALT SERPL W P-5'-P-CCNC: 150 U/L (ref 12–78)
ANION GAP SERPL CALCULATED.3IONS-SCNC: 8 MMOL/L (ref 4–13)
APTT PPP: 29 SECONDS (ref 23–37)
AST SERPL W P-5'-P-CCNC: 141 U/L (ref 5–45)
BASOPHILS # BLD AUTO: 0.06 THOUSANDS/ÂΜL (ref 0–0.1)
BASOPHILS NFR BLD AUTO: 1 % (ref 0–1)
BILIRUB SERPL-MCNC: 0.74 MG/DL (ref 0.2–1)
BUN SERPL-MCNC: 20 MG/DL (ref 5–25)
CALCIUM SERPL-MCNC: 10 MG/DL (ref 8.3–10.1)
CARDIAC TROPONIN I PNL SERPL HS: 8 NG/L
CHLORIDE SERPL-SCNC: 103 MMOL/L (ref 96–108)
CO2 SERPL-SCNC: 24 MMOL/L (ref 21–32)
CREAT SERPL-MCNC: 0.96 MG/DL (ref 0.6–1.3)
EOSINOPHIL # BLD AUTO: 0.1 THOUSAND/ÂΜL (ref 0–0.61)
EOSINOPHIL NFR BLD AUTO: 1 % (ref 0–6)
ERYTHROCYTE [DISTWIDTH] IN BLOOD BY AUTOMATED COUNT: 14 % (ref 11.6–15.1)
GFR SERPL CREATININE-BSD FRML MDRD: 92 ML/MIN/1.73SQ M
GLUCOSE SERPL-MCNC: 246 MG/DL (ref 65–140)
HCT VFR BLD AUTO: 43.5 % (ref 36.5–49.3)
HGB BLD-MCNC: 16 G/DL (ref 12–17)
IMM GRANULOCYTES # BLD AUTO: 0.05 THOUSAND/UL (ref 0–0.2)
IMM GRANULOCYTES NFR BLD AUTO: 1 % (ref 0–2)
INR PPP: 0.96 (ref 0.84–1.19)
LYMPHOCYTES # BLD AUTO: 2.12 THOUSANDS/ÂΜL (ref 0.6–4.47)
LYMPHOCYTES NFR BLD AUTO: 26 % (ref 14–44)
MAGNESIUM SERPL-MCNC: 2.3 MG/DL (ref 1.6–2.6)
MCH RBC QN AUTO: 35.6 PG (ref 26.8–34.3)
MCHC RBC AUTO-ENTMCNC: 36.8 G/DL (ref 31.4–37.4)
MCV RBC AUTO: 97 FL (ref 82–98)
MONOCYTES # BLD AUTO: 0.83 THOUSAND/ÂΜL (ref 0.17–1.22)
MONOCYTES NFR BLD AUTO: 10 % (ref 4–12)
NEUTROPHILS # BLD AUTO: 5.07 THOUSANDS/ÂΜL (ref 1.85–7.62)
NEUTS SEG NFR BLD AUTO: 61 % (ref 43–75)
NRBC BLD AUTO-RTO: 0 /100 WBCS
PHOSPHATE SERPL-MCNC: 3 MG/DL (ref 2.7–4.5)
PLATELET # BLD AUTO: 183 THOUSANDS/UL (ref 149–390)
PMV BLD AUTO: 9.8 FL (ref 8.9–12.7)
POTASSIUM SERPL-SCNC: 4.1 MMOL/L (ref 3.5–5.3)
PROT SERPL-MCNC: 8 G/DL (ref 6.4–8.4)
PROTHROMBIN TIME: 13 SECONDS (ref 11.6–14.5)
RBC # BLD AUTO: 4.49 MILLION/UL (ref 3.88–5.62)
SODIUM SERPL-SCNC: 135 MMOL/L (ref 135–147)
TSH SERPL DL<=0.05 MIU/L-ACNC: 1.66 UIU/ML (ref 0.45–4.5)
WBC # BLD AUTO: 8.23 THOUSAND/UL (ref 4.31–10.16)

## 2023-06-02 PROCEDURE — 80053 COMPREHEN METABOLIC PANEL: CPT

## 2023-06-02 PROCEDURE — 93005 ELECTROCARDIOGRAM TRACING: CPT

## 2023-06-02 PROCEDURE — 70498 CT ANGIOGRAPHY NECK: CPT

## 2023-06-02 PROCEDURE — 36415 COLL VENOUS BLD VENIPUNCTURE: CPT

## 2023-06-02 PROCEDURE — 70496 CT ANGIOGRAPHY HEAD: CPT

## 2023-06-02 PROCEDURE — 84100 ASSAY OF PHOSPHORUS: CPT

## 2023-06-02 PROCEDURE — 85025 COMPLETE CBC W/AUTO DIFF WBC: CPT

## 2023-06-02 PROCEDURE — 85610 PROTHROMBIN TIME: CPT

## 2023-06-02 PROCEDURE — 83036 HEMOGLOBIN GLYCOSYLATED A1C: CPT

## 2023-06-02 PROCEDURE — 96365 THER/PROPH/DIAG IV INF INIT: CPT

## 2023-06-02 PROCEDURE — 84443 ASSAY THYROID STIM HORMONE: CPT

## 2023-06-02 PROCEDURE — G1004 CDSM NDSC: HCPCS

## 2023-06-02 PROCEDURE — 99285 EMERGENCY DEPT VISIT HI MDM: CPT

## 2023-06-02 PROCEDURE — 85730 THROMBOPLASTIN TIME PARTIAL: CPT

## 2023-06-02 PROCEDURE — 83735 ASSAY OF MAGNESIUM: CPT

## 2023-06-02 PROCEDURE — 84484 ASSAY OF TROPONIN QUANT: CPT

## 2023-06-02 PROCEDURE — 96367 TX/PROPH/DG ADDL SEQ IV INF: CPT

## 2023-06-02 RX ADMIN — FOLIC ACID 1 MG: 5 INJECTION, SOLUTION INTRAMUSCULAR; INTRAVENOUS; SUBCUTANEOUS at 21:44

## 2023-06-02 RX ADMIN — IOHEXOL 85 ML: 350 INJECTION, SOLUTION INTRAVENOUS at 22:09

## 2023-06-02 RX ADMIN — THIAMINE HYDROCHLORIDE 100 MG: 100 INJECTION, SOLUTION INTRAMUSCULAR; INTRAVENOUS at 22:30

## 2023-06-02 RX ADMIN — Medication 1 TABLET: at 21:44

## 2023-06-03 ENCOUNTER — APPOINTMENT (OUTPATIENT)
Dept: RADIOLOGY | Facility: HOSPITAL | Age: 48
DRG: 045 | End: 2023-06-03
Payer: COMMERCIAL

## 2023-06-03 PROBLEM — Z72.0 TOBACCO USE: Status: ACTIVE | Noted: 2023-06-03

## 2023-06-03 PROBLEM — F10.90 ALCOHOL USE DISORDER: Status: ACTIVE | Noted: 2023-06-03

## 2023-06-03 PROBLEM — R29.90 STROKE-LIKE SYMPTOMS: Status: ACTIVE | Noted: 2023-06-03

## 2023-06-03 PROBLEM — I63.50 RIGHT PONTINE STROKE (HCC): Status: ACTIVE | Noted: 2023-06-03

## 2023-06-03 LAB
2HR DELTA HS TROPONIN: 1 NG/L
4HR DELTA HS TROPONIN: 1 NG/L
ALBUMIN SERPL BCP-MCNC: 3.5 G/DL (ref 3.5–5)
ALP SERPL-CCNC: 151 U/L (ref 46–116)
ALT SERPL W P-5'-P-CCNC: 133 U/L (ref 12–78)
ANION GAP SERPL CALCULATED.3IONS-SCNC: 4 MMOL/L (ref 4–13)
AST SERPL W P-5'-P-CCNC: 106 U/L (ref 5–45)
ATRIAL RATE: 99 BPM
BILIRUB SERPL-MCNC: 0.74 MG/DL (ref 0.2–1)
BILIRUB UR QL STRIP: NEGATIVE
BUN SERPL-MCNC: 16 MG/DL (ref 5–25)
CALCIUM SERPL-MCNC: 9.6 MG/DL (ref 8.3–10.1)
CARDIAC TROPONIN I PNL SERPL HS: 9 NG/L
CARDIAC TROPONIN I PNL SERPL HS: 9 NG/L
CHLORIDE SERPL-SCNC: 105 MMOL/L (ref 96–108)
CHOLEST SERPL-MCNC: 211 MG/DL
CLARITY UR: CLEAR
CO2 SERPL-SCNC: 25 MMOL/L (ref 21–32)
COLOR UR: YELLOW
CREAT SERPL-MCNC: 0.88 MG/DL (ref 0.6–1.3)
ERYTHROCYTE [DISTWIDTH] IN BLOOD BY AUTOMATED COUNT: 13.9 % (ref 11.6–15.1)
EST. AVERAGE GLUCOSE BLD GHB EST-MCNC: 151 MG/DL
GFR SERPL CREATININE-BSD FRML MDRD: 100 ML/MIN/1.73SQ M
GLUCOSE SERPL-MCNC: 135 MG/DL (ref 65–140)
GLUCOSE SERPL-MCNC: 184 MG/DL (ref 65–140)
GLUCOSE SERPL-MCNC: 199 MG/DL (ref 65–140)
GLUCOSE SERPL-MCNC: 268 MG/DL (ref 65–140)
GLUCOSE UR STRIP-MCNC: NEGATIVE MG/DL
HBA1C MFR BLD: 6.9 %
HCT VFR BLD AUTO: 41.2 % (ref 36.5–49.3)
HDLC SERPL-MCNC: 80 MG/DL
HGB BLD-MCNC: 14.7 G/DL (ref 12–17)
HGB UR QL STRIP.AUTO: NEGATIVE
KETONES UR STRIP-MCNC: NEGATIVE MG/DL
LDLC SERPL CALC-MCNC: 88 MG/DL (ref 0–100)
LEUKOCYTE ESTERASE UR QL STRIP: NEGATIVE
MCH RBC QN AUTO: 34.7 PG (ref 26.8–34.3)
MCHC RBC AUTO-ENTMCNC: 35.7 G/DL (ref 31.4–37.4)
MCV RBC AUTO: 97 FL (ref 82–98)
NITRITE UR QL STRIP: NEGATIVE
P AXIS: 64 DEGREES
PH UR STRIP.AUTO: 5.5 [PH] (ref 4.5–8)
PLATELET # BLD AUTO: 165 THOUSANDS/UL (ref 149–390)
PMV BLD AUTO: 10 FL (ref 8.9–12.7)
POTASSIUM SERPL-SCNC: 4.1 MMOL/L (ref 3.5–5.3)
PR INTERVAL: 144 MS
PROT SERPL-MCNC: 7.2 G/DL (ref 6.4–8.4)
PROT UR STRIP-MCNC: NEGATIVE MG/DL
QRS AXIS: 58 DEGREES
QRSD INTERVAL: 80 MS
QT INTERVAL: 342 MS
QTC INTERVAL: 438 MS
RBC # BLD AUTO: 4.24 MILLION/UL (ref 3.88–5.62)
SODIUM SERPL-SCNC: 134 MMOL/L (ref 135–147)
SP GR UR STRIP.AUTO: 1.01 (ref 1–1.03)
T WAVE AXIS: 63 DEGREES
TRIGL SERPL-MCNC: 216 MG/DL
UROBILINOGEN UR QL STRIP.AUTO: 0.2 E.U./DL
VENTRICULAR RATE: 99 BPM
WBC # BLD AUTO: 6.76 THOUSAND/UL (ref 4.31–10.16)

## 2023-06-03 PROCEDURE — 70551 MRI BRAIN STEM W/O DYE: CPT

## 2023-06-03 PROCEDURE — 93010 ELECTROCARDIOGRAM REPORT: CPT | Performed by: INTERNAL MEDICINE

## 2023-06-03 PROCEDURE — 80061 LIPID PANEL: CPT

## 2023-06-03 PROCEDURE — 80053 COMPREHEN METABOLIC PANEL: CPT

## 2023-06-03 PROCEDURE — 99223 1ST HOSP IP/OBS HIGH 75: CPT | Performed by: INTERNAL MEDICINE

## 2023-06-03 PROCEDURE — 81003 URINALYSIS AUTO W/O SCOPE: CPT

## 2023-06-03 PROCEDURE — 99254 IP/OBS CNSLTJ NEW/EST MOD 60: CPT | Performed by: PSYCHIATRY & NEUROLOGY

## 2023-06-03 PROCEDURE — 92610 EVALUATE SWALLOWING FUNCTION: CPT

## 2023-06-03 PROCEDURE — 36415 COLL VENOUS BLD VENIPUNCTURE: CPT

## 2023-06-03 PROCEDURE — 85027 COMPLETE CBC AUTOMATED: CPT

## 2023-06-03 PROCEDURE — 84484 ASSAY OF TROPONIN QUANT: CPT

## 2023-06-03 PROCEDURE — 99024 POSTOP FOLLOW-UP VISIT: CPT | Performed by: NURSE PRACTITIONER

## 2023-06-03 PROCEDURE — 82948 REAGENT STRIP/BLOOD GLUCOSE: CPT

## 2023-06-03 RX ORDER — LANOLIN ALCOHOL/MO/W.PET/CERES
100 CREAM (GRAM) TOPICAL DAILY
Status: DISCONTINUED | OUTPATIENT
Start: 2023-06-03 | End: 2023-06-06 | Stop reason: HOSPADM

## 2023-06-03 RX ORDER — HEPARIN SODIUM 5000 [USP'U]/ML
5000 INJECTION, SOLUTION INTRAVENOUS; SUBCUTANEOUS EVERY 8 HOURS SCHEDULED
Status: DISCONTINUED | OUTPATIENT
Start: 2023-06-03 | End: 2023-06-06 | Stop reason: HOSPADM

## 2023-06-03 RX ORDER — ASPIRIN 325 MG
325 TABLET ORAL ONCE
Status: COMPLETED | OUTPATIENT
Start: 2023-06-03 | End: 2023-06-03

## 2023-06-03 RX ORDER — NICOTINE 21 MG/24HR
14 PATCH, TRANSDERMAL 24 HOURS TRANSDERMAL DAILY
Status: DISCONTINUED | OUTPATIENT
Start: 2023-06-03 | End: 2023-06-06 | Stop reason: HOSPADM

## 2023-06-03 RX ORDER — ASPIRIN 81 MG/1
81 TABLET, CHEWABLE ORAL DAILY
Status: DISCONTINUED | OUTPATIENT
Start: 2023-06-03 | End: 2023-06-06 | Stop reason: HOSPADM

## 2023-06-03 RX ORDER — FOLIC ACID 1 MG/1
1 TABLET ORAL DAILY
Status: DISCONTINUED | OUTPATIENT
Start: 2023-06-03 | End: 2023-06-06 | Stop reason: HOSPADM

## 2023-06-03 RX ORDER — INSULIN LISPRO 100 [IU]/ML
1-6 INJECTION, SOLUTION INTRAVENOUS; SUBCUTANEOUS
Status: DISCONTINUED | OUTPATIENT
Start: 2023-06-03 | End: 2023-06-06 | Stop reason: HOSPADM

## 2023-06-03 RX ORDER — INSULIN LISPRO 100 [IU]/ML
1-5 INJECTION, SOLUTION INTRAVENOUS; SUBCUTANEOUS
Status: DISCONTINUED | OUTPATIENT
Start: 2023-06-03 | End: 2023-06-06 | Stop reason: HOSPADM

## 2023-06-03 RX ORDER — METHOCARBAMOL 500 MG/1
500 TABLET, FILM COATED ORAL EVERY 6 HOURS PRN
Status: DISCONTINUED | OUTPATIENT
Start: 2023-06-03 | End: 2023-06-06 | Stop reason: HOSPADM

## 2023-06-03 RX ORDER — CLOPIDOGREL BISULFATE 75 MG/1
75 TABLET ORAL DAILY
Status: DISCONTINUED | OUTPATIENT
Start: 2023-06-03 | End: 2023-06-06 | Stop reason: HOSPADM

## 2023-06-03 RX ORDER — ATORVASTATIN CALCIUM 40 MG/1
40 TABLET, FILM COATED ORAL EVERY EVENING
Status: DISCONTINUED | OUTPATIENT
Start: 2023-06-03 | End: 2023-06-03

## 2023-06-03 RX ADMIN — HEPARIN SODIUM 5000 UNITS: 5000 INJECTION INTRAVENOUS; SUBCUTANEOUS at 21:56

## 2023-06-03 RX ADMIN — METHOCARBAMOL TABLETS 500 MG: 500 TABLET, COATED ORAL at 13:22

## 2023-06-03 RX ADMIN — Medication 1 TABLET: at 08:23

## 2023-06-03 RX ADMIN — THIAMINE HCL TAB 100 MG 100 MG: 100 TAB at 08:23

## 2023-06-03 RX ADMIN — CLOPIDOGREL BISULFATE 75 MG: 75 TABLET ORAL at 11:08

## 2023-06-03 RX ADMIN — HEPARIN SODIUM 5000 UNITS: 5000 INJECTION INTRAVENOUS; SUBCUTANEOUS at 13:22

## 2023-06-03 RX ADMIN — FOLIC ACID 1 MG: 1 TABLET ORAL at 08:23

## 2023-06-03 RX ADMIN — INSULIN LISPRO 1 UNITS: 100 INJECTION, SOLUTION INTRAVENOUS; SUBCUTANEOUS at 23:12

## 2023-06-03 RX ADMIN — ASPIRIN 81 MG CHEWABLE TABLET 81 MG: 81 TABLET CHEWABLE at 08:23

## 2023-06-03 RX ADMIN — METHOCARBAMOL TABLETS 500 MG: 500 TABLET, COATED ORAL at 21:56

## 2023-06-03 RX ADMIN — INSULIN LISPRO 1 UNITS: 100 INJECTION, SOLUTION INTRAVENOUS; SUBCUTANEOUS at 16:44

## 2023-06-03 RX ADMIN — ASPIRIN 325 MG ORAL TABLET 325 MG: 325 PILL ORAL at 04:15

## 2023-06-03 RX ADMIN — NICOTINE 14 MG: 14 PATCH, EXTENDED RELEASE TRANSDERMAL at 08:22

## 2023-06-03 NOTE — ASSESSMENT & PLAN NOTE
Appears chronic upon chart review, likely in the setting of ETOH use  · RUQ US in 2021 showed hepatomegaly and moderate steatosis     · Can trend PRN

## 2023-06-03 NOTE — H&P
"[address] [facility]  H&P  Name: Hugo Sun 51 y o  male I MRN: [id number]  Unit/Bed#: ED 13 I Date of Admission: 6/2/2023   Date of Service: 6/3/2023 I Hospital Day: 0      Assessment/Plan   * Stroke-like symptoms  Assessment & Plan  · Reports to the ED with slurred speech, unsteady gait and L facial droop x1 day; initial NIHSS 2  · CTA head/neck negative  · Neuro consulted  · Stroke pathway  ·  mg Oral now   · ASA 81 mg oral daily   · Lipitor 40mg oral daily   · MRI and Echo pending      Alcohol use disorder  Assessment & Plan  · Last drink x10 days ago; recommend cessation  · Start folic acid, thiamine and MV supplementation  · CIWA protocol    Mixed hyperlipidemia  Assessment & Plan  · Of note, has declined cholesterol lowering medications in the past  · Statin ordered per stroke protocol  · Lipid panel pending    Elevated LFTs  Assessment & Plan  · Likely in the setting of ETOH use; recommend lifestyle modification  · Trend     Type 2 diabetes mellitus without complication, without long-term current use of insulin (HCC)  Assessment & Plan  Lab Results   Component Value Date    HGBA1C 6 7 12/22/2021       No results for input(s): \"POCGLU\" in the last 72 hours  Blood Sugar Average: Last 72 hrs:     · Takes metformin as an outpatient; switched to SSI while inpatient  · Diabetic diet  · Hypoglycemia protocol  · A1C pending          VTE Pharmacologic Prophylaxis: VTE Score: 9 High Risk (Score >/= 5) - Pharmacological DVT Prophylaxis Ordered: heparin  Sequential Compression Devices Ordered  Code Status: Level 1 - Full Code   Discussion with family: Updated  (wife) at bedside  Anticipated Length of Stay: Patient will be admitted on an inpatient basis with an anticipated length of stay of greater than 2 midnights secondary to Stroke workup       Total Time Spent on Date of Encounter in care of patient: 65 minutes This time was spent on one or more of the " following: performing physical exam; counseling and coordination of care; obtaining or reviewing history; documenting in the medical record; reviewing/ordering tests, medications or procedures; communicating with other healthcare professionals and discussing with patient's family/caregivers  Chief Complaint: Stroke like symptoms     History of Present Illness:  Pooja Salas is a 51 y o  male with a PMH of type 2 diabetes, hypertension, alcohol abuse who presents with left-sided weakness, left-sided facial droop, slurred speech over the past 24 hours  The patient states around 5 PM yesterday he began to have slurred speech and a facial droop which was noticed by his family members  As well, he felt as if he was going to fall to his left side when he was walking at home  He states that he felt weak on his left side and felt like he was dragging his leg  He states that he went to work however and when he returned from work he had even more difficulty speaking  Eventually, he was convinced by family to come to the hospital   Of note, he has been a heavy drinker over the past several years however, he did stop 1 week ago and reports that he has not had a drink since  He states in the past, he has had withdrawal seizures however, he states that following cessation this week he had some shakiness but no other withdrawal symptoms  No headaches, vision changes, hearing changes  No numbness  No recent fevers  CTA of head was negative in ED  Patient admitted for MRI, stroke pathway work-up  Review of Systems:  Review of Systems   Constitutional: Negative for chills, fatigue and fever  HENT: Negative for ear pain and sore throat  Eyes: Negative for pain and visual disturbance  Respiratory: Negative for cough and shortness of breath  Cardiovascular: Negative for chest pain and palpitations  Gastrointestinal: Negative for abdominal pain and vomiting     Genitourinary: Negative for dysuria and "hematuria  Musculoskeletal: Negative for arthralgias and back pain  Skin: Negative for color change and rash  Neurological: Positive for facial asymmetry, speech difficulty and weakness  Negative for dizziness, seizures, syncope, light-headedness, numbness and headaches  Psychiatric/Behavioral: Negative for agitation and confusion  All other systems reviewed and are negative  Past Medical and Surgical History:   Past Medical History:   Diagnosis Date   • Acute lymphangitis of left upper limb 11/14/2016   • Elevated LFTs 7/31/2021   • Hypertension        History reviewed  No pertinent surgical history  Meds/Allergies:  Prior to Admission medications    Medication Sig Start Date End Date Taking? Authorizing Provider   bacitracin ointment Apply topically 2 (two) times a day  Patient not taking: Reported on 7/30/2021    Historical Provider, MD   metFORMIN (GLUCOPHAGE-XR) 500 mg 24 hr tablet 1 tablet at dinner time for 7 days, then increase dose to 2 tablets at dinner time for 7 days, then increase dose to 3 tablets once a day at dinner time 12/28/21   Shadi Maria MD     I have reviewed home medications with patient personally  Allergies: No Known Allergies    Social History:  Marital Status: /Civil Union   Occupation: Manual labor, \"works with pipes\"   Patient Pre-hospital Living Situation: Home  Patient Pre-hospital Level of Mobility: walks  Patient Pre-hospital Diet Restrictions: none   Substance Use History:   Social History     Substance and Sexual Activity   Alcohol Use Not Currently     Social History     Tobacco Use   Smoking Status Heavy Smoker   • Packs/day: 0 50   • Types: Cigarettes   Smokeless Tobacco Never     Social History     Substance and Sexual Activity   Drug Use No       Family History:  History reviewed  No pertinent family history      Physical Exam:     Vitals:   Blood Pressure: 137/77 (06/02/23 2230)  Pulse: 74 (06/02/23 2230)  Temperature: 99 6 °F (37 6 °C) " (06/02/23 2104)  Temp Source: Tympanic (06/02/23 2104)  Respirations: 15 (06/02/23 2230)  SpO2: 96 % (06/02/23 2230)    Physical Exam  Vitals and nursing note reviewed  Constitutional:       General: He is not in acute distress  Appearance: Normal appearance  He is well-developed  He is not ill-appearing  HENT:      Head: Normocephalic and atraumatic  Nose: Nose normal  No congestion or rhinorrhea  Mouth/Throat:      Mouth: Mucous membranes are moist       Pharynx: Oropharynx is clear  No oropharyngeal exudate or posterior oropharyngeal erythema  Eyes:      General: No scleral icterus  Right eye: No discharge  Left eye: No discharge  Extraocular Movements: Extraocular movements intact  Conjunctiva/sclera: Conjunctivae normal       Pupils: Pupils are equal, round, and reactive to light  Cardiovascular:      Rate and Rhythm: Normal rate and regular rhythm  Pulses: Normal pulses  Heart sounds: Normal heart sounds  No murmur heard  No friction rub  No gallop  Pulmonary:      Effort: Pulmonary effort is normal  No respiratory distress  Breath sounds: Normal breath sounds  No wheezing, rhonchi or rales  Abdominal:      General: Abdomen is flat  Bowel sounds are normal  There is no distension  Palpations: Abdomen is soft  Tenderness: There is no abdominal tenderness  There is no guarding or rebound  Musculoskeletal:         General: No swelling  Cervical back: Neck supple  Right lower leg: No edema  Left lower leg: No edema  Skin:     General: Skin is warm and dry  Capillary Refill: Capillary refill takes less than 2 seconds  Neurological:      Mental Status: He is alert and oriented to person, place, and time  GCS: GCS eye subscore is 4  GCS verbal subscore is 5  GCS motor subscore is 6  Cranial Nerves: Facial asymmetry present  Motor: Motor function is intact  No weakness or abnormal muscle tone  "Coordination: Coordination is intact  Finger-Nose-Finger Test and Heel to [facility] Test normal       Comments: Slight left-sided facial droop noted on exam \"  Her cranial nerves intact  5 out of 5 strength in all 4 extremities  Sensation intact throughout  Psychiatric:         Mood and Affect: Mood normal          Behavior: Behavior normal           Additional Data:     Lab Results:  Results from last 7 days   Lab Units 06/02/23  2131   EOS PCT % 1   HEMATOCRIT % 43 5   HEMOGLOBIN g/dL 16 0   LYMPHS PCT % 26   MONOS PCT % 10   NEUTROS PCT % 61   PLATELETS Thousands/uL 183   WBC Thousand/uL 8 23     Results from last 7 days   Lab Units 06/02/23  2131   ANION GAP mmol/L 8   ALBUMIN g/dL 4 0   ALK PHOS U/L 170*   ALT U/L 150*   AST U/L 141*   BUN mg/dL 20   CALCIUM mg/dL 10 0   CHLORIDE mmol/L 103   CO2 mmol/L 24   CREATININE mg/dL 0 96   GLUCOSE RANDOM mg/dL 246*   POTASSIUM mmol/L 4 1   SODIUM mmol/L 135   TOTAL BILIRUBIN mg/dL 0 74     Results from last 7 days   Lab Units 06/02/23  2131   INR  0 96                   Lines/Drains:  Invasive Devices     Peripheral Intravenous Line  Duration           Peripheral IV 06/02/23 Left Antecubital <1 day                    Imaging: Reviewed radiology reports from this admission including: CT head  CTA head and neck with and without contrast   Final Result by Anna Lemus DO (06/02 2313)      No acute intracranial abnormality      No large vessel occlusion, aneurysm, or dissection  Workstation performed: [id number]         MRI Inpatient Order    (Results Pending)       EKG and Other Studies Reviewed on Admission:   · EKG: NSR  HR 74     ** Please Note: This note has been constructed using a voice recognition system   **  "

## 2023-06-03 NOTE — ASSESSMENT & PLAN NOTE
Reports to the ED with slurred speech, unsteady gait and L facial droop x1 day  · CTA head/neck negative  · MRI brain showed Acute/early subacute lacunar infarct in the right colin  No acute hemorrhage or mass effect  Nonacute lacunar infarct in the left cerebellum is new since 2021    · Continue ASA/statin  · Echo pending  · Telemetry  · Neurology consult pending  · PT/OT evals

## 2023-06-03 NOTE — ASSESSMENT & PLAN NOTE
· Of note, has declined cholesterol lowering medications in the past  · Statin ordered per stroke protocol  · Lipid panel pending

## 2023-06-03 NOTE — CASE MANAGEMENT
Case Management Assessment & Discharge Planning Note    Patient name Marene Koyanagi  Location [address] [place] [address] [id number] [id number]  : 1975 Date 6/3/2023       Current Admission Date: 2023  Current Admission Diagnosis:Right pontine stroke [facility])   Patient Active Problem List    Diagnosis Date Noted   • Alcohol use disorder 2023   • [facility] ) 2023   • Tobacco use 2023   • Fatty liver 2022   • Alcohol use disorder, moderate, in early remission ([facility] ) 2021   • Asymptomatic gallstones 2021   • Mixed hyperlipidemia 2021   • Alcohol withdrawal seizure ([place] [id number] ) 2021   • Type 2 diabetes mellitus without complication, without long-term current use of insulin ([place] [address] ) 2021   • Elevated LFTs 2021   • Essential hypertension 2016      LOS (days): 0  Geometric Mean LOS (GMLOS) (days):   Days to GMLOS:     OBJECTIVE:    Risk of Unplanned Readmission Score: 9 08     Current admission status: Inpatient     Preferred Pharmacy:   [place] [id number], [address] [address]  Phone: [phone] Fax: [phone]    Primary Care Provider: Juan Omer MD    Primary Insurance:   Secondary Insurance:     ASSESSMENT:  [address] [place] [address] Representative - Spouse   Primary Phone: [phone] (Home)               Advance Directives  Does patient have a [address] [place] [address] Avenue?: No  Does patient currently have a Health Care decision maker?: Yes, please see Health Care Proxy section  Does patient have Advance Directives?: No  Primary Contact: Loren Hernandez (Spouse)    Readmission Root Cause  30 Day Readmission: No    Patient Information  Admitted from[de-identified] Home  Mental Status: Alert  During Assessment patient was accompanied by: Not accompanied during assessment  Assessment information provided by[de-identified] Patient  Primary Caregiver: Self  Support Systems: Spouse/significant other, Family members  Home entry access options  Select all that apply : Stairs  Number of steps to enter home : 2  Type of Current Residence: Apartment  Floor Level: 1  Upon entering residence, is there a bedroom on the main floor (no further steps)?: Yes  Upon entering residence, is there a bathroom on the main floor (no further steps)?: Yes  Living Arrangements: Lives w/ Spouse/significant other    Activities of Daily Living Prior to Admission  Functional Status: Independent  Completes ADLs independently?: Yes  Ambulates independently?: Yes  Does patient use assisted devices?: No  Does patient currently own DME?: No  Does patient have a history of Outpatient Therapy (PT/OT)?: No  Does the patient have a history of Short-Term Rehab?: No  Does patient have a history of [facility]?: No  Does patient currently have [employer] ?: No    Patient Information Continued  Does patient have prescription coverage?: Yes  Does patient have a history of substance abuse?: Yes  Historical substance use preference: Alcohol/ETOH  Does patient have a history of Mental Health Diagnosis?: No    Means of Transportation  Means of Transport to [facility][de-identified] Drives Self        DISCHARGE DETAILS:    Discharge planning discussed with[de-identified] pt    Other Referral/Resources/Interventions Provided:  Referral Comments: PT/OT and additional after care needs pending   CM team will continue to follow pt through d/c

## 2023-06-03 NOTE — ED PROVIDER NOTES
History  Chief Complaint   Patient presents with   • Slurred Speech     Patient started with slurred speech yesterday  Unstable gait and and weakness around 5 pm yesterday but did not want to come to doctor  Worked through his shft and was brought in to hospital by family  Still believe speech is slurred now  Pt recently also stopped rinking last week, last time he stopped, had a seizure     27-year-old male with reported history of prior CVA, alcohol abuse stopped 1 week ago, presents emergency department due to gait instability that started around 5 PM yesterday as well as progression to slurred speech and facial droop noticed by family members  Patient notes that he started feeling like he was going to fall to the left when he was walking around at home but ignored the symptoms and went to work  Around 3 AM he started noticing difficulty speaking and when he got home from work in the morning, family members noticed this as well so they tried to convince him to come to the hospital but he did not want to  His niece drove down to the house to pick him up and bring him to the hospital   Per niece, he also has a drink heavily for very long time but stopped 1 week ago  He reportedly has had withdrawal seizures in the past   He denies any shakes or other withdrawal symptoms at this time  He denies headache, vision changes, numbness, weakness, or other complaints  No recent fever  No falls  Prior to Admission Medications   Prescriptions Last Dose Informant Patient Reported?  Taking?   bacitracin ointment   Yes No   Sig: Apply topically 2 (two) times a day   Patient not taking: Reported on 2021   metFORMIN (GLUCOPHAGE-XR) 500 mg 24 hr tablet   No No   Si tablet at dinner time for 7 days, then increase dose to 2 tablets at dinner time for 7 days, then increase dose to 3 tablets once a day at dinner time      Facility-Administered Medications: None       Past Medical History:   Diagnosis Date   • Acute lymphangitis of left upper limb 11/14/2016   • Elevated LFTs 7/31/2021   • Hypertension        History reviewed  No pertinent surgical history  History reviewed  No pertinent family history  I have reviewed and agree with the history as documented  E-Cigarette/Vaping   • E-Cigarette Use Never User      E-Cigarette/Vaping Substances   • Nicotine No    • THC No    • CBD No      Social History     Tobacco Use   • Smoking status: Heavy Smoker     Packs/day: 0 50     Types: Cigarettes   • Smokeless tobacco: Never   Vaping Use   • Vaping Use: Never used   Substance Use Topics   • Alcohol use: Not Currently   • Drug use: No        Review of Systems   All other systems reviewed and are negative  Physical Exam  ED Triage Vitals   Temperature Pulse Respirations Blood Pressure SpO2   06/02/23 2104 06/02/23 2104 06/02/23 2104 06/02/23 2104 06/02/23 2104   99 6 °F (37 6 °C) 100 18 (!) 208/117 97 %      Temp Source Heart Rate Source Patient Position - Orthostatic VS BP Location FiO2 (%)   06/02/23 2104 06/02/23 2104 06/03/23 0200 06/02/23 2131 --   Tympanic Monitor Sitting Right arm       Pain Score       06/03/23 0725       No Pain             Orthostatic Vital Signs  Vitals:    06/04/23 1852 06/04/23 2219 06/05/23 0421 06/05/23 0731   BP: 111/56 122/74 119/73 144/82   Pulse: 55 55 (!) 54 62   Patient Position - Orthostatic VS:           Physical Exam  Vitals and nursing note reviewed  Constitutional:       General: He is not in acute distress  Appearance: He is well-developed  HENT:      Head: Normocephalic and atraumatic  Mouth/Throat:      Comments: No tongue fasciculations  Eyes:      Extraocular Movements: Extraocular movements intact  Conjunctiva/sclera: Conjunctivae normal       Pupils: Pupils are equal, round, and reactive to light  Cardiovascular:      Rate and Rhythm: Normal rate and regular rhythm  Heart sounds: No murmur heard    Pulmonary:      Effort: Pulmonary effort is normal  No respiratory distress  Breath sounds: Normal breath sounds  Abdominal:      Palpations: Abdomen is soft  Tenderness: There is no abdominal tenderness  Musculoskeletal:         General: No swelling  Cervical back: Neck supple  Skin:     General: Skin is warm and dry  Capillary Refill: Capillary refill takes less than 2 seconds  Neurological:      Mental Status: He is alert and oriented to person, place, and time  Comments: Left lip droop/smile asymmetry, slurred speech  Neurologic exam otherwise intact  NIHSS 2     Psychiatric:         Mood and Affect: Mood normal          ED Medications  Medications   aspirin chewable tablet 81 mg (81 mg Oral Given 6/5/23 0839)   thiamine tablet 100 mg (100 mg Oral Given 1/6/95 1468)   folic acid (FOLVITE) tablet 1 mg (1 mg Oral Given 6/5/23 0839)   multivitamin-minerals (CENTRUM) tablet 1 tablet (1 tablet Oral Given 6/5/23 0839)   insulin lispro (HumaLOG) 100 units/mL subcutaneous injection 1-6 Units ( Subcutaneous Not Given 6/5/23 0610)   insulin lispro (HumaLOG) 100 units/mL subcutaneous injection 1-5 Units (1 Units Subcutaneous Given 6/4/23 2154)   nicotine (NICODERM CQ) 14 mg/24hr TD 24 hr patch 14 mg (14 mg Transdermal Medication Applied 6/5/23 0840)   heparin (porcine) subcutaneous injection 5,000 Units (5,000 Units Subcutaneous Given 6/5/23 0552)   clopidogrel (PLAVIX) tablet 75 mg (75 mg Oral Given 6/5/23 0839)   methocarbamol (ROBAXIN) tablet 500 mg (500 mg Oral Given 6/5/23 0843)   thiamine (VITAMIN B1) 100 mg in sodium chloride 0 9 % 50 mL IVPB (0 mg Intravenous Stopped 1/0/54 2234)   folic acid 1 mg in sodium chloride 0 9 % 50 mL IVPB (0 mg Intravenous Stopped 6/2/23 2230)   multivitamin-minerals (CENTRUM) tablet 1 tablet (1 tablet Oral Given 6/2/23 2144)   iohexol (OMNIPAQUE) 350 MG/ML injection (SINGLE-DOSE) 85 mL (85 mL Intravenous Given 6/2/23 2209)   aspirin tablet 325 mg (325 mg Oral Given 6/3/23 0415)       Diagnostic Studies  Results Reviewed     Procedure Component Value Units Date/Time    Hemoglobin A1c w/EAG Estimation [[id number]]  (Abnormal) Collected: 06/02/23 2131    Lab Status: Final result Specimen: Blood from Arm, Left Updated: 06/03/23 0601     Hemoglobin A1C 6 9 %       mg/dl     HS Troponin I 4hr [[id number]]  (Normal) Collected: 06/03/23 0416    Lab Status: Final result Specimen: Blood from Arm, Left Updated: 06/03/23 0521     hs TnI 4hr 9 ng/L      Delta 4hr hsTnI 1 ng/L     Lipid Panel with Direct LDL reflex [[id number]]  (Abnormal) Collected: 06/03/23 0416    Lab Status: Final result Specimen: Blood from Arm, Left Updated: 06/03/23 0452     Cholesterol 211 mg/dL      Triglycerides 216 mg/dL      HDL, Direct 80 mg/dL      LDL Calculated 88 mg/dL     Comprehensive metabolic panel [[id number]]  (Abnormal) Collected: 06/03/23 0416    Lab Status: Final result Specimen: Blood from Arm, Left Updated: 06/03/23 0452     Sodium 134 mmol/L      Potassium 4 1 mmol/L      Chloride 105 mmol/L      CO2 25 mmol/L      ANION GAP 4 mmol/L      BUN 16 mg/dL      Creatinine 0 88 mg/dL      Glucose 268 mg/dL      Calcium 9 6 mg/dL       U/L       U/L      Alkaline Phosphatase 151 U/L      Total Protein 7 2 g/dL      Albumin 3 5 g/dL      Total Bilirubin 0 74 mg/dL      eGFR 100 ml/min/1 73sq m     Narrative:      [employer] guidelines for Chronic Kidney Disease (CKD):   •  Stage 1 with normal or high GFR (GFR > 90 mL/min/1 73 square meters)  •  Stage 2 Mild CKD (GFR = 60-89 mL/min/1 73 square meters)  •  Stage 3A Moderate CKD (GFR = 45-59 mL/min/1 73 square meters)  •  Stage 3B Moderate CKD (GFR = 30-44 mL/min/1 73 square meters)  •  Stage 4 Severe CKD (GFR = 15-29 mL/min/1 73 square meters)  •  Stage 5 End Stage CKD (GFR <15 mL/min/1 73 square meters)  Note: GFR calculation is accurate only with a steady state creatinine    CBC (With Platelets) [[id number]]  (Abnormal) Collected: 06/03/23 6453    Lab Status: Final result Specimen: Blood from Arm, Left Updated: 06/03/23 0431     WBC 6 76 Thousand/uL      RBC 4 24 Million/uL      Hemoglobin 14 7 g/dL      Hematocrit 41 2 %      MCV 97 fL      MCH 34 7 pg      MCHC 35 7 g/dL      RDW 13 9 %      Platelets 499 Thousands/uL      MPV 10 0 fL     Urine Macroscopic, POC [[id number]] Collected: 06/03/23 0104    Lab Status: Final result Specimen: Urine Updated: 06/03/23 0105     Color, UA Yellow     Clarity, UA Clear     pH, UA 5 5     Leukocytes, UA Negative     Nitrite, UA Negative     Protein, UA Negative mg/dl      Glucose, UA Negative mg/dl      Ketones, UA Negative mg/dl      Urobilinogen, UA 0 2 E U /dl      Bilirubin, UA Negative     Occult Blood, UA Negative     Specific Gravity, UA 1 015    Narrative:      CLINITEK RESULT    HS Troponin I 2hr [[id number]]  (Normal) Collected: 06/02/23 2319    Lab Status: Final result Specimen: Blood from Arm, Left Updated: 06/03/23 0004     hs TnI 2hr 9 ng/L      Delta 2hr hsTnI 1 ng/L     TSH [[id number]]  (Normal) Collected: 06/02/23 2131    Lab Status: Final result Specimen: Blood from Arm, Left Updated: 06/02/23 2216     TSH 3RD GENERATON 1 660 uIU/mL     HS Troponin 0hr (reflex protocol) [[id number]]  (Normal) Collected: 06/02/23 2131    Lab Status: Final result Specimen: Blood from Arm, Left Updated: 06/02/23 2207     hs TnI 0hr 8 ng/L     Comprehensive metabolic panel [[id number]]  (Abnormal) Collected: 06/02/23 2131    Lab Status: Final result Specimen: Blood from Arm, Left Updated: 06/02/23 2200     Sodium 135 mmol/L      Potassium 4 1 mmol/L      Chloride 103 mmol/L      CO2 24 mmol/L      ANION GAP 8 mmol/L      BUN 20 mg/dL      Creatinine 0 96 mg/dL      Glucose 246 mg/dL      Calcium 10 0 mg/dL       U/L       U/L      Alkaline Phosphatase 170 U/L      Total Protein 8 0 g/dL      Albumin 4 0 g/dL      Total Bilirubin 0 74 mg/dL      eGFR 92 ml/min/1 73sq m     Narrative:      [employer] guidelines for Chronic Kidney Disease (CKD):   •  Stage 1 with normal or high GFR (GFR > 90 mL/min/1 73 square meters)  •  Stage 2 Mild CKD (GFR = 60-89 mL/min/1 73 square meters)  •  Stage 3A Moderate CKD (GFR = 45-59 mL/min/1 73 square meters)  •  Stage 3B Moderate CKD (GFR = 30-44 mL/min/1 73 square meters)  •  Stage 4 Severe CKD (GFR = 15-29 mL/min/1 73 square meters)  •  Stage 5 End Stage CKD (GFR <15 mL/min/1 73 square meters)  Note: GFR calculation is accurate only with a steady state creatinine    Phosphorus [[id number]]  (Normal) Collected: 06/02/23 2131    Lab Status: Final result Specimen: Blood from Arm, Left Updated: 06/02/23 2200     Phosphorus 3 0 mg/dL     Magnesium [[id number]]  (Normal) Collected: 06/02/23 2131    Lab Status: Final result Specimen: Blood from Arm, Left Updated: 06/02/23 2200     Magnesium 2 3 mg/dL     Protime-INR [[id number]]  (Normal) Collected: 06/02/23 2131    Lab Status: Final result Specimen: Blood from Arm, Left Updated: 06/02/23 2154     Protime 13 0 seconds      INR 0 96    APTT [[id number]]  (Normal) Collected: 06/02/23 2131    Lab Status: Final result Specimen: Blood from Arm, Left Updated: 06/02/23 2154     PTT 29 seconds     CBC and differential [[id number]]  (Abnormal) Collected: 06/02/23 2131    Lab Status: Final result Specimen: Blood from Arm, Left Updated: 06/02/23 2142     WBC 8 23 Thousand/uL      RBC 4 49 Million/uL      Hemoglobin 16 0 g/dL      Hematocrit 43 5 %      MCV 97 fL      MCH 35 6 pg      MCHC 36 8 g/dL      RDW 14 0 %      MPV 9 8 fL      Platelets 841 Thousands/uL      nRBC 0 /100 WBCs      Neutrophils Relative 61 %      Immat GRANS % 1 %      Lymphocytes Relative 26 %      Monocytes Relative 10 %      Eosinophils Relative 1 %      Basophils Relative 1 %      Neutrophils Absolute 5 07 Thousands/µL      Immature Grans Absolute 0 05 Thousand/uL      Lymphocytes Absolute 2 12 Thousands/µL      Monocytes Absolute 0 83 Thousand/µL Eosinophils Absolute 0 10 Thousand/µL      Basophils Absolute 0 06 Thousands/µL                  MRI brain wo contrast   Final Result by KAROLINE Bell MD (06/03 0840)      Acute/early subacute lacunar infarct in the right colin  No acute hemorrhage or mass effect      Nonacute lacunar infarct in the left cerebellum is new since 2021  Findings concur with the preliminary report by the referring clinician already in PACS and/or our electronic record EPIC  Workstation performed: [id number]         CTA head and neck with and without contrast   Final Result by Jose Maria Kirby DO (06/02 2313)      No acute intracranial abnormality      No large vessel occlusion, aneurysm, or dissection  Workstation performed: [id number]               Procedures  Procedures      ED Course  ED Course as of 06/05/23 1103   Fri Jun 02, 2023   2212 AST(!): 141   2212 ALT(!): 150                                       Medical Decision Making  51 y o M presenting to the ED due to stroke like symptoms  Patient is >24 hours since onset so no stroke alert will be called, but workup will still be obtained while in the ED  Patients initial workup negative  Patient started on care for possible alcohol withdrawal including vitamins and CIWA  Patient admitted to internal medicine for further completion of stroke workup  Amount and/or Complexity of Data Reviewed  Labs: ordered  Decision-making details documented in ED Course  Radiology: ordered  Risk  OTC drugs  Prescription drug management  Decision regarding hospitalization              Disposition  Final diagnoses:   Speech disturbance   Facial droop     Time reflects when diagnosis was documented in both MDM as applicable and the Disposition within this note     Time User Action Codes Description Comment    6/3/2023 12:31 AM Gerdennisne Jewelmakarla Add [R47 9] Speech disturbance     6/3/2023 12:31 AM Gerdennisne Dulce Add [R29 810] Facial droop     6/3/2023 12:47 AM Beth Hilario Peyton Add [R29 90] Stroke-like symptoms       ED Disposition     ED Disposition   Admit    Condition   Stable    Date/Time   Sat Renaldo 3, 2023 12:31 AM    Comment   Case was discussed with Dr Pedro Sunshine and the patient's admission status was agreed to be Admission Status: inpatient status to the service of Dr Pedro Sunshine   Follow-up Information     Follow up With Specialties Details Why Contact Info Additional Sofy Franco [facility] Follow up The office will call to schedule a hospital follow up appointment  [address]  [phone] [facility], [address], [place], [place], [address]          Current Discharge Medication List      CONTINUE these medications which have NOT CHANGED    Details   bacitracin ointment Apply topically 2 (two) times a day      metFORMIN (GLUCOPHAGE-XR) 500 mg 24 hr tablet 1 tablet at dinner time for 7 days, then increase dose to 2 tablets at dinner time for 7 days, then increase dose to 3 tablets once a day at dinner time  Qty: 90 tablet, Refills: 5    Associated Diagnoses: Type 2 diabetes mellitus without complication, without long-term current use of insulin ([facility] [address] )           No discharge procedures on file  PDMP Review     None           ED Provider  Attending physically available and evaluated Mariama Brito I managed the patient along with the ED Attending      Electronically Signed by         Kenneth Sanchez MD  06/05/23 5840

## 2023-06-03 NOTE — ASSESSMENT & PLAN NOTE
Of note, has declined cholesterol lowering medications in the past  · Statin ordered per stroke protocol  · Lipid panel reviewed, total cholesterol and triglycerides elevated

## 2023-06-03 NOTE — ASSESSMENT & PLAN NOTE
· Last drink x10 days ago; recommend cessation  · Start folic acid, thiamine and MV supplementation  · CIWA protocol

## 2023-06-03 NOTE — ASSESSMENT & PLAN NOTE
51year old male with HTN, HLD, DM type 2, prior seizure, presumed to be alcohol withdrawal related, and alcohol abuse, with last drink being 1 week ago, presented to the ED for evaluation of left facial droop, dysarthria, and gait unsteadiness  BP on arrival 208/117  CTA head and neck negative for significant stenosis or acute pathology  MRI brain confirmed a right pontine infarct  Suspect etiology to be secondary to small vessel disease  Plan:  - Stroke pathway  • Echo pending-can be completed outpatient if otherwise stable for discharge  • Lipid Panel: , , LDL 88, HDL 80  • Hemoglobin A1c 6 9%  • S/p aspirin load  Continue on DAPT with Aspirin 81 mg and Plavix 75 mg daily x 3 weeks, then aspirin monotherapy  • Can d/c statin- LDL WNL and patient has elevated LFTs  Can consider a low-dose in the future  • Continue telemetry  • PT/OT/ST  • Frequent neuro checks  Continue to monitor and notify neurology with any changes  - Medical management and supportive care per primary team  Correction of any metabolic or infectious disturbances

## 2023-06-03 NOTE — ASSESSMENT & PLAN NOTE
· Reports to the ED with slurred speech, unsteady gait and L facial droop x1 day; initial NIHSS 2  · CTA head/neck negative  · Neuro consulted  · Stroke pathway  ·  mg Oral now   · ASA 81 mg oral daily   · Lipitor 40mg oral daily   · MRI and Echo pending

## 2023-06-03 NOTE — ASSESSMENT & PLAN NOTE
Last drink x10 days ago  · Continue folic acid, thiamine and MV supplementation  · CIWA protocol  · Encourage cessation

## 2023-06-03 NOTE — ASSESSMENT & PLAN NOTE
Lab Results   Component Value Date    HGBA1C 6 9 (H) 06/02/2023        · A1C 6 9  Non-compliant with metformin as an outpatient; would resume on discharge     · SSI  · Diabetic diet  · Hypoglycemia protocol

## 2023-06-03 NOTE — ASSESSMENT & PLAN NOTE
"Lab Results   Component Value Date    HGBA1C 6 7 12/22/2021       No results for input(s): \"POCGLU\" in the last 72 hours      Blood Sugar Average: Last 72 hrs:     · Takes metformin as an outpatient; switched to SSI while inpatient  · Diabetic diet  · Hypoglycemia protocol  · A1C pending  "

## 2023-06-03 NOTE — PLAN OF CARE
Problem: Neurological Deficit  Goal: Neurological status is stable or improving  Description: Interventions:  - Monitor and assess patient's level of consciousness, motor function, sensory function, and level of assistance needed for ADLs  - Monitor and report changes from baseline  Collaborate with interdisciplinary team to initiate plan and implement interventions as ordered  - Provide and maintain a safe environment  - Consider seizure precautions  - Consider fall precautions  - Consider aspiration precautions  - Consider bleeding precautions  Outcome: Progressing     Problem: Activity Intolerance/Impaired Mobility  Goal: Mobility/activity is maintained at optimum level for patient  Description: Interventions:  - Assess and monitor patient  barriers to mobility and need for assistive/adaptive devices  - Assess patient's emotional response to limitations  - Collaborate with interdisciplinary team and initiate plans and interventions as ordered  - Encourage independent activity per ability   - Maintain proper body alignment  - Perform active/passive rom as tolerated/ordered  - Plan activities to conserve energy   - Turn patient as appropriate  Outcome: Progressing     Problem: Communication Impairment  Goal: Ability to express needs and understand communication  Description: Assess patient's communication skills and ability to understand information  Patient will demonstrate use of effective communication techniques, alternative methods of communication and understanding even if not able to speak  - Encourage communication and provide alternate methods of communication as needed  - Collaborate with case management/ for discharge needs  - Include patient/family/caregiver in decisions related to communication    Outcome: Progressing     Problem: Potential for Aspiration  Goal: Non-ventilated patient's risk of aspiration is minimized  Description: Assess and monitor vital signs, respiratory status, and labs (WBC)  Monitor for signs of aspiration (tachypnea, cough, rales, wheezing, cyanosis, fever)  - Assess and monitor patient's ability to swallow  - Place patient up in chair to eat if possible  - HOB up at 90 degrees to eat if unable to get patient up into chair   - Supervise patient during oral intake  - Instruct patient/ family to take small bites  - Instruct patient/ family to take small single sips when taking liquids  - Follow patient-specific strategies generated by speech pathologist   Outcome: Progressing  Goal: Ventilated patient's risk of aspiration is minimized  Description: Assess and monitor vital signs, respiratory status, airway cuff pressure, and labs (WBC)  Monitor for signs of aspiration (tachypnea, cough, rales, wheezing, cyanosis, fever)  - Elevate head of bed 30 degrees if patient has tube feeding   - Monitor tube feeding  Outcome: Progressing     Problem: Nutrition  Goal: Nutrition/Hydration status is improving  Description: Monitor and assess patient's nutrition/hydration status for malnutrition (ex- brittle hair, bruises, dry skin, pale skin and conjunctiva, muscle wasting, smooth red tongue, and disorientation)  Collaborate with interdisciplinary team and initiate plan and interventions as ordered  Monitor patient's weight and dietary intake as ordered or per policy  Utilize nutrition screening tool and intervene per policy  Determine patient's food preferences and provide high-protein, high-caloric foods as appropriate  - Assist patient with eating   - Allow adequate time for meals   - Encourage patient to take dietary supplement as ordered  - Collaborate with clinical nutritionist   - Include patient/family/caregiver in decisions related to nutrition    Outcome: Progressing     Problem: PAIN - ADULT  Goal: Verbalizes/displays adequate comfort level or baseline comfort level  Description: Interventions:  - Encourage patient to monitor pain and request assistance  - Assess pain using appropriate pain scale  - Administer analgesics based on type and severity of pain and evaluate response  - Implement non-pharmacological measures as appropriate and evaluate response  - Consider cultural and social influences on pain and pain management  - Notify physician/advanced practitioner if interventions unsuccessful or patient reports new pain  Outcome: Progressing     Problem: INFECTION - ADULT  Goal: Absence or prevention of progression during hospitalization  Description: INTERVENTIONS:  - Assess and monitor for signs and symptoms of infection  - Monitor lab/diagnostic results  - Monitor all insertion sites, i e  indwelling lines, tubes, and drains  - Monitor endotracheal if appropriate and nasal secretions for changes in amount and color  - [place] appropriate cooling/warming therapies per order  - Administer medications as ordered  - Instruct and encourage patient and family to use good hand hygiene technique  - Identify and instruct in appropriate isolation precautions for identified infection/condition  Outcome: Progressing  Goal: Absence of fever/infection during neutropenic period  Description: INTERVENTIONS:  - Monitor WBC    Outcome: Progressing     Problem: SAFETY ADULT  Goal: Patient will remain free of falls  Description: INTERVENTIONS:  - Educate patient/family on patient safety including physical limitations  - Instruct patient to call for assistance with activity   - Consult OT/PT to assist with strengthening/mobility   - Keep Call bell within reach  - Keep bed low and locked with side rails adjusted as appropriate  - Keep care items and personal belongings within reach  - Initiate and maintain comfort rounds  - Make Fall Risk Sign visible to staff  - Apply yellow socks and bracelet for high fall risk patients  - Consider moving patient to room near nurses station  Outcome: Progressing  Goal: Maintain or return to baseline ADL function  Description: INTERVENTIONS:  -  Assess patient's ability to carry out ADLs; assess patient's baseline for ADL function and identify physical deficits which impact ability to perform ADLs (bathing, care of mouth/teeth, toileting, grooming, dressing, etc )  - Assess/evaluate cause of self-care deficits   - Assess range of motion  - Assess patient's mobility; develop plan if impaired  - Assess patient's need for assistive devices and provide as appropriate  - Encourage maximum independence but intervene and supervise when necessary  - Involve family in performance of ADLs  - Assess for home care needs following discharge   - Consider OT consult to assist with ADL evaluation and planning for discharge  - Provide patient education as appropriate  Outcome: Progressing  Goal: Maintains/Returns to pre admission functional level  Description: INTERVENTIONS:  - Perform BMAT or MOVE assessment daily    - Set and communicate daily mobility goal to care team and patient/family/caregiver     - Collaborate with rehabilitation services on mobility goals if consulted  - Out of bed for toileting  - Record patient progress and toleration of activity level   Outcome: Progressing     Problem: DISCHARGE PLANNING  Goal: Discharge to home or other facility with appropriate resources  Description: INTERVENTIONS:  - Identify barriers to discharge w/patient and caregiver  - Arrange for needed discharge resources and transportation as appropriate  - Identify discharge learning needs (meds, wound care, etc )  - Arrange for interpretive services to assist at discharge as needed  - Refer to Case Management Department for coordinating discharge planning if the patient needs post-hospital services based on physician/advanced practitioner order or complex needs related to functional status, cognitive ability, or social support system  Outcome: Progressing     Problem: Knowledge Deficit  Goal: Patient/family/caregiver demonstrates understanding of disease process, treatment plan, medications, and discharge instructions  Description: Complete learning assessment and assess knowledge base    Interventions:  - Provide teaching at level of understanding  - Provide teaching via preferred learning methods  Outcome: Progressing

## 2023-06-03 NOTE — PROGRESS NOTES
[facility]  Progress Note  Name: Maurilio Dey  MRN: [id number]  Unit/Bed#: [facility] 727-01 I Date of Admission: 6/2/2023   Date of Service: 6/3/2023 I Hospital Day: 0    Assessment/Plan   * Right pontine stroke [facility])  Assessment & Plan  Reports to the ED with slurred speech, unsteady gait and L facial droop x1 day  · CTA head/neck negative  · MRI brain showed Acute/early subacute lacunar infarct in the right colin  No acute hemorrhage or mass effect  Nonacute lacunar infarct in the left cerebellum is new since 2021  · Continue ASA/statin  · Echo pending  · Telemetry  · Neurology consult pending  · PT/OT evals       Alcohol use disorder  Assessment & Plan  Last drink x10 days ago  · Continue folic acid, thiamine and MV supplementation  · CIWA protocol  · Encourage cessation    Elevated LFTs  Assessment & Plan  Appears chronic upon chart review, likely in the setting of ETOH use  · RUQ US in 2021 showed hepatomegaly and moderate steatosis  · Can trend PRN    Type 2 diabetes mellitus without complication, without long-term current use of insulin (HCC)  Assessment & Plan  Lab Results   Component Value Date    HGBA1C 6 9 (H) 06/02/2023        · A1C 6 9  Non-compliant with metformin as an outpatient; would resume on discharge  · SSI  · Diabetic diet  · Hypoglycemia protocol    Mixed hyperlipidemia  Assessment & Plan  Of note, has declined cholesterol lowering medications in the past  · Statin ordered per stroke protocol  · Lipid panel reviewed, total cholesterol and triglycerides elevated  Tobacco use  Assessment & Plan  Smokes appx 10 cigarettes a day  · NRT         VTE Pharmacologic Prophylaxis: VTE Score: 9 Moderate Risk (Score 3-4) - Pharmacological DVT Prophylaxis Ordered: heparin  Patient Centered Rounds: I performed bedside rounds with nursing staff today    Discussions with Specialists or Other Care Team Provider: nursing    Education and Discussions with Family / Patient: Updated  (wife and sister) at bedside  Total Time Spent on Date of Encounter in care of patient: 35 minutes This time was spent on one or more of the following: performing physical exam; counseling and coordination of care; obtaining or reviewing history; documenting in the medical record; reviewing/ordering tests, medications or procedures; communicating with other healthcare professionals and discussing with patient's family/caregivers  Current Length of Stay: 0 day(s)  Current Patient Status: Inpatient   Certification Statement: The patient will continue to require additional inpatient hospital stay due to neurology evaluation, stroke work up  Discharge Plan: Anticipate discharge in 24-48 hrs to discharge location to be determined pending rehab evaluations  Code Status: Level 1 - Full Code    Subjective:   Patient continues to report slurred speech and left sided weakness  No visual changes  No tingling or numbness  Reports that he smokes appx 10 cigarettes a day  He reports frequent drinking but no drink for appx 10 days  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 in the past  Admits to being non compliant with metformin  Objective:     Vitals:   Temp (24hrs), Av 6 °F (37 6 °C), Min:99 6 °F (37 6 °C), Max:99 6 °F (37 6 °C)    Temp:  [99 6 °F (37 6 °C)] 99 6 °F (37 6 °C)  HR:  [] 66  Resp:  [15-18] 18  BP: (124-208)/() 124/74  SpO2:  [96 %-99 %] 98 %  There is no height or weight on file to calculate BMI  Input and Output Summary (last 24 hours):   No intake or output data in the 24 hours ending 23 0931    Physical Exam:   Physical Exam  Vitals and nursing note reviewed  Constitutional:       General: He is not in acute distress  Cardiovascular:      Rate and Rhythm: Normal rate  Pulmonary:      Effort: No respiratory distress  Breath sounds: Normal breath sounds  Abdominal:      General: There is no distension        Palpations: Abdomen is soft       Tenderness: There is no abdominal tenderness  Musculoskeletal:         General: No swelling  Skin:     General: Skin is warm  Neurological:      Mental Status: He is alert and oriented to person, place, and time  Mental status is at baseline        Comments: Slight left facial droop, left sided weakness   Psychiatric:         Mood and Affect: Mood normal           Additional Data:     Labs:  Results from last 7 days   Lab Units 06/03/23  0416 06/02/23  2131   EOS PCT %  --  1   HEMATOCRIT % 41 2 43 5   HEMOGLOBIN g/dL 14 7 16 0   LYMPHS PCT %  --  26   MONOS PCT %  --  10   NEUTROS PCT %  --  61   PLATELETS Thousands/uL 165 183   WBC Thousand/uL 6 76 8 23     Results from last 7 days   Lab Units 06/03/23  0416   ANION GAP mmol/L 4   ALBUMIN g/dL 3 5   ALK PHOS U/L 151*   ALT U/L 133*   AST U/L 106*   BUN mg/dL 16   CALCIUM mg/dL 9 6   CHLORIDE mmol/L 105   CO2 mmol/L 25   CREATININE mg/dL 0 88   GLUCOSE RANDOM mg/dL 268*   POTASSIUM mmol/L 4 1   SODIUM mmol/L 134*   TOTAL BILIRUBIN mg/dL 0 74     Results from last 7 days   Lab Units 06/02/23  2131   INR  0 96         Results from last 7 days   Lab Units 06/02/23  2131   HEMOGLOBIN A1C % 6 9*           Lines/Drains:  Invasive Devices     Peripheral Intravenous Line  Duration           Peripheral IV 06/02/23 Left Antecubital <1 day                      Imaging: Reviewed radiology reports from this admission including: MRI brain    Recent Cultures (last 7 days):         Last 24 Hours Medication List:   Current Facility-Administered Medications   Medication Dose Route Frequency Provider Last Rate   • aspirin  81 mg Oral Daily INESSA Larkin     • atorvastatin  40 mg Oral QPM INESSA Larkin     • folic acid  1 mg Oral Daily INESSA Larkin     • heparin (porcine)  5,000 Units Subcutaneous [facility] Oneal Villasenor PA-C     • insulin lispro  1-5 Units Subcutaneous HS INESSA Larkin     • insulin lispro  1-6 Units Subcutaneous TID [address] , CRNP     • multivitamin-minerals  1 tablet Oral Daily INESSA Ruiz     • nicotine  14 mg Transdermal Daily Narinder Diallo PA-C     • thiamine  100 mg Oral Daily INESSA Ruiz          Today, Patient Was Seen By: INESSA Hutson    **Please Note: This note may have been constructed using a voice recognition system  **

## 2023-06-03 NOTE — ED ATTENDING ATTESTATION
6/2/2023  I, Janell Naidu MD, saw and evaluated the patient  I have discussed the patient with the resident/non-physician practitioner and agree with the resident's/non-physician practitioner's findings, Plan of Care, and MDM as documented in the resident's/non-physician practitioner's note, except where noted  All available labs and Radiology studies were reviewed  I was present for key portions of any procedure(s) performed by the resident/non-physician practitioner and I was immediately available to provide assistance  At this point I agree with the current assessment done in the Emergency Department    I have conducted an independent evaluation of this patient a history and physical is as follows:  Pt started with some abnormal gait at approx 5 pm yesterday then went to work Pt noted some speech lip findings at 3 am  Mild ha PE: alert nad heart reg lungs clear abd soft nontender ext nad neuro L facial flattening MDM: will do ct cta check labs and admit  ED Course         Critical Care Time  Procedures

## 2023-06-03 NOTE — SPEECH THERAPY NOTE
Speech-Language Pathology Bedside Swallow Evaluation    Patient Name: Aydin Wagner    [id number] Date: 6/3/2023     Problem List  Principal Problem:    Right pontine stroke [facility])  Active Problems:    Type 2 diabetes mellitus without complication, without long-term current use of insulin (HCC)    Elevated LFTs    Mixed hyperlipidemia    Alcohol use disorder    Tobacco use    Past Medical History  Past Medical History:   Diagnosis Date   • Acute lymphangitis of left upper limb 11/14/2016   • Elevated LFTs 7/31/2021   • Hypertension      Past Surgical History  History reviewed  No pertinent surgical history  Summary  Pt presented with functional appearing oral and pharyngeal stage swallowing skills with materials administered today  Mild L facial weakness however no s/s aspiration with PO intake  Pt reports mildly slurred and slowed speech  Will f/u for motor speech  Risk/s for Aspiration: low    Recommended Diet: regular diet and thin liquids   Recommended Form of Meds: whole with liquid   Aspiration precautions and swallowing strategies: upright posture  Other Recommendations: Continue frequent oral care      Current Medical Status per H&P 6/3/23  Aydin Wagner is a 51 y o  male with a PMH of type 2 diabetes, hypertension, alcohol abuse who presents with left-sided weakness, left-sided facial droop, slurred speech over the past 24 hours  The patient states around 5 PM yesterday he began to have slurred speech and a facial droop which was noticed by his family members  As well, he felt as if he was going to fall to his left side when he was walking at home  He states that he felt weak on his left side and felt like he was dragging his leg  He states that he went to work however and when he returned from work he had even more difficulty speaking    Eventually, he was convinced by family to come to the hospital   Of note, he has been a heavy drinker over the past several years however, he did stop 1 week ago and reports that he has not had a drink since  He states in the past, he has had withdrawal seizures however, he states that following cessation this week he had some shakiness but no other withdrawal symptoms  No headaches, vision changes, hearing changes  No numbness  No recent fevers  CTA of head was negative in ED  Patient admitted for MRI, stroke pathway work-up  Special Studies:  MRI brain 6/3/23 IMPRESSION:  Acute/early subacute lacunar infarct in the right colin  No acute hemorrhage or mass effect  Nonacute lacunar infarct in the left cerebellum is new since 2021  Social/Education/Vocational Hx:  Pt lives with family    Swallow Information   Current Risks for Dysphagia & Aspiration: CVA  Current Diet: regular diet and thin liquids   Baseline Diet: regular diet and thin liquids      Baseline Assessment   Behavior/Cognition: alert  Speech/Language Status: able to participate in conversation and able to follow commands  Patient Positioning: upright in bed  Pain Status/Interventions/Response to Interventions: No report of or nonverbal indications of pain  Swallow Mechanism Exam  Facial: left facial droop  Labial: decreased ROM left side  Lingual: left sided tongue deviation  Velum: symmetrical  Mandible: adequate ROM  Dentition: adequate  Vocal quality:clear/adequate   Volitional Cough: adequate   Respiratory Status: on RA      Consistencies Assessed and Performance   Consistencies Administered: thin liquids and hard solids    Oral Stage: WFL  Mastication was adequate with the materials administered today  Bolus formation and transfer were functional with no significant oral residue noted  No overt s/s reduced oral control  Pharyngeal Stage: WFL  Swallow Mechanics: Swallowing initiation appeared prompt  Laryngeal rise was palpated and judged to be within functional limits  No coughing, throat clearing, change in vocal quality or respiratory status noted today       Esophageal Concerns: none reported      Summary and Recommendations (see above)    Results Reviewed with: patient, RN and family     Treatment Recommended: Brief f/u for motor speech

## 2023-06-03 NOTE — CONSULTS
Consultation - Neurology   Aide Velez 51 y o  male MRN: [id number]  Unit/Bed#: [facility] 727-01 Encounter: [id number]      Assessment/Plan     * Right pontine stroke [facility])  Assessment & Plan  51year old male with HTN, HLD, DM type 2, prior seizure, presumed to be alcohol withdrawal related, and alcohol abuse, with last drink being 1 week ago, presented to the ED for evaluation of left facial droop, dysarthria, and gait unsteadiness  BP on arrival 208/117  CTA head and neck negative for significant stenosis or acute pathology  MRI brain confirmed a right pontine infarct  Suspect etiology to be secondary to small vessel disease  Plan:  - Stroke pathway  • Echo pending  • Lipid Panel: , , LDL 88, HDL 80  • Hemoglobin A1c 6 9%  • S/p aspirin load  Continue on DAPT with Aspirin 81 mg and Plavix 75 mg daily x 3 weeks, then aspirin monotherapy  • Can d/c statin- LDL WNL and patient has elevated LFTs  • Continue telemetry  • PT/OT/ST  • Frequent neuro checks  Continue to monitor and notify neurology with any changes  - Medical management and supportive care per primary team  Correction of any metabolic or infectious disturbances  Aide Velez will need follow up in 6 weeks with neurovascular attending or advance practitioner  He will not require outpatient neurological testing  History of Present Illness     Reason for Consult / Principal Problem: Speech disturbance, facial droop    HPI: Aide Velez is a 51 y o  male with alcohol abuse (last drink 1 week ago), chronic lacunar infarcts, HTN, HLD, prior seizure (possibly alcohol withdrawal), and DM type 2, presented to the ED on 6/2 for evaluation of stroke like symptoms  Around 5 pm on 6/1, the patient began noticing dysarthria  Family had noted a facial droop  Patient reports feeling unsteady, like he was falling to the left   Despite these symptoms, he still went to work, but around 3 am, he noted more difficulty speaking  Family convinced the patient to finally come to the hospital  Patient denied any other focal neurologic symptoms  Blood pressure on arrival was 208/117  ED noted slight left facial asymmetry on exam and mild dysarthria, otherwise no focal neurologic deficits  CTA head and neck was unremarkable  MRI brain has since been completed and indicated an acute infarct in the right colin  Inpatient consult to Neurology  Consult performed by: Gema Munroe PA-C  Consult ordered by: Autumn Nascimento MD          Review of Systems   Neurological: Positive for speech difficulty  Historical Information   Past Medical History:   Diagnosis Date   • Acute lymphangitis of left upper limb 11/14/2016   • Elevated LFTs 7/31/2021   • Hypertension      History reviewed  No pertinent surgical history  Social History   Social History     Substance and Sexual Activity   Alcohol Use Not Currently     Social History     Substance and Sexual Activity   Drug Use No     E-Cigarette/Vaping   • E-Cigarette Use Never User      E-Cigarette/Vaping Substances   • Nicotine No    • THC No    • CBD No      Social History     Tobacco Use   Smoking Status Heavy Smoker   • Packs/day: 0 50   • Types: Cigarettes   Smokeless Tobacco Never     Family History: History reviewed  No pertinent family history  Review of previous medical records was completed       Meds/Allergies   all current active meds have been reviewed, current meds:   Current Facility-Administered Medications   Medication Dose Route Frequency   • aspirin chewable tablet 81 mg  81 mg Oral Daily   • clopidogrel (PLAVIX) tablet 75 mg  75 mg Oral Daily   • folic acid (FOLVITE) tablet 1 mg  1 mg Oral Daily   • heparin (porcine) subcutaneous injection 5,000 Units  5,000 Units Subcutaneous Q8H [address]   • insulin lispro (HumaLOG) 100 units/mL subcutaneous injection 1-5 Units  1-5 Units Subcutaneous HS   • insulin lispro (HumaLOG) 100 units/mL subcutaneous injection 1-6 Units  1-6 Units Subcutaneous TID AC   • multivitamin-minerals (CENTRUM) tablet 1 tablet  1 tablet Oral Daily   • nicotine (NICODERM CQ) 14 mg/24hr TD 24 hr patch 14 mg  14 mg Transdermal Daily   • thiamine tablet 100 mg  100 mg Oral Daily    and PTA meds:   Prior to Admission Medications   Prescriptions Last Dose Informant Patient Reported? Taking?   bacitracin ointment   Yes No   Sig: Apply topically 2 (two) times a day   Patient not taking: Reported on 2021   metFORMIN (GLUCOPHAGE-XR) 500 mg 24 hr tablet   No No   Si tablet at dinner time for 7 days, then increase dose to 2 tablets at dinner time for 7 days, then increase dose to 3 tablets once a day at dinner time      Facility-Administered Medications: None       No Known Allergies    Objective   Vitals:Blood pressure 133/89, pulse 79, temperature 97 9 °F (36 6 °C), temperature source Oral, resp  rate 18, SpO2 97 %  ,There is no height or weight on file to calculate BMI  No intake or output data in the 24 hours ending 23 1137    Invasive Devices: Invasive Devices     Peripheral Intravenous Line  Duration           Peripheral IV 23 Left Antecubital <1 day                Physical Exam:   Vital signs and nursing notes reviewed  Constitutional: Patient is resting comfortably  Well developed, well nourished, in no acute distress  No diaphoresis  HENT: Normocephalic, atraumatic  Eyes: EOMs intact  Neck: Supple  Cardiovascular: Regular rate  Pulmonary: No respiratory distress  Effort normal    Musculoskeletal: Moves all extremities spontaneously and anti-gravity  Neurological:  Detailed below  Skin: Warm and dry  Psychiatric: Normal mood and affect  No hallucinations or agitation  Neurologic Exam:  Mental Status: Patient is awake and alert  Oriented  Follows commands without difficulty  Mild dysarthria  No aphasia  Cranial Nerves: Cranial nerves 2-12 grossly intact     Motor: Moves all extremities spontaneously and anti-gravity  No focal weakness  Sensation: Sensation to light touch intact  No tremors noted  Lab Results: I have personally reviewed pertinent reports  Imaging Studies: I have personally reviewed pertinent reports  and I have personally reviewed pertinent films in PACS (CTA, MRI)  EKG, Pathology, and Other Studies: I have personally reviewed pertinent reports      VTE Prophylaxis: Heparin    Code Status: Level 1 - Full Code

## 2023-06-04 ENCOUNTER — APPOINTMENT (INPATIENT)
Dept: NON INVASIVE DIAGNOSTICS | Facility: HOSPITAL | Age: 48
DRG: 045 | End: 2023-06-04
Payer: COMMERCIAL

## 2023-06-04 LAB
AORTIC ROOT: 2.6 CM
APICAL FOUR CHAMBER EJECTION FRACTION: 62 %
ASCENDING AORTA: 3 CM
E WAVE DECELERATION TIME: 189 MS
FRACTIONAL SHORTENING: 35 (ref 28–44)
GLUCOSE SERPL-MCNC: 120 MG/DL (ref 65–140)
GLUCOSE SERPL-MCNC: 138 MG/DL (ref 65–140)
GLUCOSE SERPL-MCNC: 155 MG/DL (ref 65–140)
GLUCOSE SERPL-MCNC: 160 MG/DL (ref 65–140)
INTERVENTRICULAR SEPTUM IN DIASTOLE (PARASTERNAL SHORT AXIS VIEW): 1.1 CM
INTERVENTRICULAR SEPTUM: 1.1 CM (ref 0.6–1.1)
LAAS-AP2: 20.6 CM2
LAAS-AP4: 18.4 CM2
LEFT ATRIUM SIZE: 4.3 CM
LEFT INTERNAL DIMENSION IN SYSTOLE: 3.2 CM (ref 2.1–4)
LEFT VENTRICULAR INTERNAL DIMENSION IN DIASTOLE: 4.9 CM (ref 3.5–6)
LEFT VENTRICULAR POSTERIOR WALL IN END DIASTOLE: 1.1 CM
LEFT VENTRICULAR STROKE VOLUME: 75 ML
LVSV (TEICH): 75 ML
MV E'TISSUE VEL-SEP: 9 CM/S
MV PEAK A VEL: 0.63 M/S
MV PEAK E VEL: 86 CM/S
MV STENOSIS PRESSURE HALF TIME: 55 MS
MV VALVE AREA P 1/2 METHOD: 4
RIGHT ATRIUM AREA SYSTOLE A4C: 14.1 CM2
RIGHT VENTRICLE ID DIMENSION: 3.4 CM
SL CV LEFT ATRIUM LENGTH A2C: 5.1 CM
SL CV LV EF: 60
SL CV PED ECHO LEFT VENTRICLE DIASTOLIC VOLUME (MOD BIPLANE) 2D: 115 ML
SL CV PED ECHO LEFT VENTRICLE SYSTOLIC VOLUME (MOD BIPLANE) 2D: 41 ML
TR MAX PG: 21 MMHG
TR PEAK VELOCITY: 2.3 M/S
TRICUSPID ANNULAR PLANE SYSTOLIC EXCURSION: 1.7 CM
TRICUSPID VALVE PEAK REGURGITATION VELOCITY: 2.27 M/S

## 2023-06-04 PROCEDURE — 99232 SBSQ HOSP IP/OBS MODERATE 35: CPT | Performed by: NURSE PRACTITIONER

## 2023-06-04 PROCEDURE — 82948 REAGENT STRIP/BLOOD GLUCOSE: CPT

## 2023-06-04 PROCEDURE — 93306 TTE W/DOPPLER COMPLETE: CPT | Performed by: INTERNAL MEDICINE

## 2023-06-04 PROCEDURE — 99232 SBSQ HOSP IP/OBS MODERATE 35: CPT | Performed by: PSYCHIATRY & NEUROLOGY

## 2023-06-04 PROCEDURE — 97166 OT EVAL MOD COMPLEX 45 MIN: CPT

## 2023-06-04 PROCEDURE — 97163 PT EVAL HIGH COMPLEX 45 MIN: CPT

## 2023-06-04 PROCEDURE — 93306 TTE W/DOPPLER COMPLETE: CPT

## 2023-06-04 RX ADMIN — FOLIC ACID 1 MG: 1 TABLET ORAL at 08:01

## 2023-06-04 RX ADMIN — METHOCARBAMOL TABLETS 500 MG: 500 TABLET, COATED ORAL at 08:06

## 2023-06-04 RX ADMIN — THIAMINE HCL TAB 100 MG 100 MG: 100 TAB at 08:01

## 2023-06-04 RX ADMIN — HEPARIN SODIUM 5000 UNITS: 5000 INJECTION INTRAVENOUS; SUBCUTANEOUS at 21:51

## 2023-06-04 RX ADMIN — HEPARIN SODIUM 5000 UNITS: 5000 INJECTION INTRAVENOUS; SUBCUTANEOUS at 05:57

## 2023-06-04 RX ADMIN — ASPIRIN 81 MG CHEWABLE TABLET 81 MG: 81 TABLET CHEWABLE at 08:01

## 2023-06-04 RX ADMIN — INSULIN LISPRO 1 UNITS: 100 INJECTION, SOLUTION INTRAVENOUS; SUBCUTANEOUS at 11:23

## 2023-06-04 RX ADMIN — Medication 1 TABLET: at 08:01

## 2023-06-04 RX ADMIN — INSULIN LISPRO 1 UNITS: 100 INJECTION, SOLUTION INTRAVENOUS; SUBCUTANEOUS at 21:54

## 2023-06-04 RX ADMIN — HEPARIN SODIUM 5000 UNITS: 5000 INJECTION INTRAVENOUS; SUBCUTANEOUS at 13:43

## 2023-06-04 RX ADMIN — METHOCARBAMOL TABLETS 500 MG: 500 TABLET, COATED ORAL at 17:56

## 2023-06-04 RX ADMIN — CLOPIDOGREL BISULFATE 75 MG: 75 TABLET ORAL at 08:01

## 2023-06-04 RX ADMIN — NICOTINE 14 MG: 14 PATCH, EXTENDED RELEASE TRANSDERMAL at 08:02

## 2023-06-04 NOTE — PROGRESS NOTES
[facility]  Progress Note  Name: Javier Spangler  MRN: [id number]  Unit/Bed#: [facility] 727-01 I Date of Admission: 6/2/2023   Date of Service: 6/4/2023 I Hospital Day: 1    Assessment/Plan   * Right pontine stroke [facility])  Assessment & Plan  Reports to the ED with slurred speech, unsteady gait and L facial droop x1 day  · CTA head/neck negative  · MRI brain showed Acute/early subacute lacunar infarct in the right colin  No acute hemorrhage or mass effect  Nonacute lacunar infarct in the left cerebellum is new since 2021  · Seen by neurology, likely small vessel as etiology  Plan to continue ASA/Plavix x 21 days then ASA monotherapy  · Echo pending  · Telemetry  · Outpatient neurology follow up  · PT/OT recommending rehab, would be good ARC candidate  CM made aware  Patient/wife agreeable  Alcohol use disorder  Assessment & Plan  Last drink x10 days ago  · Continue folic acid, thiamine and MV supplementation  · CIWA protocol  · Encourage cessation    Elevated LFTs  Assessment & Plan  Appears chronic upon chart review, likely in the setting of ETOH use  · RUQ US in 2021 showed hepatomegaly and moderate steatosis  · Can trend PRN    Type 2 diabetes mellitus without complication, without long-term current use of insulin (HCC)  Assessment & Plan  Lab Results   Component Value Date    HGBA1C 6 9 (H) 06/02/2023        · A1C 6 9  Non-compliant with metformin as an outpatient; would resume on discharge  · SSI  · Diabetic diet  · Hypoglycemia protocol    Mixed hyperlipidemia  Assessment & Plan  Of note, has declined cholesterol lowering medications in the past  · Statin discontinued by neurology as could contribute to increased ETOH intake  · Lipid panel reviewed, total cholesterol and triglycerides elevated but LDL < 100  Tobacco use  Assessment & Plan  Smokes appx 10 cigarettes a day    · NRT           VTE Pharmacologic Prophylaxis: VTE Score: 9 Moderate Risk (Score 3-4) - Pharmacological DVT Prophylaxis Ordered: heparin  Patient Centered Rounds: I performed bedside rounds with nursing staff today  Discussions with Specialists or Other Care Team Provider: nursing, neuro, case management (Tracy Ruano)     Education and Discussions with Family / Patient: Updated  (wife) at bedside  Total Time Spent on Date of Encounter in care of patient: 35 minutes This time was spent on one or more of the following: performing physical exam; counseling and coordination of care; obtaining or reviewing history; documenting in the medical record; reviewing/ordering tests, medications or procedures; communicating with other healthcare professionals and discussing with patient's family/caregivers  Current Length of Stay: 1 day(s)  Current Patient Status: Inpatient   Certification Statement: The patient will continue to require additional inpatient hospital stay due to pending echo and rehab planning, hopeful for ARC  Discharge Plan: Anticipate discharge tomorrow to rehab facility  Code Status: Level 1 - Full Code    Subjective:   No complaints  Feels that left sided weakness is slowly continuing to improve  Worked with PT/OT this AM, did not do well on stairs 2/2 LLE weakness and clumsiness, fatigued easily  Agreeable for rehab  Discussed ARC with patient/wife  Objective:     Vitals:   Temp (24hrs), Av 9 °F (36 6 °C), Min:97 3 °F (36 3 °C), Max:98 1 °F (36 7 °C)    Temp:  [97 3 °F (36 3 °C)-98 1 °F (36 7 °C)] 97 3 °F (36 3 °C)  HR:  [56-79] 68  Resp:  [16-18] 16  BP: (110-133)/(57-89) 128/74  SpO2:  [96 %-99 %] 97 %  Body mass index is 31 12 kg/m²  Input and Output Summary (last 24 hours): Intake/Output Summary (Last 24 hours) at 2023 1023  Last data filed at 6/3/2023 2200  Gross per 24 hour   Intake 578 ml   Output --   Net 578 ml       Physical Exam:   Physical Exam  Vitals and nursing note reviewed     Constitutional:       General: He is not in acute distress  Cardiovascular:      Rate and Rhythm: Normal rate  Pulmonary:      Effort: No respiratory distress  Abdominal:      Tenderness: There is no abdominal tenderness  Musculoskeletal:         General: No swelling  Skin:     General: Skin is warm  Neurological:      Mental Status: He is alert and oriented to person, place, and time  Mental status is at baseline  Comments: Slight left sided facial droop, left sided weakness although seems improved from yesterday      Psychiatric:         Mood and Affect: Mood normal           Additional Data:     Labs:  Results from last 7 days   Lab Units 06/03/23  0416 06/02/23  2131   EOS PCT %  --  1   HEMATOCRIT % 41 2 43 5   HEMOGLOBIN g/dL 14 7 16 0   LYMPHS PCT %  --  26   MONOS PCT %  --  10   NEUTROS PCT %  --  61   PLATELETS Thousands/uL 165 183   WBC Thousand/uL 6 76 8 23     Results from last 7 days   Lab Units 06/03/23  0416   ANION GAP mmol/L 4   ALBUMIN g/dL 3 5   ALK PHOS U/L 151*   ALT U/L 133*   AST U/L 106*   BUN mg/dL 16   CALCIUM mg/dL 9 6   CHLORIDE mmol/L 105   CO2 mmol/L 25   CREATININE mg/dL 0 88   GLUCOSE RANDOM mg/dL 268*   POTASSIUM mmol/L 4 1   SODIUM mmol/L 134*   TOTAL BILIRUBIN mg/dL 0 74     Results from last 7 days   Lab Units 06/02/23  2131   INR  0 96     Results from last 7 days   Lab Units 06/04/23  0640 06/03/23  2125 06/03/23  1604 06/03/23  1105   POC GLUCOSE mg/dl 120 199* 184* 135     Results from last 7 days   Lab Units 06/02/23  2131   HEMOGLOBIN A1C % 6 9*           Lines/Drains:  Invasive Devices     Peripheral Intravenous Line  Duration           Peripheral IV 06/02/23 Left Antecubital 1 day                  Telemetry:  Telemetry Orders (From admission, onward)             24 Hour Telemetry Monitoring  Continuous x 24 Hours (Telem)        Question:  Reason for 24 Hour Telemetry  Answer:  TIA/Suspected CVA/ Confirmed CVA                 Telemetry Reviewed: Normal Sinus Rhythm  Indication for Continued Telemetry Use: Acute CVA             Imaging: No pertinent imaging reviewed  Recent Cultures (last 7 days):         Last 24 Hours Medication List:   Current Facility-Administered Medications   Medication Dose Route Frequency Provider Last Rate   • aspirin  81 mg Oral Daily INESSA Hernandez     • clopidogrel  75 mg Oral Daily Brooke Manuel PA-C     • folic acid  1 mg Oral Daily INESSA Hernandez     • heparin (porcine)  5,000 Units Subcutaneous [facility] Oneal Villasenor PA-C     • insulin lispro  1-5 Units Subcutaneous HS INESSA Hernandez     • insulin lispro  1-6 Units Subcutaneous TID AC INESSA Hernandez     • methocarbamol  500 mg Oral Q6H PRN INESSA Cruz     • multivitamin-minerals  1 tablet Oral Daily INESSA Hernandez     • nicotine  14 mg Transdermal Daily Niurka Queazda PA-C     • thiamine  100 mg Oral Daily INESSA Hernandez          Today, Patient Was Seen By: INESSA Amado    **Please Note: This note may have been constructed using a voice recognition system  **

## 2023-06-04 NOTE — PROGRESS NOTES
Progress Note - Neurology   Arturo Caruso 51 y o  male [id number]  Unit/Bed#: [facility] 727/[facility] 727-01    Assessment/Plan:    * Right pontine stroke [facility])  Assessment & Plan  51year old male with HTN, HLD, DM type 2, prior seizure, presumed to be alcohol withdrawal related, and alcohol abuse, with last drink being 1 week ago, presented to the ED for evaluation of left facial droop, dysarthria, and gait unsteadiness  BP on arrival 208/117  CTA head and neck negative for significant stenosis or acute pathology  MRI brain confirmed a right pontine infarct  Suspect etiology to be secondary to small vessel disease  Plan:  - Stroke pathway  • Echo pending-can be completed outpatient if otherwise stable for discharge  • Lipid Panel: , , LDL 88, HDL 80  • Hemoglobin A1c 6 9%  • S/p aspirin load  Continue on DAPT with Aspirin 81 mg and Plavix 75 mg daily x 3 weeks, then aspirin monotherapy  • Can d/c statin- LDL WNL and patient has elevated LFTs  Can consider a low-dose in the future  • Continue telemetry  • PT/OT/ST  • Frequent neuro checks  Continue to monitor and notify neurology with any changes  - Medical management and supportive care per primary team  Correction of any metabolic or infectious disturbances  Arturo Caruso will need follow up in 6 weeks with neurovascular attending or advance practitioner  He will not require outpatient neurological testing  Subjective:   Patient is doing well today, but continues to note some gait dysfunction  He had trouble going downstairs when he was evaluated today  He was using a walker to get around the room because his left leg is dragging  He notes some trace  weakness on the left  Speech is improving, though slightly still slurred according to patient and family  No new symptoms overnight  Vitals have been stable          Past Medical History:   Diagnosis Date   • Acute lymphangitis of left upper limb 11/14/2016   • Elevated LFTs 7/31/2021   • Hypertension      History reviewed  No pertinent surgical history  History reviewed  No pertinent family history  Social History     Socioeconomic History   • Marital status: /Civil Union     Spouse name: None   • Number of children: None   • Years of education: None   • Highest education level: None   Occupational History   • None   Tobacco Use   • Smoking status: Heavy Smoker     Packs/day: 0 50     Types: Cigarettes   • Smokeless tobacco: Never   Vaping Use   • Vaping Use: Never used   Substance and Sexual Activity   • Alcohol use: Not Currently   • Drug use: No   • Sexual activity: None   Other Topics Concern   • None   Social History Narrative   • None     Social Determinants of Health     Financial Resource Strain: Not on file   Food Insecurity: Not on file   Transportation Needs: Not on file   Physical Activity: Not on file   Stress: Not on file   Social Connections: Not on file   Intimate Partner Violence: Not on file   Housing Stability: Not on file         Medications:   All current active meds have been reviewed and current meds:  Scheduled Meds:  Current Facility-Administered Medications   Medication Dose Route Frequency Provider Last Rate   • aspirin  81 mg Oral Daily Earl Jerad, CRNP     • clopidogrel  75 mg Oral Daily Brooke Manuel PA-C     • folic acid  1 mg Oral Daily Earl Jerad, CRNP     • heparin (porcine)  5,000 Units Subcutaneous [facility] Oneal Villasenor PA-C     • insulin lispro  1-5 Units Subcutaneous HS Earl Jerad, CRNP     • insulin lispro  1-6 Units Subcutaneous TID AC Earl Jerad, CRNP     • methocarbamol  500 mg Oral Q6H PRN INESSA Yi     • multivitamin-minerals  1 tablet Oral Daily Earl Jerad, CRNP     • nicotine  14 mg Transdermal Daily Antonieta Duong PA-C     • thiamine  100 mg Oral Daily Earl Jerad, CRNP       Continuous Infusions:   PRN Meds: •  methocarbamol       ROS:   Review of Systems "  Musculoskeletal: Positive for gait problem  Neurological: Positive for speech difficulty and weakness  Vitals:   /74   Pulse 68   Temp (!) 97 3 °F (36 3 °C)   Resp 16   Ht 5' 5\" (1 651 m)   SpO2 97%   BMI 31 12 kg/m²       Physical Exam:   Vital signs and nursing notes reviewed  Constitutional: Appears comfortable  Well developed/well nourished  No diaphoresis  No acute distress  HENT: Normocephalic, atraumatic  Eyes: EOMs intact without nystagmus  No scleral icterus or injection  No discharge  Neck: Supple, normal ROM  No stridor noted  Cardiovascular: Regular rate  Pulmonary: No respiratory distress  Effort normal  No stridor or wheezing evident  Musculoskeletal: Normal ROM  No tenderness, edema, or deformities noted  Neurological: Detailed below  Skin: Warm and dry  No erythema or rashes  No jaundice  Psychiatric: Normal mood and affect, normal thought process/thought content  No hallucinations  Good insight  Neurologic Exam:  Mental Status: Patient is awake and alert  Oriented  Follows all commands without difficulty  No dysarthria noted, though family at bedside and patient note that it continues to be slightly slurred  No aphasia  Cranial Nerves: Cranial nerves 2-12 intact  Motor: 5/5 strength throughout bilateral upper and lower extremities except for trace weakness of L hand  Sensation: Sensation to light touch intact throughout  Coordination: Finger to nose testing clumsy on the L  No tremors noted  Gait: Deferred - patient using a walker to get around  Has trouble going down stairs  Labs: I have personally reviewed pertinent reports     Recent Results (from the past 24 hour(s))   Fingerstick Glucose (POCT)    Collection Time: 06/03/23 11:05 AM   Result Value Ref Range    POC Glucose 135 65 - 140 mg/dl   Fingerstick Glucose (POCT)    Collection Time: 06/03/23  4:04 PM   Result Value Ref Range    POC Glucose 184 (H) 65 - 140 " mg/dl   Fingerstick Glucose (POCT)    Collection Time: 06/03/23  9:25 PM   Result Value Ref Range    POC Glucose 199 (H) 65 - 140 mg/dl   Fingerstick Glucose (POCT)    Collection Time: 06/04/23  6:40 AM   Result Value Ref Range    POC Glucose 120 65 - 140 mg/dl       Imaging: I have personally reviewed pertinent imaging in PACS, including MRI,  and I have personally reviewed PACS reports  EKG, Pathology, and Other Studies: I have personally reviewed pertinent reports  VTE Prophylaxis: Heparin      Counseling / Coordination of Care  Total time spent today 18 minutes  Greater than 50% of total time was spent with the patient and/or family counseling and/or coordination of care  A description of the counseling/coordination of care:  Patient was seen and evaluated  Discussed MRI results, stroke prevention, medications  Chart reviewed thoroughly including laboratory and imaging studies    Plan of care discussed with attending neurologist and primary team

## 2023-06-04 NOTE — ASSESSMENT & PLAN NOTE
Of note, has declined cholesterol lowering medications in the past  · Statin discontinued by neurology as could contribute to increased ETOH intake  · Lipid panel reviewed, total cholesterol and triglycerides elevated but LDL < 100

## 2023-06-04 NOTE — PHYSICAL THERAPY NOTE
Physical Therapy Evaluation     Patient Name: Jay Jay Oconnor    XMRVN'Q Date: 6/4/2023     Problem List  Principal Problem:    Right pontine stroke [facility])  Active Problems:    Type 2 diabetes mellitus without complication, without long-term current use of insulin (HCC)    Elevated LFTs    Mixed hyperlipidemia    Alcohol use disorder    Tobacco use       Past Medical History  Past Medical History:   Diagnosis Date    Acute lymphangitis of left upper limb 11/14/2016    Elevated LFTs 7/31/2021    Hypertension         Past Surgical History  History reviewed  No pertinent surgical history  06/04/23 0924   PT Last Visit   PT Visit Date 06/04/23   Note Type   Note type Evaluation   Pain Assessment   Pain Assessment Tool 0-10   Pain Score No Pain   Restrictions/Precautions   Weight Bearing Precautions Per Order No   Other Precautions Chair Alarm; Bed Alarm; Fall Risk   Home Living   Type of Home Apartment  (2 level apartment with 2 NORRIS)   Home Layout Two level;Bed/bath upstairs   Bathroom Shower/Tub Tub/shower unit   Bathroom Toilet Raised   Home Equipment   (none)   Additional Comments PTA, pt was independent with all functional mobility without AD and worked [employer]  Pt resides with his wife in a 2 story apartment with 2 NORRIS and 2nd floor bed/bathroom  Prior Function   Level of [place] Independent with functional mobility; Independent with ADLs   Lives With Spouse   IADLs Independent with driving; Independent with meal prep; Independent with medication management   Falls in the last 6 months 0   Vocational Full time employment   Cognition   Arousal/Participation Cooperative   Orientation Level Oriented to place;Oriented to person;Oriented to situation   Memory Decreased recall of precautions   Following Commands Follows one step commands without difficulty   Comments Pt is pleasant  Presents with slurred speech   Mild deficits in awareness of deficits; cues for "safety   Subjective   Subjective \"Tell me what I can do at home\"   RLE Assessment   RLE Assessment WFL   LLE Assessment   LLE Assessment X   Strength LLE   L Hip Flexion 4/5   L Knee Flexion 5/5   L Knee Extension 5/5   L Ankle Dorsiflexion 5/5   Coordination   Movements are Fluid and Coordinated 0   Coordination and Movement Description L foot drag during ambulatino   Heel to [place] Impaired   Light Touch   RLE Light Touch Grossly intact   LLE Light Touch Grossly intact   Bed Mobility   Additional Comments Standing upon arrival   Transfers   Sit to Stand 5  Supervision   Stand to Sit 5  Supervision   Additional Comments Performed without AD  Able to maintain standing with FT; mild LOB with FT, EC and head turns   Ambulation/Elevation   Gait pattern L Foot drag;Improper Weight shift;Decreased L stance;Shuffling   Gait Assistance 4  Minimal assist   Additional items Assist x 1;Verbal cues   Assistive Device None   Distance 30 feet x 2; Occasional L foot drag, cueing for heel strike   Stair Management Assistance (S)  4  Minimal assist  (mod A for LOB)   Additional items Assist x 1   Stair Management Technique One rail L;Step to pattern   Number of Stairs 5  (1 LOB during decent, mod A x 1 to recover LOB)   Balance   Static Sitting Fair   Dynamic Sitting Fair   Static Standing Fair -   Dynamic Standing Poor +   Ambulatory Poor +   Activity Tolerance   Activity Tolerance Patient limited by fatigue   Medical Staff Made Aware OT due to high complexity   Nurse Made Aware Yes RN   Assessment   Prognosis Excellent   Problem List Decreased strength;Decreased mobility; Impaired balance;Decreased coordination   Assessment Pt is 51 y o  male seen for high complexity PT evaluation s/p admission to [facility] on 6/2/23 for unsteady gait, L sided weakness, and slurred speech; dx with R pontine stroke   Comorbidities affecting pt's physical performance at time of assessment include: alcohol use, elevated LFT, type 2 DM, hyperlipidemia, tobacco " use  PTA, pt was independent with all functional mobility without AD and worked FT  Pt resides with his wife in a 2 story apartment with 2 NORRIS and 2nd floor bed/bathroom  Currently, pt is supervision for transfers and bed mobility; required min A for ambulation and stairs  He demonstrates mild L sided weakness but occasional L foot drag during ambulation without AD  On the steps, he had 1 LOB during step to decent pattern which required mod A x 1 to recover LOB  Patient's decreased mobility level and increased fall risk is secondary to deficits in L sided weakness, coordination, motor planning, gait dysfunction, higher level balance, and elevations  Current clinical presentation is unstable/unpredictable seen in pt's presentation of ongoing medical management, increased reliance on assistance compared to PLOF, impaired judgement/safety awareness, and significant PMH  Pt to benefit from continued PT tx to address deficits as defined above and maximize level of functional independent mobility and consistency  From PT/mobility standpoint, recommendation at time of d/c would be acute inpatient rehab (recommend PMR consult)  If patient is denied from IP rehab, recommend with outpatient PT  Barriers to Discharge Inaccessible home environment   Goals   Patient Goals To get stronger   STG Expiration Date 06/14/23   Short Term Goal #1 In 1-2 weeks, the patient will complete the following 1) Perform all aspect of bed mobility independently 2) Perform functional transfer with LRAD independently  3) Ambulate >150 feet with LRAD at mod I level  4) Negotiate 12 steps with 1 handrail and supervision 5) Patient will improve dynamic standing balance from poor + to fair + in order to perform everyday activities  6) Patient will continue with ongoing rehab services for family education, DME, and D/C planning   Plan   Treatment/Interventions LE strengthening/ROM; Functional transfer training; Therapeutic exercise;Elevations; Endurance training;Patient/family training;Bed mobility;Gait training   PT Frequency 4-6x/wk   Recommendation   PT Discharge Recommendation Post acute rehabilitation services  ((PMR consult))   Equipment Recommended   (If patient is denied from IP rehab, recommend with outpatient PT )   AM-PAC Basic Mobility Inpatient   Turning in Flat Bed Without Bedrails 4   Lying on Back to Sitting on Edge of Flat Bed Without Bedrails 4   Moving Bed to Chair 3   Standing Up From Chair Using Arms 4   Walk in Room 3   Climb 3-5 Stairs With Railing 2   Basic Mobility Inpatient Raw Score 20   Basic Mobility Standardized Score 43 99   Highest Level Of Mobility   JH-HLM Goal 6: Walk 10 steps or more   JH-HLM Achieved 7: Walk 25 feet or more   Barthel Index   Feeding 10   Bathing 0   Grooming Score 5   Dressing Score 10   Bladder Score 10   Bowels Score 10   Toilet Use Score 10   Transfers (Bed/Chair) Score 10   Mobility (Level Surface) Score 10   Stairs Score 5   Barthel Index Score 80   End of Consult   Patient Position at End of Consult Seated edge of bed; All needs within reach       Tori Camacho PT, DPT

## 2023-06-04 NOTE — ASSESSMENT & PLAN NOTE
Reports to the ED with slurred speech, unsteady gait and L facial droop x1 day  · CTA head/neck negative  · MRI brain showed Acute/early subacute lacunar infarct in the right colin  No acute hemorrhage or mass effect  Nonacute lacunar infarct in the left cerebellum is new since 2021  · Seen by neurology, likely small vessel as etiology  Plan to continue ASA/Plavix x 21 days then ASA monotherapy  · Echo pending  · Telemetry  · Outpatient neurology follow up  · PT/OT recommending rehab, would be good ARC candidate  CM made aware  Patient/wife agreeable

## 2023-06-04 NOTE — OCCUPATIONAL THERAPY NOTE
Occupational Therapy Evaluation     Patient Name: Arturo Caruso  GATBT'O Date: 6/4/2023  Problem List  Principal Problem:    Right pontine stroke [facility])  Active Problems:    Type 2 diabetes mellitus without complication, without long-term current use of insulin (HCC)    Elevated LFTs    Mixed hyperlipidemia    Alcohol use disorder    Tobacco use    Past Medical History  Past Medical History:   Diagnosis Date    Acute lymphangitis of left upper limb 11/14/2016    Elevated LFTs 7/31/2021    Hypertension      Past Surgical History  History reviewed  No pertinent surgical history  06/04/23 0925   OT Last Visit   OT Visit Date 06/04/23   Note Type   Note type Evaluation   Pain Assessment   Pain Assessment Tool 0-10   Pain Score No Pain   Restrictions/Precautions   Weight Bearing Precautions Per Order No   Other Precautions Fall Risk   Home Living   Type of Home Apartment   Home Layout Two level;Bed/bath upstairs; Other (Comment)  (2 NORRIS)   Bathroom Shower/Tub Tub/shower unit   [employer] Raised   Bathroom Equipment Other (Comment)  (denies any)   Home Equipment Other (Comment)  (denies any)   Additional Comments Pt resides in 2 story apt, 2 NORRIS, bed/bath upstairs   Prior Function   Level of [place] Independent with ADLs; Independent with functional mobility; Independent with IADLS   Lives With Spouse   Receives Help From Family   IADLs Independent with driving; Independent with meal prep; Independent with medication management   Falls in the last 6 months 0   Vocational Full time employment   Comments (+)    Lifestyle   Autonomy I w/ ADLS/IADLS, transfers and functional mobility PTA   Reciprocal Relationships Pt lives w/ his spouse   Service to 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   Intrinsic Gratification Enjoys TV   General   Family/Caregiver Present Yes   ADL   Eating Assistance 7  Independent   Grooming Assistance 5  [facility] 5  Supervision/Setup LB Bathing Assistance 4  Minimal Assistance   UB Dressing Assistance 5  Supervision/Setup   LB Dressing Assistance 4  Minimal Assistance   Toileting Assistance  4  Minimal Assistance   Functional Assistance 4  Minimal Assistance   Bed Mobility   Supine to Sit Unable to assess   Sit to Supine Unable to assess   Additional Comments pt standing upon arrival; seated EOB upon end of session   Transfers   Sit to Stand 5  Supervision   Additional items Increased time required;Verbal cues   Stand to Sit 5  Supervision   Additional items Increased time required;Verbal cues   Additional Comments no AD/DME used   Functional Mobility   Functional Mobility 4  Minimal assistance   Additional Comments Pt ambulated short household distance w/ Min A x1; occasional loss of balance (occasionally drags L foot and trips over it)  Balance   Static Sitting Fair +   Dynamic Sitting Fair   Static Standing Fair -   Dynamic Standing Poor +   Ambulatory Poor +   Activity Tolerance   Activity Tolerance Patient limited by fatigue   Medical Staff Made Aware PTEdith   Nurse Made Aware yes   RUE Assessment   RUE Assessment WFL   LUE Assessment   LUE Assessment WFL  (grossly 4/5)   Hand Function   Gross Motor Coordination Functional   Fine Motor Coordination Impaired   Hand Function Comments dysmetria and dysdiadokinesia (+) for LUE   Sensation   Light Touch No apparent deficits   Proprioception   Proprioception No apparent deficits   Vision-Basic Assessment   Current Vision No visual deficits   Psychosocial   Psychosocial (WDL) WDL   Perception   Inattention/Neglect Appears intact   Cognition   Overall Cognitive Status WFL   Arousal/Participation Responsive; Cooperative   Attention Within functional limits   Orientation Level Oriented X4   Memory Within functional limits   Following Commands Follows all commands and directions without difficulty   Comments Pt is pleasant and cooperative   Assessment   Limitation Decreased ADL status; Decreased UE strength;Decreased endurance;Decreased self-care trans;Decreased fine motor control;Decreased high-level ADLs   Prognosis Fair   Assessment Pt is a 52 y/o male seen for OT eval s/p adm to [facility] w/L sided weakness, L sided facial droop and slurred peech  CTA head was negative in ED  MRI brain confirmed R pontine stroke  Pt  has a past medical history of Acute lymphangitis of left upper limb (2016), Elevated LFTs (2021), and Hypertension  Pt with active OT orders and up with assistance  orders  Pt lives with his spouse in 2 story apt w/ 2 NORRIS  Pt was I w/  ADLS and IADLS, drove, & required no use of DME PTA  Pt is currently demonstrating the following occupational deficits: S UB ADLS, Min A LB ADLS, S transfers and Min A functional mobility w/o AD  These deficits that are impacting pt's baseline areas of occupation are a result of the following impairments: endurance, activity tolerance, functional mobility, forward functional reach, balance, functional standing tolerance, decreased I w/ ADLS/IADLS, strength, decreased safety awareness and coordination deficits  The following Occupational Performance Areas to address include: bathing/shower, toilet hygiene, dressing, health maintenance, functional mobility, community mobility, clothing management, household maintenance and job performance/volunteering  Recommend inpatient rehab  upon D/C  Pt to continue to benefit from acute immediate OT services to address the following goals 3-5x/week to  w/in 10-14 days:   Goals   Patient Goals to get stronger and more independent   LTG Time Frame 10-14   Long Term Goal #1 see below listed goals   Plan   Treatment Interventions ADL retraining;Functional transfer training;UE strengthening/ROM; Endurance training;Patient/family training;Equipment evaluation/education; Compensatory technique education; Fine motor coordination activities;Continued evaluation; Activityengagement; Energy conservation   Goal Expiration Date 06/18/23   OT Frequency 3-5x/wk   Recommendation   Recommendation (S)  Physiatry Consult   OT Discharge Recommendation Post acute rehabilitation services  (if pt is denied acute rehab then recommend return home w/ OP OT)   Additional Comments  The patient's raw score on the AM-PAC Daily Activity Inpatient Short Form is 21  A raw score of greater than or equal to 19 suggests the patient may benefit from discharge to home  Please refer to the recommendation of the Occupational Therapist for safe discharge planning   Additional Comments 2 Pt seen as a co-session due to the patient's co-morbidities, clinically unstable presentation, and present impairments which are a regression from the patient's baseline   -[facility] Daily Activity Inpatient   Lower Body Dressing 3   Bathing 3   Toileting 3   Upper Body Dressing 4   Grooming 4   Eating 4   Daily Activity Raw Score 21   Daily Activity Standardized Score (Calc for Raw Score >=11) 44 27   AM-[facility] Applied Cognition Inpatient   Following a Speech/Presentation 4   Understanding Ordinary Conversation 4   Taking Medications 4   Remembering Where Things Are Placed or Put Away 4   Remembering List of 4-5 Errands 4   Taking Care of Complicated Tasks 4   Applied Cognition Raw Score 24   Applied Cognition Standardized Score 62 21   Barthel Index   Feeding 10   Bathing 0   Grooming Score 5   Dressing Score 5   Bladder Score 10   Bowels Score 10   Toilet Use Score 10   Transfers (Bed/Chair) Score 10   Mobility (Level Surface) Score 10   Stairs Score 5   Barthel Index Score 75   End of Consult   Education Provided Yes;Family or social support of family present for education by provider   Patient Position at End of Consult Seated edge of bed; All needs within reach   Nurse Communication Nurse aware of consult       GOALS  1) Pt will improve activity tolerance to G for 30 min txment sessions  2) Pt will complete ADLs/self care w/ mod I w/ use of AD/DME as needed to increase independence in functional tasks  3) Pt will complete toileting w/ mod I w/ G hygiene/thoroughness using DME PRN  4) Pt will improve fx'l tfers on/off all surfaces using DME PRN w/ G balance/safety including toileting w/ mod I  5) Pt will improve fx'l mobility during I/ADl/leisure tasks using DME PRN w/ g balance/safety w/ mod I  6) Pt will be attentive 100% of the time during ongoing cognitive assessment w/ G participation to A w/ safe d/c planning/recommendations  7) Pt will demonstrate G carryover of pt/caregiver education and training as appropriate w/o cues w/ G tolerance to increase safety during functional tasks  8) Pt will improve UB fx'l use/ROM to [facility] and strength 1/2 MMT via AROM/AAROM/PROM in all planes as tolerated s/p skilled education of HEP w/o cues for carryover    Taylor Dubois MS, OTR/L

## 2023-06-04 NOTE — PLAN OF CARE
Problem: OCCUPATIONAL THERAPY ADULT  Goal: Performs self-care activities at highest level of function for planned discharge setting  See evaluation for individualized goals  Description: Treatment Interventions: ADL retraining, Functional transfer training, UE strengthening/ROM, Endurance training, Patient/family training, Equipment evaluation/education, Compensatory technique education, Fine motor coordination activities, Continued evaluation, Activityengagement, Energy conservation          See flowsheet documentation for full assessment, interventions and recommendations  Note: Limitation: Decreased ADL status, Decreased UE strength, Decreased endurance, Decreased self-care trans, Decreased fine motor control, Decreased high-level ADLs  Prognosis: Fair  Assessment: Pt is a 52 y/o male seen for OT eval s/p adm to [facility] w/L sided weakness, L sided facial droop and slurred peech  CTA head was negative in ED  MRI brain confirmed R pontine stroke  Pt  has a past medical history of Acute lymphangitis of left upper limb (11/14/2016), Elevated LFTs (7/31/2021), and Hypertension  Pt with active OT orders and up with assistance  orders  Pt lives with his spouse in 2 story apt w/ 2 NORRIS  Pt was I w/  ADLS and IADLS, drove, & required no use of DME PTA  Pt is currently demonstrating the following occupational deficits: S UB ADLS, Min A LB ADLS, S transfers and Min A functional mobility w/o AD  These deficits that are impacting pt's baseline areas of occupation are a result of the following impairments: endurance, activity tolerance, functional mobility, forward functional reach, balance, functional standing tolerance, decreased I w/ ADLS/IADLS, strength, decreased safety awareness and coordination deficits   The following Occupational Performance Areas to address include: bathing/shower, toilet hygiene, dressing, health maintenance, functional mobility, community mobility, clothing management, household maintenance and job performance/volunteering  Recommend inpatient rehab  upon D/C   Pt to continue to benefit from acute immediate OT services to address the following goals 3-5x/week to  w/in 10-14 days:  Recommendation: (S) [address]  OT Discharge Recommendation: Post acute rehabilitation services (if pt is denied acute rehab then recommend return home w/ OP OT)     Lydia Alves MS, OTR/L

## 2023-06-04 NOTE — PLAN OF CARE
Problem: PHYSICAL THERAPY ADULT  Goal: Performs mobility at highest level of function for planned discharge setting  See evaluation for individualized goals  Description: Treatment/Interventions: LE strengthening/ROM, Functional transfer training, Therapeutic exercise, Elevations, Endurance training, Patient/family training, Bed mobility, Gait training  Equipment Recommended:  (If patient is denied from IP rehab, recommend with outpatient PT )       See flowsheet documentation for full assessment, interventions and recommendations  Note: Prognosis: Excellent  Problem List: Decreased strength, Decreased mobility, Impaired balance, Decreased coordination  Assessment: Pt is 51 y o  male seen for high complexity PT evaluation s/p admission to [facility] on 6/2/23 for unsteady gait, L sided weakness, and slurred speech; dx with R pontine stroke  Comorbidities affecting pt's physical performance at time of assessment include: alcohol use, elevated LFT, type 2 DM, hyperlipidemia, tobacco use  PTA, pt was independent with all functional mobility without AD and worked FT  Pt resides with his wife in a 2 story apartment with 2 NORRIS and 2nd floor bed/bathroom  Currently, pt is supervision for transfers and bed mobility; required min A for ambulation and stairs  He demonstrates mild L sided weakness but occasional L foot drag during ambulation without AD  On the steps, he had 1 LOB during step to decent pattern which required mod A x 1 to recover LOB  Patient's decreased mobility level and increased fall risk is secondary to deficits in L sided weakness, coordination, motor planning, gait dysfunction, higher level balance, and elevations  Current clinical presentation is unstable/unpredictable seen in pt's presentation of ongoing medical management, increased reliance on assistance compared to PLOF, impaired judgement/safety awareness, and significant PMH   Pt to benefit from continued PT tx to address deficits as defined above and maximize level of functional independent mobility and consistency  From PT/mobility standpoint, recommendation at time of d/c would be acute inpatient rehab (recommend PMR consult)  If patient is denied from IP rehab, recommend with outpatient PT  Barriers to Discharge: Inaccessible home environment     PT Discharge Recommendation: Post acute rehabilitation services ((PMR consult))    See flowsheet documentation for full assessment

## 2023-06-04 NOTE — CASE MANAGEMENT
Case Management Discharge Planning Note    Patient name Alexandra Peguero  [facility] MRN [id number]  : 1975 Date 2023       Current Admission Date: 2023  Current Admission Diagnosis:Right pontine stroke [facility])   Patient Active Problem List    Diagnosis Date Noted   • Alcohol use disorder 2023   • [facility] pont[facility] ([facility] ) 2023   • Tobacco use 2023   • Fatty liver 2022   • Alcohol use disorder, moderate, in early remission ([facility] ) 2021   • Asymptomatic gallstones 2021   • Mixed hyperlipidemia 2021   • Alcohol withdrawal seizure ([place] [address] ) 2021   • Type 2 diabetes mellitus without complication, without long-term current use of insulin ([place] [address] ) 2021   • Elevated LFTs 2021   • Essential hypertension 2016      LOS (days): 1  Geometric Mean LOS (GMLOS) (days):   Days to GMLOS:     OBJECTIVE:  Risk of Unplanned Readmission Score: 8 83     Current admission status: Inpatient   Preferred Pharmacy:   [address] [place] [id number]  Phone: [phone] Fax: [phone]    Primary Care Provider: Zoie Garza MD    Primary Insurance:   Secondary Insurance:     DISCHARGE DETAILS:    Discharge planning discussed with[de-identified] Pt  Freedom of Choice: Yes   Comments - Freedom of Choice: therapy recommendations discussed, Pt interested in referral to [facility]  Were Treatment Team discharge recommendations reviewed with patient/caregiver?: Yes  Did patient/caregiver verbalize understanding of patient care needs?: N/A- going to facility  Were patient/caregiver advised of the risks associated with not following Treatment Team discharge recommendations?: Yes    Other Referral/Resources/Interventions Provided:  Interventions: Acute Rehab  Referral Comments: Referral placed to [facility] per pt request  CM will follow for final determination      Treatment Team Recommendation: Short Term Rehab  Discharge Destination Plan[de-identified] Short Term Rehab, Acute Rehab

## 2023-06-04 NOTE — PLAN OF CARE
Problem: Neurological Deficit  Goal: Neurological status is stable or improving  Description: Interventions:  - Monitor and assess patient's level of consciousness, motor function, sensory function, and level of assistance needed for ADLs  - Monitor and report changes from baseline  Collaborate with interdisciplinary team to initiate plan and implement interventions as ordered  - Provide and maintain a safe environment  - Consider seizure precautions  - Consider fall precautions  - Consider aspiration precautions  - Consider bleeding precautions  Outcome: Progressing     Problem: Activity Intolerance/Impaired Mobility  Goal: Mobility/activity is maintained at optimum level for patient  Description: Interventions:  - Assess and monitor patient  barriers to mobility and need for assistive/adaptive devices  - Assess patient's emotional response to limitations  - Collaborate with interdisciplinary team and initiate plans and interventions as ordered  - Encourage independent activity per ability   - Maintain proper body alignment  - Perform active/passive rom as tolerated/ordered  - Plan activities to conserve energy   - Turn patient as appropriate  Outcome: Progressing     Problem: Communication Impairment  Goal: Ability to express needs and understand communication  Description: Assess patient's communication skills and ability to understand information  Patient will demonstrate use of effective communication techniques, alternative methods of communication and understanding even if not able to speak  - Encourage communication and provide alternate methods of communication as needed  - Collaborate with case management/ for discharge needs  - Include patient/family/caregiver in decisions related to communication    Outcome: Progressing     Problem: Potential for Aspiration  Goal: Non-ventilated patient's risk of aspiration is minimized  Description: Assess and monitor vital signs, respiratory status, and labs (WBC)  Monitor for signs of aspiration (tachypnea, cough, rales, wheezing, cyanosis, fever)  - Assess and monitor patient's ability to swallow  - Place patient up in chair to eat if possible  - HOB up at 90 degrees to eat if unable to get patient up into chair   - Supervise patient during oral intake  - Instruct patient/ family to take small bites  - Instruct patient/ family to take small single sips when taking liquids  - Follow patient-specific strategies generated by speech pathologist   Outcome: Progressing  Goal: Ventilated patient's risk of aspiration is minimized  Description: Assess and monitor vital signs, respiratory status, airway cuff pressure, and labs (WBC)  Monitor for signs of aspiration (tachypnea, cough, rales, wheezing, cyanosis, fever)  - Elevate head of bed 30 degrees if patient has tube feeding   - Monitor tube feeding  Outcome: Progressing     Problem: Nutrition  Goal: Nutrition/Hydration status is improving  Description: Monitor and assess patient's nutrition/hydration status for malnutrition (ex- brittle hair, bruises, dry skin, pale skin and conjunctiva, muscle wasting, smooth red tongue, and disorientation)  Collaborate with interdisciplinary team and initiate plan and interventions as ordered  Monitor patient's weight and dietary intake as ordered or per policy  Utilize nutrition screening tool and intervene per policy  Determine patient's food preferences and provide high-protein, high-caloric foods as appropriate  - Assist patient with eating   - Allow adequate time for meals   - Encourage patient to take dietary supplement as ordered  - Collaborate with clinical nutritionist   - Include patient/family/caregiver in decisions related to nutrition    Outcome: Progressing     Problem: PAIN - ADULT  Goal: Verbalizes/displays adequate comfort level or baseline comfort level  Description: Interventions:  - Encourage patient to monitor pain and request assistance  - Assess pain using appropriate pain scale  - Administer analgesics based on type and severity of pain and evaluate response  - Implement non-pharmacological measures as appropriate and evaluate response  - Consider cultural and social influences on pain and pain management  - Notify physician/advanced practitioner if interventions unsuccessful or patient reports new pain  Outcome: Progressing     Problem: INFECTION - ADULT  Goal: Absence or prevention of progression during hospitalization  Description: INTERVENTIONS:  - Assess and monitor for signs and symptoms of infection  - Monitor lab/diagnostic results  - Monitor all insertion sites, i e  indwelling lines, tubes, and drains  - Monitor endotracheal if appropriate and nasal secretions for changes in amount and color  - [place] appropriate cooling/warming therapies per order  - Administer medications as ordered  - Instruct and encourage patient and family to use good hand hygiene technique  - Identify and instruct in appropriate isolation precautions for identified infection/condition  Outcome: Progressing  Goal: Absence of fever/infection during neutropenic period  Description: INTERVENTIONS:  - Monitor WBC    Outcome: Progressing     Problem: SAFETY ADULT  Goal: Patient will remain free of falls  Description: INTERVENTIONS:  - Educate patient/family on patient safety including physical limitations  - Instruct patient to call for assistance with activity   - Consult OT/PT to assist with strengthening/mobility   - Keep Call bell within reach  - Keep bed low and locked with side rails adjusted as appropriate  - Keep care items and personal belongings within reach  - Initiate and maintain comfort rounds  - Make Fall Risk Sign visible to staff  - Offer Toileting every 2 Hours, in advance of need  - Initiate/Maintain bed alarm  - Apply yellow socks and bracelet for high fall risk patients  - Consider moving patient to room near nurses station  Outcome: Progressing  Goal: Maintain or return to baseline ADL function  Description: INTERVENTIONS:  -  Assess patient's ability to carry out ADLs; assess patient's baseline for ADL function and identify physical deficits which impact ability to perform ADLs (bathing, care of mouth/teeth, toileting, grooming, dressing, etc )  - Assess/evaluate cause of self-care deficits   - Assess range of motion  - Assess patient's mobility; develop plan if impaired  - Assess patient's need for assistive devices and provide as appropriate  - Encourage maximum independence but intervene and supervise when necessary  - Involve family in performance of ADLs  - Assess for home care needs following discharge   - Consider OT consult to assist with ADL evaluation and planning for discharge  - Provide patient education as appropriate  Outcome: Progressing  Goal: Maintains/Returns to pre admission functional level  Description: INTERVENTIONS:  - Perform BMAT or MOVE assessment daily    - Set and communicate daily mobility goal to care team and patient/family/caregiver  - Collaborate with rehabilitation services on mobility goals if consulted  - Perform Range of Motion 2 times a day  - Reposition patient every 2 hours    - Dangle patient 2 times a day  - Stand patient 2 times a day  - Ambulate patient 2 times a day  - Out of bed to chair 2 times a day   - Out of bed for meals 22 times a day  - Out of bed for toileting  - Record patient progress and toleration of activity level   Outcome: Progressing     Problem: DISCHARGE PLANNING  Goal: Discharge to home or other facility with appropriate resources  Description: INTERVENTIONS:  - Identify barriers to discharge w/patient and caregiver  - Arrange for needed discharge resources and transportation as appropriate  - Identify discharge learning needs (meds, wound care, etc )  - Arrange for interpretive services to assist at discharge as needed  - Refer to Case Management Department for coordinating discharge planning if the patient needs post-hospital services based on physician/advanced practitioner order or complex needs related to functional status, cognitive ability, or social support system  Outcome: Progressing     Problem: Knowledge Deficit  Goal: Patient/family/caregiver demonstrates understanding of disease process, treatment plan, medications, and discharge instructions  Description: Complete learning assessment and assess knowledge base    Interventions:  - Provide teaching at level of understanding  - Provide teaching via preferred learning methods  Outcome: Progressing

## 2023-06-05 ENCOUNTER — DOCUMENTATION (OUTPATIENT)
Dept: NEUROLOGY | Facility: CLINIC | Age: 48
End: 2023-06-05

## 2023-06-05 LAB
GLUCOSE SERPL-MCNC: 108 MG/DL (ref 65–140)
GLUCOSE SERPL-MCNC: 118 MG/DL (ref 65–140)
GLUCOSE SERPL-MCNC: 129 MG/DL (ref 65–140)
GLUCOSE SERPL-MCNC: 162 MG/DL (ref 65–140)

## 2023-06-05 PROCEDURE — 92522 EVALUATE SPEECH PRODUCTION: CPT

## 2023-06-05 PROCEDURE — 97110 THERAPEUTIC EXERCISES: CPT

## 2023-06-05 PROCEDURE — 97116 GAIT TRAINING THERAPY: CPT

## 2023-06-05 PROCEDURE — 99231 SBSQ HOSP IP/OBS SF/LOW 25: CPT | Performed by: PHYSICIAN ASSISTANT

## 2023-06-05 PROCEDURE — 82948 REAGENT STRIP/BLOOD GLUCOSE: CPT

## 2023-06-05 RX ADMIN — METHOCARBAMOL TABLETS 500 MG: 500 TABLET, COATED ORAL at 08:43

## 2023-06-05 RX ADMIN — ASPIRIN 81 MG CHEWABLE TABLET 81 MG: 81 TABLET CHEWABLE at 08:39

## 2023-06-05 RX ADMIN — CLOPIDOGREL BISULFATE 75 MG: 75 TABLET ORAL at 08:39

## 2023-06-05 RX ADMIN — METHOCARBAMOL TABLETS 500 MG: 500 TABLET, COATED ORAL at 00:34

## 2023-06-05 RX ADMIN — THIAMINE HCL TAB 100 MG 100 MG: 100 TAB at 08:39

## 2023-06-05 RX ADMIN — HEPARIN SODIUM 5000 UNITS: 5000 INJECTION INTRAVENOUS; SUBCUTANEOUS at 05:52

## 2023-06-05 RX ADMIN — NICOTINE 14 MG: 14 PATCH, EXTENDED RELEASE TRANSDERMAL at 08:40

## 2023-06-05 RX ADMIN — FOLIC ACID 1 MG: 1 TABLET ORAL at 08:39

## 2023-06-05 RX ADMIN — METHOCARBAMOL TABLETS 500 MG: 500 TABLET, COATED ORAL at 14:40

## 2023-06-05 RX ADMIN — Medication 1 TABLET: at 08:39

## 2023-06-05 RX ADMIN — HEPARIN SODIUM 5000 UNITS: 5000 INJECTION INTRAVENOUS; SUBCUTANEOUS at 23:16

## 2023-06-05 RX ADMIN — METHOCARBAMOL TABLETS 500 MG: 500 TABLET, COATED ORAL at 23:16

## 2023-06-05 RX ADMIN — INSULIN LISPRO 1 UNITS: 100 INJECTION, SOLUTION INTRAVENOUS; SUBCUTANEOUS at 23:16

## 2023-06-05 RX ADMIN — HEPARIN SODIUM 5000 UNITS: 5000 INJECTION INTRAVENOUS; SUBCUTANEOUS at 13:33

## 2023-06-05 NOTE — PROGRESS NOTES
Met with patient at bedside in room 727  Patient's spouse was present  Introduced my role  Provided stroke education including the stroke education booklet  Reviewed the importance of following up with PCP  Offered to schedule stroke hospital follow up appointment  Patient declined at this time since he prefers to schedule the appointment in the outpatient setting  Patient is agreeable to outreach phone calls from this RN after discharge  Denies any questions, concerns, or immediate needs at this time  Patient was appreciative for the visit

## 2023-06-05 NOTE — RESTORATIVE TECHNICIAN NOTE
Restorative Technician Note      Patient Name: Rad Acosta     Note Type: Mobility  Patient Position Upon Consult: Supine  Activity Performed: Ambulated; Dangled; Stood  Assistive Device: Other (Comment) (none)  Education Provided: Yes  Patient Position at End of Consult: Supine;  All needs within reach; Bed/Chair alarm activated  Synthia Collet BS, Restorative Technician, [employer]

## 2023-06-05 NOTE — PROGRESS NOTES
[facility]  Progress Note  Name: Jarad Wesley  MRN: [id number]  [facility] Date of Admission: 6/2/2023   Date of Service: 6/5/2023 I Hospital Day: 2    Assessment/Plan   Tobacco use  Assessment & Plan  Smokes appx 10 cigarettes a day  · NRT    Alcohol use disorder  Assessment & Plan  Last drink x10 days ago  · Continue folic acid, thiamine and MV supplementation  · CIWA protocol  · Encourage cessation  · No signs of withdrawal at this time    Mixed hyperlipidemia  Assessment & Plan  Of note, has declined cholesterol lowering medications in the past  · Statin discontinued by neurology as could contribute to increased ETOH intake  · Lipid panel reviewed, total cholesterol and triglycerides elevated but LDL < 100  Elevated LFTs  Assessment & Plan  Appears chronic upon chart review, likely in the setting of ETOH use  · RUQ US in 2021 showed hepatomegaly and moderate steatosis  · Can trend PRN  · Should follow up with GI outpatient as he is at high risk for developing cirrhosis    Type 2 diabetes mellitus without complication, without long-term current use of insulin (HCC)  Assessment & Plan  Lab Results   Component Value Date    HGBA1C 6 9 (H) 06/02/2023        · A1C 6 9  Non-compliant with metformin as an outpatient; would resume on discharge  · SSI  · Diabetic diet  · Hypoglycemia protocol    * Right pontine stroke [facility])  Assessment & Plan  Reports to the ED with slurred speech, unsteady gait and L facial droop x1 day  · CTA head/neck negative  · MRI brain showed Acute/early subacute lacunar infarct in the right colin  No acute hemorrhage or mass effect  Nonacute lacunar infarct in the left cerebellum is new since 2021  · Seen by neurology, likely small vessel as etiology  Plan to continue ASA/Plavix x 21 days then ASA monotherapy     · Echo without thrombus noted  · Telemetry monitoring without significant arrhtyhmias  · Outpatient neurology follow up  · PT/OT recommending rehab, would be good AR[facility] candidate  CM made aware  Patient/wife agreeable  · Medically clear at this time for discharge, pending rehab still           VTE Pharmacologic Prophylaxis: VTE Score: 9 High Risk (Score >/= 5) - Pharmacological DVT Prophylaxis Ordered: heparin  Sequential Compression Devices Ordered  Patient Centered Rounds: I performed bedside rounds with nursing staff today  Discussions with Specialists or Other Care Team Provider: case management    Education and Discussions with Family / Patient: Patient declined call to   Total Time Spent on Date of Encounter in care of patient: 25 minutes This time was spent on one or more of the following: performing physical exam; counseling and coordination of care; obtaining or reviewing history; documenting in the medical record; reviewing/ordering tests, medications or procedures; communicating with other healthcare professionals and discussing with patient's family/caregivers  Current Length of Stay: 2 day(s)  Current Patient Status: Inpatient   Certification Statement: The patient will continue to require additional inpatient hospital stay due to placement needed  Discharge Plan: Anticipate discharge later today or tomorrow to rehab facility  Code Status: Level 1 - Full Code    Subjective:   Patient denies any complaints  He has not noted any new neurologic deficits  Not in any acute distress laying in bed    Objective:     Vitals:   Temp (24hrs), Av °F (36 7 °C), Min:97 5 °F (36 4 °C), Max:98 3 °F (36 8 °C)    Temp:  [97 5 °F (36 4 °C)-98 3 °F (36 8 °C)] 97 5 °F (36 4 °C)  HR:  [54-62] 62  Resp:  [16] 16  BP: (111-144)/(56-82) 144/82  SpO2:  [96 %-100 %] 97 %  Body mass index is 31 12 kg/m²  Input and Output Summary (last 24 hours):      Intake/Output Summary (Last 24 hours) at 2023 1218  Last data filed at 2023  Gross per 24 hour   Intake 350 ml   Output --   Net 350 ml       Physical Exam:   Physical Exam  Vitals and nursing note reviewed  Constitutional:       General: He is not in acute distress  Appearance: He is obese  He is ill-appearing  HENT:      Head: Normocephalic and atraumatic  Cardiovascular:      Rate and Rhythm: Normal rate and regular rhythm  Pulses: Normal pulses  Heart sounds: Normal heart sounds  No murmur heard  No gallop  Pulmonary:      Effort: Pulmonary effort is normal       Breath sounds: Normal breath sounds  No wheezing, rhonchi or rales  Abdominal:      General: Bowel sounds are normal       Palpations: Abdomen is soft  Tenderness: There is no abdominal tenderness  There is no guarding or rebound  Musculoskeletal:      Cervical back: Normal range of motion and neck supple  Right lower leg: No edema  Left lower leg: No edema  Skin:     General: Skin is warm and dry  Neurological:      Mental Status: He is alert and oriented to person, place, and time     Psychiatric:         Mood and Affect: Mood normal          Behavior: Behavior normal           Additional Data:     Labs:  Results from last 7 days   Lab Units 06/03/23  0416 06/02/23  2131   EOS PCT %  --  1   HEMATOCRIT % 41 2 43 5   HEMOGLOBIN g/dL 14 7 16 0   LYMPHS PCT %  --  26   MONOS PCT %  --  10   NEUTROS PCT %  --  61   PLATELETS Thousands/uL 165 183   WBC Thousand/uL 6 76 8 23     Results from last 7 days   Lab Units 06/03/23  0416   ANION GAP mmol/L 4   ALBUMIN g/dL 3 5   ALK PHOS U/L 151*   ALT U/L 133*   AST U/L 106*   BUN mg/dL 16   CALCIUM mg/dL 9 6   CHLORIDE mmol/L 105   CO2 mmol/L 25   CREATININE mg/dL 0 88   GLUCOSE RANDOM mg/dL 268*   POTASSIUM mmol/L 4 1   SODIUM mmol/L 134*   TOTAL BILIRUBIN mg/dL 0 74     Results from last 7 days   Lab Units 06/02/23  2131   INR  0 96     Results from last 7 days   Lab Units 06/05/23  1047 06/05/23  0555 06/04/23  2046 06/04/23  1539 06/04/23  1035 06/04/23  0640 06/03/23  2125 06/03/23  1604 06/03/23  1105   POC GLUCOSE mg/dl 108 118 160* 138 155* 120 199* 184* 135     Results from last 7 days   Lab Units 23  2131   HEMOGLOBIN A1C % 6 9*           Lines/Drains:  Invasive Devices     Peripheral Intravenous Line  Duration           Peripheral IV 23 Left Antecubital 2 days                  Telemetry:  Telemetry Orders (From admission, onward)             24 Hour Telemetry Monitoring  Continuous x 24 Hours (Telem)           Question:  Reason for 24 Hour Telemetry  Answer:  TIA/Suspected CVA/ Confirmed CVA                 Telemetry Reviewed: Normal Sinus Rhythm  Indication for Continued Telemetry Use: No indication for continued use  Will discontinue  Imaging: No pertinent imaging reviewed  Recent Cultures (last 7 days):         Last 24 Hours Medication List:   Current Facility-Administered Medications   Medication Dose Route Frequency Provider Last Rate   • aspirin  81 mg Oral Daily INESSA Caldwell     • clopidogrel  75 mg Oral Daily Brooke Manuel PA-C     • folic acid  1 mg Oral Daily INESSA Caldwell     • heparin (porcine)  5,000 Units Subcutaneous [facility] Oneal Villasenor PA-C     • insulin lispro  1-5 Units Subcutaneous HS INESSA Caldwell     • insulin lispro  1-6 Units Subcutaneous TID AC INESSA Caldwell     • methocarbamol  500 mg Oral Q6H PRN Amber Theador Boeck, CRNP     • multivitamin-minerals  1 tablet Oral Daily INESSA Caldwell     • nicotine  14 mg Transdermal Daily Kristen Villalobos PA-C     • thiamine  100 mg Oral Daily INESSA Caldwell          Today, Patient Was Seen By: Randi Joseph PA-C    **Please Note: This note may have been constructed using a voice recognition system  **

## 2023-06-05 NOTE — PLAN OF CARE
Problem: Neurological Deficit  Goal: Neurological status is stable or improving  Description: Interventions:  - Monitor and assess patient's level of consciousness, motor function, sensory function, and level of assistance needed for ADLs  - Monitor and report changes from baseline  Collaborate with interdisciplinary team to initiate plan and implement interventions as ordered  - Provide and maintain a safe environment  - Consider seizure precautions  - Consider fall precautions  - Consider aspiration precautions  - Consider bleeding precautions  Outcome: Progressing     Problem: Activity Intolerance/Impaired Mobility  Goal: Mobility/activity is maintained at optimum level for patient  Description: Interventions:  - Assess and monitor patient  barriers to mobility and need for assistive/adaptive devices  - Assess patient's emotional response to limitations  - Collaborate with interdisciplinary team and initiate plans and interventions as ordered  - Encourage independent activity per ability   - Maintain proper body alignment  - Perform active/passive rom as tolerated/ordered  - Plan activities to conserve energy   - Turn patient as appropriate  Outcome: Progressing     Problem: Communication Impairment  Goal: Ability to express needs and understand communication  Description: Assess patient's communication skills and ability to understand information  Patient will demonstrate use of effective communication techniques, alternative methods of communication and understanding even if not able to speak  - Encourage communication and provide alternate methods of communication as needed  - Collaborate with case management/ for discharge needs  - Include patient/family/caregiver in decisions related to communication    Outcome: Progressing     Problem: Potential for Aspiration  Goal: Non-ventilated patient's risk of aspiration is minimized  Description: Assess and monitor vital signs, respiratory status, and labs (WBC)  Monitor for signs of aspiration (tachypnea, cough, rales, wheezing, cyanosis, fever)  - Assess and monitor patient's ability to swallow  - Place patient up in chair to eat if possible  - HOB up at 90 degrees to eat if unable to get patient up into chair   - Supervise patient during oral intake  - Instruct patient/ family to take small bites  - Instruct patient/ family to take small single sips when taking liquids  - Follow patient-specific strategies generated by speech pathologist   Outcome: Progressing  Goal: Ventilated patient's risk of aspiration is minimized  Description: Assess and monitor vital signs, respiratory status, airway cuff pressure, and labs (WBC)  Monitor for signs of aspiration (tachypnea, cough, rales, wheezing, cyanosis, fever)  - Elevate head of bed 30 degrees if patient has tube feeding   - Monitor tube feeding  Outcome: Progressing     Problem: Nutrition  Goal: Nutrition/Hydration status is improving  Description: Monitor and assess patient's nutrition/hydration status for malnutrition (ex- brittle hair, bruises, dry skin, pale skin and conjunctiva, muscle wasting, smooth red tongue, and disorientation)  Collaborate with interdisciplinary team and initiate plan and interventions as ordered  Monitor patient's weight and dietary intake as ordered or per policy  Utilize nutrition screening tool and intervene per policy  Determine patient's food preferences and provide high-protein, high-caloric foods as appropriate  - Assist patient with eating   - Allow adequate time for meals   - Encourage patient to take dietary supplement as ordered  - Collaborate with clinical nutritionist   - Include patient/family/caregiver in decisions related to nutrition    Outcome: Progressing     Problem: PAIN - ADULT  Goal: Verbalizes/displays adequate comfort level or baseline comfort level  Description: Interventions:  - Encourage patient to monitor pain and request assistance  - Assess pain using appropriate pain scale  - Administer analgesics based on type and severity of pain and evaluate response  - Implement non-pharmacological measures as appropriate and evaluate response  - Consider cultural and social influences on pain and pain management  - Notify physician/advanced practitioner if interventions unsuccessful or patient reports new pain  Outcome: Progressing     Problem: INFECTION - ADULT  Goal: Absence or prevention of progression during hospitalization  Description: INTERVENTIONS:  - Assess and monitor for signs and symptoms of infection  - Monitor lab/diagnostic results  - Monitor all insertion sites, i e  indwelling lines, tubes, and drains  - Monitor endotracheal if appropriate and nasal secretions for changes in amount and color  - [place] appropriate cooling/warming therapies per order  - Administer medications as ordered  - Instruct and encourage patient and family to use good hand hygiene technique  - Identify and instruct in appropriate isolation precautions for identified infection/condition  Outcome: Progressing  Goal: Absence of fever/infection during neutropenic period  Description: INTERVENTIONS:  - Monitor WBC    Outcome: Progressing

## 2023-06-05 NOTE — PLAN OF CARE
Problem: Neurological Deficit  Goal: Neurological status is stable or improving  Description: Interventions:  - Monitor and assess patient's level of consciousness, motor function, sensory function, and level of assistance needed for ADLs  - Monitor and report changes from baseline  Collaborate with interdisciplinary team to initiate plan and implement interventions as ordered  - Provide and maintain a safe environment  - Consider seizure precautions  - Consider fall precautions  - Consider aspiration precautions  - Consider bleeding precautions  Outcome: Progressing     Problem: Activity Intolerance/Impaired Mobility  Goal: Mobility/activity is maintained at optimum level for patient  Description: Interventions:  - Assess and monitor patient  barriers to mobility and need for assistive/adaptive devices  - Assess patient's emotional response to limitations  - Collaborate with interdisciplinary team and initiate plans and interventions as ordered  - Encourage independent activity per ability   - Maintain proper body alignment  - Perform active/passive rom as tolerated/ordered  - Plan activities to conserve energy   - Turn patient as appropriate  Outcome: Progressing     Problem: Communication Impairment  Goal: Ability to express needs and understand communication  Description: Assess patient's communication skills and ability to understand information  Patient will demonstrate use of effective communication techniques, alternative methods of communication and understanding even if not able to speak  - Encourage communication and provide alternate methods of communication as needed  - Collaborate with case management/ for discharge needs  - Include patient/family/caregiver in decisions related to communication    Outcome: Progressing     Problem: Potential for Aspiration  Goal: Non-ventilated patient's risk of aspiration is minimized  Description: Assess and monitor vital signs, respiratory status, and labs (WBC)  Monitor for signs of aspiration (tachypnea, cough, rales, wheezing, cyanosis, fever)  - Assess and monitor patient's ability to swallow  - Place patient up in chair to eat if possible  - HOB up at 90 degrees to eat if unable to get patient up into chair   - Supervise patient during oral intake  - Instruct patient/ family to take small bites  - Instruct patient/ family to take small single sips when taking liquids  - Follow patient-specific strategies generated by speech pathologist   Outcome: Progressing  Goal: Ventilated patient's risk of aspiration is minimized  Description: Assess and monitor vital signs, respiratory status, airway cuff pressure, and labs (WBC)  Monitor for signs of aspiration (tachypnea, cough, rales, wheezing, cyanosis, fever)  - Elevate head of bed 30 degrees if patient has tube feeding   - Monitor tube feeding  Outcome: Progressing     Problem: Nutrition  Goal: Nutrition/Hydration status is improving  Description: Monitor and assess patient's nutrition/hydration status for malnutrition (ex- brittle hair, bruises, dry skin, pale skin and conjunctiva, muscle wasting, smooth red tongue, and disorientation)  Collaborate with interdisciplinary team and initiate plan and interventions as ordered  Monitor patient's weight and dietary intake as ordered or per policy  Utilize nutrition screening tool and intervene per policy  Determine patient's food preferences and provide high-protein, high-caloric foods as appropriate  - Assist patient with eating   - Allow adequate time for meals   - Encourage patient to take dietary supplement as ordered  - Collaborate with clinical nutritionist   - Include patient/family/caregiver in decisions related to nutrition    Outcome: Progressing     Problem: PAIN - ADULT  Goal: Verbalizes/displays adequate comfort level or baseline comfort level  Description: Interventions:  - Encourage patient to monitor pain and request assistance  - Assess pain using appropriate pain scale  - Administer analgesics based on type and severity of pain and evaluate response  - Implement non-pharmacological measures as appropriate and evaluate response  - Consider cultural and social influences on pain and pain management  - Notify physician/advanced practitioner if interventions unsuccessful or patient reports new pain  Outcome: Progressing     Problem: INFECTION - ADULT  Goal: Absence or prevention of progression during hospitalization  Description: INTERVENTIONS:  - Assess and monitor for signs and symptoms of infection  - Monitor lab/diagnostic results  - Monitor all insertion sites, i e  indwelling lines, tubes, and drains  - Monitor endotracheal if appropriate and nasal secretions for changes in amount and color  - [place] appropriate cooling/warming therapies per order  - Administer medications as ordered  - Instruct and encourage patient and family to use good hand hygiene technique  - Identify and instruct in appropriate isolation precautions for identified infection/condition  Outcome: Progressing  Goal: Absence of fever/infection during neutropenic period  Description: INTERVENTIONS:  - Monitor WBC    Outcome: Progressing     Problem: SAFETY ADULT  Goal: Patient will remain free of falls  Description: INTERVENTIONS:  - Educate patient/family on patient safety including physical limitations  - Instruct patient to call for assistance with activity   - Consult OT/PT to assist with strengthening/mobility   - Keep Call bell within reach  - Keep bed low and locked with side rails adjusted as appropriate  - Keep care items and personal belongings within reach  - Initiate and maintain comfort rounds        - Consider moving patient to room near nurses station  Outcome: Progressing  Goal: Maintain or return to baseline ADL function  Description: INTERVENTIONS:  -  Assess patient's ability to carry out ADLs; assess patient's baseline for ADL function and identify physical deficits which impact ability to perform ADLs (bathing, care of mouth/teeth, toileting, grooming, dressing, etc )  - Assess/evaluate cause of self-care deficits   - Assess range of motion  - Assess patient's mobility; develop plan if impaired  - Assess patient's need for assistive devices and provide as appropriate  - Encourage maximum independence but intervene and supervise when necessary  - Involve family in performance of ADLs  - Assess for home care needs following discharge   - Consider OT consult to assist with ADL evaluation and planning for discharge  - Provide patient education as appropriate  Outcome: Progressing  Goal: Maintains/Returns to pre admission functional level  Description: INTERVENTIONS:  - Perform BMAT or MOVE assessment daily    - Set and communicate daily mobility goal to care team and patient/family/caregiver     - Collaborate with rehabilitation services on mobility goals if consulted  - Ambulate patient 2 times a day  - Out of bed for toileting  - Record patient progress and toleration of activity level   Outcome: Progressing     Problem: DISCHARGE PLANNING  Goal: Discharge to home or other facility with appropriate resources  Description: INTERVENTIONS:  - Identify barriers to discharge w/patient and caregiver  - Arrange for needed discharge resources and transportation as appropriate  - Identify discharge learning needs (meds, wound care, etc )  - Arrange for interpretive services to assist at discharge as needed  - Refer to Case Management Department for coordinating discharge planning if the patient needs post-hospital services based on physician/advanced practitioner order or complex needs related to functional status, cognitive ability, or social support system  Outcome: Progressing     Problem: Knowledge Deficit  Goal: Patient/family/caregiver demonstrates understanding of disease process, treatment plan, medications, and discharge instructions  Description: Complete learning assessment and assess knowledge base    Interventions:  - Provide teaching at level of understanding  - Provide teaching via preferred learning methods  Outcome: Progressing

## 2023-06-05 NOTE — ASSESSMENT & PLAN NOTE
Last drink x10 days ago  · Continue folic acid, thiamine and MV supplementation  · CIWA protocol  · Encourage cessation  · No signs of withdrawal at this time

## 2023-06-05 NOTE — PHYSICAL THERAPY NOTE
Physical Therapy Treatment Note    Patient's Name: Aide Velez  : 1975     06/05/23 1413   PT Last Visit   PT Visit Date 23   Note Type   Note Type Treatment   Pain Assessment   Pain Assessment Tool 0-10   Pain Score No Pain   Restrictions/Precautions   Weight Bearing Precautions Per Order No   Other Precautions Fall Risk  (L-sided weakness)   General   Chart Reviewed Yes   Family/Caregiver Present No   Subjective   Subjective Pt agreeable to mobilize, hesitant to do the stairs but agreeable w/ encouragement  Bed Mobility   Supine to Sit Unable to assess   Sit to Supine Unable to assess   Additional Comments Pt greeted seated EOB  Transfers   Sit to Stand 5  Supervision   Additional items Increased time required   Stand to Sit 5  Supervision   Additional items Increased time required   Additional Comments no AD   Ambulation/Elevation   Gait pattern Excessively slow; Short stride  (steppage LLE, increased lateral sway)   Gait Assistance 5  Supervision   Additional items Verbal cues; Tactile cues   Assistive Device None   Distance 90'x2   Stair Management Assistance 4  Minimal assist   Additional items Assist x 1;Verbal cues; Tactile cues  (cues for sequencing)   Stair Management Technique One rail R;Nonreciprocal   Number of Stairs 9   Balance   Static Sitting Good   Dynamic Sitting Fair +   Static Standing Fair   Dynamic Standing Fair -   Ambulatory Fair -   Endurance Deficit   Endurance Deficit Yes   Endurance Deficit Description gait quality degradation w/ fatigue   Activity Tolerance   Activity Tolerance Patient limited by fatigue   Exercises   Ankle Pumps   (encouraged pt to perform DF AROM as part of HEP)   Assessment   Prognosis Excellent   Problem List Decreased strength;Decreased mobility; Impaired balance;Decreased coordination;Decreased endurance   Assessment Pt seen for PT treatment session w/ focus on gait training, stair training, HEP instruction   Pt demonstrated good progress this session w/ increased gait distance covered + improved ability to clear LLE (although w/ compensatory strategies - steppage gait pattern)  Pt w/ 3+/5 strength L anterior tibialis on MMT  Encouraged L ankle DF AROM as part of HEP  As pt is still functioning below his independent baseline, recommend acute medical rehab upon d/c    Barriers to Discharge Inaccessible home environment   Goals   Patient Goals get stronger   PT Treatment Day 1   Plan   Treatment/Interventions Functional transfer training;LE strengthening/ROM; Elevations; Therapeutic exercise; Endurance training;Patient/family training;Equipment eval/education;Gait training; Compensatory technique education  (balance training)   Progress Progressing toward goals   PT Frequency 4-6x/wk   Recommendation   PT Discharge Recommendation Post acute rehabilitation services  (acute medical rehab)   AM-PAC Basic Mobility Inpatient   Turning in Flat Bed Without Bedrails 4   Lying on Back to Sitting on Edge of Flat Bed Without Bedrails 4   Moving Bed to Chair 3   Standing Up From Chair Using Arms 4   Walk in Room 3   Climb 3-5 Stairs With Railing 3   Basic Mobility Inpatient Raw Score 21   Basic Mobility Standardized Score 45 55   Highest Level Of Mobility   JH-HLM Goal 6: Walk 10 steps or more   JH-HLM Achieved 7: Walk 25 feet or more   Education   Education Provided Mobility training   Patient Demonstrates acceptance/verbal understanding;Reinforcement needed   End of Consult   Patient Position at End of Consult Seated edge of bed; All needs within reach  (speech therapy at bedside)       Kermit Smith, PT, DPT

## 2023-06-05 NOTE — UTILIZATION REVIEW
Initial Clinical Review    Admission: Date/Time/Statement:   Admission Orders (From admission, onward)     Ordered        06/03/23 0032  INPATIENT ADMISSION  Once                      Orders Placed This Encounter   Procedures   • INPATIENT ADMISSION     Standing Status:   Standing     Number of Occurrences:   1     Order Specific Question:   Level of Care     Answer:   Med Surg [16]     Order Specific Question:   Estimated length of stay     Answer:   More than 2 Midnights     Order Specific Question:   Certification     Answer:   I certify that inpatient services are medically necessary for this patient for a duration of greater than two midnights  See H&P and MD Progress Notes for additional information about the patient's course of treatment  ED Arrival Information     Expected   -    Arrival   6/2/2023 21:03    Acuity   Emergent            Means of arrival   Walk-In    Escorted by   Family Member    Service   Hospitalist    Admission type   Emergency            Arrival complaint   -           Chief Complaint   Patient presents with   • Slurred Speech     Patient started with slurred speech yesterday  Unstable gait and and weakness around 5 pm yesterday but did not want to come to doctor  Worked through his shft and was brought in to hospital by family  Still believe speech is slurred now  Pt recently also stopped rinking last week, last time he stopped, had a seizure       Initial Presentation: 51 y o  male presents to ed from home for evaluation and treatment of slurred speech which began at work today  Patient reports he has been leaning to the left  3 am he began to notice difficulty speaking which family noticed when he got home  PMHX: CVA, ETOH ABUSE, HTN   Clinical assessment significant for hypertension, L lip droop/ smile, slurred speech  NIHHss =2  LFTs elevated  Imaging shows an acute lacunar infarct in right colin  He stopped heaving drinking 1 week ago   Initially treated with iv thiamine, iv folic acid, multivitamin, aspirin  Admit to inpatient med surg for right pontine stroke  Plan includes:   neuro checksm telemetry, PT/OT/ ST evaluations, Echo, serial Ciwa assessments  Date: 6-4-23  Day 2:  Inpatient med surg   Slight left sided facial droop  Left sided weakness is slowly continuing to improve  Evaluated by therapy  Inpatient rehab recommended  Continue telemetry  Echo completed    Ciwa =0        ED Triage Vitals   06/02/23 2104 06/02/23 2104 06/02/23 2104 06/02/23 2104 06/02/23 2104   99 6 °F (37 6 °C) 100 18 (!) 208/117 97 %      Tympanic Monitor           No Pain          06/04/23 84 8 kg (187 lb)     Additional Vital Signs:     Date/Time Temp Pulse Resp BP MAP (mmHg) SpO2 O2 Device   06/05/23 14:26:51 97 9 °F (36 6 °C) 77 -- 139/81 100 96 % --   06/05/23 12:30:37 97 1 °F (36 2 °C)   Abnormal  62 -- 139/80 100 97 % --   06/05/23 0800 -- -- -- 144/82 -- -- --   06/05/23 07:31:09 97 5 °F (36 4 °C) 62 -- 144/82 103 97 % --   06/05/23 04:21:50 -- 54 Abnormal  -- 119/73 88 100 % --   06/04/23 2219 98 3 °F (36 8 °C) 55 16 122/74 90 99 % --   06/04/23 18:52:43 97 8 °F (36 6 °C) 55 16 111/56 74 96 % --   06/04/23 15:38:59 98 2 °F (36 8 °C) 57 16 114/64 81 98 % --   06/04/23 1100 -- 75 -- 128/74 -- -- --   06/04/23 08:04:50 97 3 °F (36 3 °C)   Abnormal  68 16 128/74 92 97 % --   06/04/23 00:57:26 -- 56 -- 110/57 75 99 % --   06/03/23 22:14:31 98 1 °F (36 7 °C) 56 16 112/69 83 97 % --   06/03/23 2000 98 1 °F (36 7 °C) -- -- -- -- -- None (Room air)   06/03/23 18:37:52 97 8 °F (36 6 °C) 63 16 126/61 83 98 % --   06/03/23 15:19:35 97 9 °F (36 6 °C) 77 16 126/75 92 99 % None (Room air)   06/03/23 13:26:18 -- 67 -- 117/74 88 96 % --   06/03/23 11:09:32 97 9 °F (36 6 °C) 79 18 133/89 104 97 % --   06/03/23 09:59:19 -- 69 -- 132/94 107 95 % --   06/03/23 0740 -- 66 18 124/74 -- 98 % None (Room air)   06/03/23 0739 -- 66 -- 124/74 -- -- --   06/03/23 0500 -- 68 16 127/74 -- 97 % None (Room air)   06/03/23 0400 -- 74 16 127/79 97 99 % None (Room air)   06/03/23 0300 -- 72 16 125/72 -- 99 % None (Room air)   06/03/23 0200 -- 80 16 136/77 99 99 % None (Room air)     Date and Time Eye Opening Best Verbal Response Best Motor Response [place] Coma Scale Score   06/05/23 0900 4 5 6 15   06/05/23 0400 4 5 6 15   06/05/23 0000 4 5 6 15   06/04/23 2000 4 5 6 15   06/04/23 1200 4 5 6 15   06/04/23 0400 4 5 6 15   06/04/23 0000 4 5 6 15   06/03/23 2000 4 5 6 15   06/03/23 1600 4 5 6 15   06/03/23 1200 4 5 6 15   06/03/23 1000 4 5 6 15   06/03/23 0740 4 5 6 15   06/03/23 0600 4 5 6 15   06/03/23 0500 4 5 6 15   06/03/23 0400 4 5 6 15   06/03/23 0300 4 5 6 15   06/03/23 0205 4 5 6 15   06/02/23 2120 4 5 6 15         Pertinent Labs/Diagnostic Test Results:     ECHO  6-4-23   •  Left Ventricle: Left ventricular cavity size is normal  Wall thickness is normal  The left ventricular ejection fraction is 60%  Systolic function is normal  Wall motion is normal  Diastolic function is normal   •  Tricuspid Valve: There is mild regurgitation  EKG   6-3-23     Normal sinus rhythm   Normal ECG   When compared with ECG of 30-JUL-2021 21:26,   No significant change was found         MRI brain wo contrast   Final (06/03 0840)      Acute/early subacute lacunar infarct in the right colin  No acute hemorrhage or mass effect      Nonacute lacunar infarct in the left cerebellum is new since 2021  CTA head and neck with and without contrast   Final (06/02 2313)      No acute intracranial abnormality      No large vessel occlusion, aneurysm, or dissection            Results from last 7 days   Lab Units 06/03/23  0416 06/02/23 2131   HEMATOCRIT % 41 2 43 5   HEMOGLOBIN g/dL 14 7 16 0   NEUTROS ABS Thousands/µL  --  5 07   PLATELETS Thousands/uL 165 183   WBC Thousand/uL 6 76 8 23         Results from last 7 days   Lab Units 06/03/23  0416 06/02/23  2131   ANION GAP mmol/L 4 8   BUN mg/dL 16 20   CALCIUM mg/dL 9 6 10 0 CHLORIDE mmol/L 105 103   CO2 mmol/L 25 24   CREATININE mg/dL 0 88 0 96   EGFR ml/min/1 73sq m 100 92   POTASSIUM mmol/L 4 1 4 1   MAGNESIUM mg/dL  --  2 3   PHOSPHORUS mg/dL  --  3 0   SODIUM mmol/L 134* 135     Results from last 7 days   Lab Units 06/03/23  0416 06/02/23  2131   ALBUMIN g/dL 3 5 4 0   ALK PHOS U/L 151* 170*   ALT U/L 133* 150*   AST U/L 106* 141*   TOTAL BILIRUBIN mg/dL 0 74 0 74   TOTAL PROTEIN g/dL 7 2 8 0     Results from last 7 days   Lab Units 06/05/23  1047 06/05/23  0555 06/04/23  2046 06/04/23  1539 06/04/23  1035 06/04/23  0640 06/03/23  2125 06/03/23  1604 06/03/23  1105   POC GLUCOSE mg/dl 108 118 160* 138 155* 120 199* 184* 135     Results from last 7 days   Lab Units 06/03/23  0416 06/02/23  2131   GLUCOSE RANDOM mg/dL 268* 246*         Results from last 7 days   Lab Units 06/02/23  2131   EAG mg/dl 151   HEMOGLOBIN A1C % 6 9*       Results from last 7 days   Lab Units 06/03/23  0416 06/02/23  2319 06/02/23  2131   HS TNI 0HR ng/L  --   --  8   HS TNI 2HR ng/L  --  9  --    HS TNI 4HR ng/L 9  --   --    HSTNI D2 ng/L  --  1  --    HSTNI D4 ng/L 1  --   --          Results from last 7 days   Lab Units 06/02/23  2131   INR  0 96   PROTIME seconds 13 0   PTT seconds 29     Results from last 7 days   Lab Units 06/02/23  2131   TSH 3RD GENERATON uIU/mL 1 660       Results from last 7 days   Lab Units 06/03/23  0104   BILIRUBIN UA  Negative   BLOOD UA  Negative   CLARITY UA  Clear   COLOR UA  Yellow   GLUCOSE UA mg/dl Negative   KETONES UA mg/dl Negative   LEUKOCYTES UA  Negative   NITRITE UA  Negative   PH UA  5 5   PROTEIN UA mg/dl Negative   SPEC GRAV UA  1 015   UROBILINOGEN UA E U /dl 0 2       ED Treatment:   Medication Administration from 06/02/2023 2103 to 06/03/2023 0745       Date/Time Order Dose Route Action     06/02/2023 2230 EDT thiamine (VITAMIN B1) 100 mg in sodium chloride 0 9 % 50 mL IVPB 100 mg Intravenous New Bag     40/89/7664 5092 EDT folic acid 1 mg in sodium chloride 0 9 % 50 mL IVPB 1 mg Intravenous New Bag     06/02/2023 2144 EDT multivitamin-minerals (CENTRUM) tablet 1 tablet 1 tablet Oral Given     06/03/2023 0415 EDT aspirin tablet 325 mg 325 mg Oral Given        Past Medical History:   Diagnosis Date   • Acute lymphangitis of left upper limb 11/14/2016   • Elevated LFTs 7/31/2021   • Hypertension      Present on Admission:  • Type 2 diabetes mellitus without complication, without long-term current use of insulin (HCC)  • Mixed hyperlipidemia  • Elevated LFTs      Admitting Diagnosis:     Speech disturbance [R47 9]  Facial droop [R29 810]  Stroke-like symptoms [R29 90]    Age/Sex: 51 y o  male    Scheduled Medications:    aspirin, 81 mg, Oral, Daily  clopidogrel, 75 mg, Oral, Daily  folic acid, 1 mg, Oral, Daily  heparin (porcine), 5,000 Units, Subcutaneous, Q8H JEFFREY  insulin lispro, 1-5 Units, Subcutaneous, HS  insulin lispro, 1-6 Units, Subcutaneous, TID AC  multivitamin-minerals, 1 tablet, Oral, Daily  nicotine, 14 mg, Transdermal, Daily  thiamine, 100 mg, Oral, Daily      Continuous IV Infusions:     PRN Meds:  methocarbamol, 500 mg, Oral, Q6H PRN        IP CONSULT TO NEUROLOGY  IP CONSULT TO CASE MANAGEMENT  IP CONSULT TO NUTRITION SERVICES    Network Utilization Review Department  ATTENTION: Please call with any questions or concerns to [phone] and carefully listen to the prompts so that you are directed to the right person  All voicemails are confidential   Marcelo Zuniga all requests for admission clinical reviews, approved or denied determinations and any other requests to dedicated fax number below belonging to the campus where the patient is receiving treatment   List of dedicated fax numbers for the Facilities:  [address] [place] [address] DENIALS (Administrative/Medical Necessity) [phone]   [address] [place] [address] (Maternity/NICU/Pediatrics) [address] [facility] [address] [facility] 134 815 [facility] [phone]

## 2023-06-05 NOTE — [facility] ADMISSION
Referral received for patient consideration of [facility] placement for rehab  Will review with [facility] physician as able and will update CM with determination

## 2023-06-05 NOTE — SPEECH THERAPY NOTE
SLP Motor Speech Evaluation      Patient Name: Silver Pizarro    PNCYN'K Date: 6/5/2023     Problem List  Principal Problem:    Right pontine stroke [facility])  Active Problems:    Type 2 diabetes mellitus without complication, without long-term current use of insulin (HCC)    Elevated LFTs    Mixed hyperlipidemia    Alcohol use disorder    Tobacco use      Past Medical History  Past Medical History:   Diagnosis Date   • Acute lymphangitis of left upper limb 11/14/2016   • Elevated LFTs 7/31/2021   • Hypertension        Past Surgical History  History reviewed  No pertinent surgical history  Impressions: Pt presents with functional speech skills in structured speech activities and during conversation  However, pt reports change in speech with occasional word finding/stuttering in both languages spoken [place])  Pt presents with adequate oral-motor strength and ROM  Speech Therapy recommended:  yes,  x1 to provide strategies to improve speech intelligibility  Can consider f/u in rehab or outpatient setting as needed  Patient's goal: Increase speech intelligibility     Goals: Pt will recall and utilize strategies to improve speech intelligibility with visual cues (handout)  HISTORY AND PHYSICAL:     Chief Complaint: Stroke like symptoms    History of Present Illness:  Silver Pizarro is a 51 y o  male with a PMH of type 2 diabetes, hypertension, alcohol abuse who presents with left-sided weakness, left-sided facial droop, slurred speech over the past 24 hours  The patient states around 5 PM yesterday he began to have slurred speech and a facial droop which was noticed by his family members  As well, he felt as if he was going to fall to his left side when he was walking at home  He states that he felt weak on his left side and felt like he was dragging his leg  He states that he went to work however and when he returned from work he had even more difficulty speaking    Eventually, he was convinced by family to come to the hospital   Of note, he has been a heavy drinker over the past several years however, he did stop 1 week ago and reports that he has not had a drink since  He states in the past, he has had withdrawal seizures however, he states that following cessation this week he had some shakiness but no other withdrawal symptoms  No headaches, vision changes, hearing changes  No numbness  No recent fevers  CTA of head was negative in ED    Patient admitted for MRI, stroke pathway work-up      Speech and Swallowing Mechanism Exam   Facial: symmetrical  Labial: WFL  Lingual: WFL  Velum: symmetrical  Mandible: adequate ROM  Dentition: adequate  Respiratory Support: on RA/no support      Respiration and Phonation  Maximum Phonation Time: >20 seconds    Able to sustain phonation counting from 1 to 20  Sustains phonation across words, phrases, sentences and in normal conversational speech  Vocal Quality:  clear/adequate     Articulation:  Single Syllable Words: precise articulation  Multisyllabic Words: precise   Phrase Level: precise  Sentence Level: precise  Conversational Speech: precise      puh tuh kuh (pattycake or buttercup)- (normal should exceed 8 reps in 10 seconds): 6 reps    Resonation: Normal nasality    S/S Oral apraxia:  None    S/S Verbal Apraxia:  None      INTELLIGIBILITY at the WORD LEVEL:  precise      INTELLIGIBILITY at the PHRASE LEVEL:  precise    INTELLIGIBILITY at the SENTENCE LEVEL:  precise    INTELLIGIBILITY in CONVERSATIONAL SPEECH:  Precise with occasional stuttering     Conversation:  Precise articulation   Occasional stuttering

## 2023-06-05 NOTE — PLAN OF CARE
Problem: PHYSICAL THERAPY ADULT  Goal: Performs mobility at highest level of function for planned discharge setting  See evaluation for individualized goals  Description: Treatment/Interventions: LE strengthening/ROM, Functional transfer training, Therapeutic exercise, Elevations, Endurance training, Patient/family training, Bed mobility, Gait training  Equipment Recommended:  (If patient is denied from IP rehab, recommend with outpatient PT )       See flowsheet documentation for full assessment, interventions and recommendations  Outcome: Progressing  Note: Prognosis: Excellent  Problem List: Decreased strength, Decreased mobility, Impaired balance, Decreased coordination, Decreased endurance  Assessment: Pt seen for PT treatment session w/ focus on gait training, stair training, HEP instruction  Pt demonstrated good progress this session w/ increased gait distance covered + improved ability to clear LLE (although w/ compensatory strategies - steppage gait pattern)  Pt w/ 3+/5 strength L anterior tibialis on MMT  Encouraged L ankle DF AROM as part of HEP  As pt is still functioning below his independent baseline, recommend acute medical rehab upon d/c   Barriers to Discharge: Inaccessible home environment     PT Discharge Recommendation: Post acute rehabilitation services (acute medical rehab)    See flowsheet documentation for full assessment

## 2023-06-05 NOTE — ASSESSMENT & PLAN NOTE
Appears chronic upon chart review, likely in the setting of ETOH use  · RUQ US in 2021 showed hepatomegaly and moderate steatosis     · Can trend PRN  · Should follow up with GI outpatient as he is at high risk for developing cirrhosis

## 2023-06-05 NOTE — ASSESSMENT & PLAN NOTE
Reports to the ED with slurred speech, unsteady gait and L facial droop x1 day  · CTA head/neck negative  · MRI brain showed Acute/early subacute lacunar infarct in the right colin  No acute hemorrhage or mass effect  Nonacute lacunar infarct in the left cerebellum is new since 2021  · Seen by neurology, likely small vessel as etiology  Plan to continue ASA/Plavix x 21 days then ASA monotherapy  · Echo without thrombus noted  · Telemetry monitoring without significant arrhtyhmias  · Outpatient neurology follow up  · PT/OT recommending rehab, would be good ARC candidate  CM made aware  Patient/wife agreeable     · Medically clear at this time for discharge, pending rehab still

## 2023-06-06 VITALS
TEMPERATURE: 97.9 F | SYSTOLIC BLOOD PRESSURE: 142 MMHG | WEIGHT: 187 LBS | OXYGEN SATURATION: 97 % | BODY MASS INDEX: 31.16 KG/M2 | HEART RATE: 64 BPM | DIASTOLIC BLOOD PRESSURE: 86 MMHG | HEIGHT: 65 IN | RESPIRATION RATE: 17 BRPM

## 2023-06-06 LAB
ALBUMIN SERPL BCP-MCNC: 4 G/DL (ref 3.5–5)
ALP SERPL-CCNC: 187 U/L (ref 46–116)
ALT SERPL W P-5'-P-CCNC: 140 U/L (ref 12–78)
ANION GAP SERPL CALCULATED.3IONS-SCNC: 5 MMOL/L (ref 4–13)
AST SERPL W P-5'-P-CCNC: 78 U/L (ref 5–45)
BILIRUB SERPL-MCNC: 0.77 MG/DL (ref 0.2–1)
BUN SERPL-MCNC: 25 MG/DL (ref 5–25)
CALCIUM SERPL-MCNC: 10.4 MG/DL (ref 8.3–10.1)
CHLORIDE SERPL-SCNC: 103 MMOL/L (ref 96–108)
CO2 SERPL-SCNC: 26 MMOL/L (ref 21–32)
CREAT SERPL-MCNC: 0.93 MG/DL (ref 0.6–1.3)
GFR SERPL CREATININE-BSD FRML MDRD: 96 ML/MIN/1.73SQ M
GLUCOSE SERPL-MCNC: 123 MG/DL (ref 65–140)
GLUCOSE SERPL-MCNC: 134 MG/DL (ref 65–140)
GLUCOSE SERPL-MCNC: 159 MG/DL (ref 65–140)
POTASSIUM SERPL-SCNC: 4.3 MMOL/L (ref 3.5–5.3)
PROT SERPL-MCNC: 8.4 G/DL (ref 6.4–8.4)
SODIUM SERPL-SCNC: 134 MMOL/L (ref 135–147)

## 2023-06-06 PROCEDURE — 80053 COMPREHEN METABOLIC PANEL: CPT | Performed by: PHYSICIAN ASSISTANT

## 2023-06-06 PROCEDURE — 82948 REAGENT STRIP/BLOOD GLUCOSE: CPT

## 2023-06-06 PROCEDURE — 97116 GAIT TRAINING THERAPY: CPT

## 2023-06-06 PROCEDURE — 99239 HOSP IP/OBS DSCHRG MGMT >30: CPT | Performed by: PHYSICIAN ASSISTANT

## 2023-06-06 RX ORDER — GLUCOSAMINE HCL/CHONDROITIN SU 500-400 MG
CAPSULE ORAL
Qty: 100 EACH | Refills: 0 | Status: SHIPPED | OUTPATIENT
Start: 2023-06-06

## 2023-06-06 RX ORDER — METFORMIN HYDROCHLORIDE 500 MG/1
500 TABLET, EXTENDED RELEASE ORAL
Qty: 30 TABLET | Refills: 0 | Status: SHIPPED | OUTPATIENT
Start: 2023-06-06 | End: 2023-06-08 | Stop reason: SDUPTHER

## 2023-06-06 RX ORDER — ASPIRIN 81 MG/1
81 TABLET, CHEWABLE ORAL DAILY
Refills: 0
Start: 2023-06-07

## 2023-06-06 RX ORDER — METHOCARBAMOL 500 MG/1
500 TABLET, FILM COATED ORAL EVERY 6 HOURS PRN
Qty: 30 TABLET | Refills: 0 | Status: SHIPPED | OUTPATIENT
Start: 2023-06-06

## 2023-06-06 RX ORDER — FOLIC ACID 1 MG/1
1 TABLET ORAL DAILY
Qty: 30 TABLET | Refills: 0 | Status: SHIPPED | OUTPATIENT
Start: 2023-06-07

## 2023-06-06 RX ORDER — CLOPIDOGREL BISULFATE 75 MG/1
75 TABLET ORAL DAILY
Qty: 18 TABLET | Refills: 0 | Status: SHIPPED | OUTPATIENT
Start: 2023-06-07 | End: 2023-06-25

## 2023-06-06 RX ORDER — LANOLIN ALCOHOL/MO/W.PET/CERES
100 CREAM (GRAM) TOPICAL DAILY
Qty: 30 TABLET | Refills: 0 | Status: SHIPPED | OUTPATIENT
Start: 2023-06-07

## 2023-06-06 RX ORDER — LANCETS 33 GAUGE
EACH MISCELLANEOUS
Qty: 100 EACH | Refills: 0 | Status: SHIPPED | OUTPATIENT
Start: 2023-06-06

## 2023-06-06 RX ORDER — BLOOD-GLUCOSE METER
KIT MISCELLANEOUS
Qty: 1 KIT | Refills: 0 | Status: SHIPPED | OUTPATIENT
Start: 2023-06-06

## 2023-06-06 RX ORDER — NICOTINE 21 MG/24HR
1 PATCH, TRANSDERMAL 24 HOURS TRANSDERMAL DAILY
Qty: 28 PATCH | Refills: 0 | Status: SHIPPED | OUTPATIENT
Start: 2023-06-07

## 2023-06-06 RX ORDER — BLOOD SUGAR DIAGNOSTIC
STRIP MISCELLANEOUS
Qty: 100 EACH | Refills: 0 | Status: SHIPPED | OUTPATIENT
Start: 2023-06-06

## 2023-06-06 RX ADMIN — METHOCARBAMOL TABLETS 500 MG: 500 TABLET, COATED ORAL at 09:46

## 2023-06-06 RX ADMIN — FOLIC ACID 1 MG: 1 TABLET ORAL at 09:46

## 2023-06-06 RX ADMIN — THIAMINE HCL TAB 100 MG 100 MG: 100 TAB at 09:46

## 2023-06-06 RX ADMIN — CLOPIDOGREL BISULFATE 75 MG: 75 TABLET ORAL at 09:46

## 2023-06-06 RX ADMIN — ASPIRIN 81 MG CHEWABLE TABLET 81 MG: 81 TABLET CHEWABLE at 09:46

## 2023-06-06 RX ADMIN — Medication 1 TABLET: at 09:46

## 2023-06-06 RX ADMIN — HEPARIN SODIUM 5000 UNITS: 5000 INJECTION INTRAVENOUS; SUBCUTANEOUS at 04:50

## 2023-06-06 NOTE — ASSESSMENT & PLAN NOTE
Reports to the ED with slurred speech, unsteady gait and L facial droop x1 day  · CTA head/neck negative  · MRI brain showed Acute/early subacute lacunar infarct in the right colin  No acute hemorrhage or mass effect  Nonacute lacunar infarct in the left cerebellum is new since 2021  · Seen by neurology, likely small vessel as etiology  Plan to continue ASA/Plavix x 21 days then ASA monotherapy     · No need for statin per Neurology  · Echo without thrombus noted  · Telemetry monitoring without significant arrhtyhmias  · Outpatient neurology follow up  · PT/OT recommending rehab, now stable for discharge home with outpatient PT

## 2023-06-06 NOTE — DISCHARGE SUMMARY
[address] [facility]  Discharge- Amelia Guzmanr 1975, 51 y o  male MRN: [id number]  Unit/Bed#: [facility] 727-01 Encounter: [id number]  Primary Care Provider: Mario Foote MD   Date and time admitted to hospital: 6/2/2023  9:07 PM    * Right pontine stroke [facility])  Assessment & Plan  Reports to the ED with slurred speech, unsteady gait and L facial droop x1 day  · CTA head/neck negative  · MRI brain showed Acute/early subacute lacunar infarct in the right colin  No acute hemorrhage or mass effect  Nonacute lacunar infarct in the left cerebellum is new since 2021  · Seen by neurology, likely small vessel as etiology  Plan to continue ASA/Plavix x 21 days then ASA monotherapy  · No need for statin per Neurology  · Echo without thrombus noted  · Telemetry monitoring without significant arrhtyhmias  · Outpatient neurology follow up  · PT/OT recommending rehab, now stable for discharge home with outpatient PT     Type 2 diabetes mellitus without complication, without long-term current use of insulin (HCC)  Assessment & Plan  Lab Results   Component Value Date    HGBA1C 6 9 (H) 06/02/2023        · A1C 6 9  Non-compliant with metformin as an outpatient; would resume on discharge  Elevated LFTs  Assessment & Plan  Appears chronic upon chart review, likely in the setting of ETOH use  · RUQ US in 2021 showed hepatomegaly and moderate steatosis  · Consider outpatient follow up with GI    Mixed hyperlipidemia  Assessment & Plan  Of note, has declined cholesterol lowering medications in the past  · No need for statin at this time per Neurology    Alcohol use disorder  Assessment & Plan  Last drink x10 days ago  · Continue folic acid, thiamine and MV supplementation  · No signs of withdrawal at this time    Tobacco use  Assessment & Plan  Smokes appx 10 cigarettes a day    · NRT    Medical Problems     Resolved Problems  Date Reviewed: 6/6/2023   None       Discharging Physician / Practitioner: Laura Senior PA-C  PCP: Audelia Mas MD  Admission Date:   Admission Orders (From admission, onward)     Ordered        06/03/23 0032  INPATIENT ADMISSION  Once                      Discharge Date: 06/06/23    Consultations During Hospital Stay:  · Dr Abdi Aguirre    Procedures Performed:     MRI brain  Acute/early subacute lacunar infarct in the right colin  No acute hemorrhage or mass effect     Nonacute lacunar infarct in the left cerebellum is new since 2021  CTA head/neck  No acute intracranial abnormality  No large vessel occlusion, aneurysm, or dissection    Echo  •  Left Ventricle: Left ventricular cavity size is normal  Wall thickness is normal  The left ventricular ejection fraction is 60%  Systolic function is normal  Wall motion is normal  Diastolic function is normal   •  Tricuspid Valve: There is mild regurgitation  Significant Findings / Test Results:   · See above    Incidental Findings:   · none     Test Results Pending at Discharge (will require follow up):   · none     Outpatient Tests Requested:  · none    Complications:  none    Reason for Admission: Left-sided facial droop and weakness    Hospital Course:   Jessica Cornelius is a 51 y o  male patient who originally presented to the hospital on 6/2/2023 due to left-sided weakness and left facial droop  Patient was found to have a acute/subacute right pontine stroke  He was seen in consultation by neurology  Plan is aspirin and Plavix for 21 days, then aspirin monotherapy  Patient does not need a statin per neurology  Originally patient was going to go to acute rehab, but he improved physically and is stable for discharge home with outpatient PT  Patient will follow-up with neurology as an outpatient  Please see above list of diagnoses and related plan for additional information  Condition at Discharge: good    Discharge Day Visit / Exam:   Subjective: Feels good  Wants to go home    Still gets some "muscle cramping in left leg  Vitals: Blood Pressure: 142/86 (06/06/23 0737)  Pulse: 64 (06/06/23 0737)  Temperature: 97 9 °F (36 6 °C) (06/06/23 0737)  Temp Source: Oral (06/03/23 2000)  Respirations: 17 (06/06/23 0737)  Height: 5' 5\" (165 1 cm) (06/04/23 1100)  Weight - Scale: 84 8 kg (187 lb) (06/04/23 1100)  SpO2: 97 % (06/06/23 0737)  Exam:   Physical Exam  Vitals and nursing note reviewed  Constitutional:       General: He is not in acute distress  Appearance: He is well-developed  HENT:      Head: Normocephalic and atraumatic  Eyes:      Conjunctiva/sclera: Conjunctivae normal    Cardiovascular:      Rate and Rhythm: Normal rate and regular rhythm  Heart sounds: No murmur heard  Pulmonary:      Effort: Pulmonary effort is normal  No respiratory distress  Breath sounds: Normal breath sounds  Abdominal:      Palpations: Abdomen is soft  Tenderness: There is no abdominal tenderness  Musculoskeletal:         General: No swelling  Cervical back: Neck supple  Skin:     General: Skin is warm and dry  Capillary Refill: Capillary refill takes less than 2 seconds  Neurological:      Mental Status: He is alert and oriented to person, place, and time  Motor: Weakness (mild RUE weakness) present  Psychiatric:         Mood and Affect: Mood normal           Discussion with Family: Attempted to update  (wife) via phone  Unable to contact  Discharge instructions/Information to patient and family:   See after visit summary for information provided to patient and family  Provisions for Follow-Up Care:  See after visit summary for information related to follow-up care and any pertinent home health orders  Disposition:   Home    Planned Readmission: none     Discharge Statement:  I spent 45 minutes discharging the patient  This time was spent on the day of discharge  I had direct contact with the patient on the day of discharge   Greater than 50% of the " total time was spent examining patient, answering all patient questions, arranging and discussing plan of care with patient as well as directly providing post-discharge instructions  Additional time then spent on discharge activities  Discharge Medications:  See after visit summary for reconciled discharge medications provided to patient and/or family        **Please Note: This note may have been constructed using a voice recognition system**

## 2023-06-06 NOTE — RESTORATIVE TECHNICIAN NOTE
Restorative Technician Note      Patient Name: Tesha Nath     Note Type: Mobility  Patient Position Upon Consult: Supine  Activity Performed: Ambulated; Dangled; Stood  Assistive Device: Other (Comment) (none)  Education Provided: Yes  Patient Position at End of Consult: Supine;  All needs within reach; Bed/Chair alarm activated    Dre Ceja BS, Restorative Technician, [employer]

## 2023-06-06 NOTE — ASSESSMENT & PLAN NOTE
Of note, has declined cholesterol lowering medications in the past  · No need for statin at this time per Neurology

## 2023-06-06 NOTE — ASSESSMENT & PLAN NOTE
Lab Results   Component Value Date    HGBA1C 6 9 (H) 06/02/2023        · A1C 6 9  Non-compliant with metformin as an outpatient; would resume on discharge

## 2023-06-06 NOTE — PLAN OF CARE
Problem: Neurological Deficit  Goal: Neurological status is stable or improving  Description: Interventions:  - Monitor and assess patient's level of consciousness, motor function, sensory function, and level of assistance needed for ADLs  - Monitor and report changes from baseline  Collaborate with interdisciplinary team to initiate plan and implement interventions as ordered  - Provide and maintain a safe environment  - Consider seizure precautions  - Consider fall precautions  - Consider aspiration precautions  - Consider bleeding precautions    6/6/2023 1408 by Juan Contreras RN  Outcome: Progressing  6/6/2023 1407 by Juan Contreras RN  Outcome: Progressing     Problem: PAIN - ADULT  Goal: Verbalizes/displays adequate comfort level or baseline comfort level  Description: Interventions:  - Encourage patient to monitor pain and request assistance  - Assess pain using appropriate pain scale  - Administer analgesics based on type and severity of pain and evaluate response  - Implement non-pharmacological measures as appropriate and evaluate response  - Consider cultural and social influences on pain and pain management  - Notify physician/advanced practitioner if interventions unsuccessful or patient reports new pain  6/6/2023 1408 by Juan Contreras RN  Outcome: Progressing  6/6/2023 1407 by Juan Contreras RN  Outcome: Progressing     Problem: INFECTION - ADULT  Goal: Absence or prevention of progression during hospitalization  Description: INTERVENTIONS:  - Assess and monitor for signs and symptoms of infection  - Monitor lab/diagnostic results  - Monitor all insertion sites, i e  indwelling lines, tubes, and drains  - Monitor endotracheal if appropriate and nasal secretions for changes in amount and color  - [place] appropriate cooling/warming therapies per order  - Administer medications as ordered  - Instruct and encourage patient and family to use good hand hygiene technique  - Identify and instruct in appropriate isolation precautions for identified infection/condition  6/6/2023 1408 by Antoine Bower RN  Outcome: Progressing  6/6/2023 1407 by Antoine Bower RN  Outcome: Progressing     Problem: SAFETY ADULT  Goal: Patient will remain free of falls  Description: INTERVENTIONS:  - Educate patient/family on patient safety including physical limitations  - Instruct patient to call for assistance with activity   - Consult OT/PT to assist with strengthening/mobility   - Keep Call bell within reach  - Keep bed low and locked with side rails adjusted as appropriate  - Keep care items and personal belongings within reach  - Initiate and maintain comfort rounds        - Consider moving patient to room near nurses station  6/6/2023 1408 by Antoine Bower RN  Outcome: Progressing  6/6/2023 1407 by Antoine Bower RN  Outcome: Progressing     Problem: DISCHARGE PLANNING  Goal: Discharge to home or other facility with appropriate resources  Description: INTERVENTIONS:  - Identify barriers to discharge w/patient and caregiver  - Arrange for needed discharge resources and transportation as appropriate  - Identify discharge learning needs (meds, wound care, etc )  - Arrange for interpretive services to assist at discharge as needed  - Refer to Case Management Department for coordinating discharge planning if the patient needs post-hospital services based on physician/advanced practitioner order or complex needs related to functional status, cognitive ability, or social support system  6/6/2023 1408 by Antoine Bower RN  Outcome: Progressing  6/6/2023 1407 by Antoine Bower RN  Outcome: Progressing     Problem: Knowledge Deficit  Goal: Patient/family/caregiver demonstrates understanding of disease process, treatment plan, medications, and discharge instructions  Description: Complete learning assessment and assess knowledge base    Interventions:  - Provide teaching at level of understanding  - Provide teaching via preferred learning methods  6/6/2023 1408 by Freddie Cohn RN  Outcome: Progressing  6/6/2023 1407 by Freddie Cohn RN  Outcome: Progressing     Problem: Activity Intolerance/Impaired Mobility  Goal: Mobility/activity is maintained at optimum level for patient  Description: Interventions:  - Assess and monitor patient  barriers to mobility and need for assistive/adaptive devices  - Assess patient's emotional response to limitations  - Collaborate with interdisciplinary team and initiate plans and interventions as ordered  - Encourage independent activity per ability   - Maintain proper body alignment  - Perform active/passive rom as tolerated/ordered  - Plan activities to conserve energy   - Turn patient as appropriate  6/6/2023 1408 by Freddie Cohn RN  Outcome: Completed  6/6/2023 1407 by Freddie Cohn RN  Outcome: Progressing     Problem: Communication Impairment  Goal: Ability to express needs and understand communication  Description: Assess patient's communication skills and ability to understand information  Patient will demonstrate use of effective communication techniques, alternative methods of communication and understanding even if not able to speak  - Encourage communication and provide alternate methods of communication as needed  - Collaborate with case management/ for discharge needs  - Include patient/family/caregiver in decisions related to communication  Outcome: Completed     Problem: Potential for Aspiration  Goal: Non-ventilated patient's risk of aspiration is minimized  Description: Assess and monitor vital signs, respiratory status, and labs (WBC)  Monitor for signs of aspiration (tachypnea, cough, rales, wheezing, cyanosis, fever)  - Assess and monitor patient's ability to swallow  - Place patient up in chair to eat if possible    - HOB up at 90 degrees to eat if unable to get patient up into chair   - Supervise patient during oral intake  - Instruct patient/ family to take small bites  - Instruct patient/ family to take small single sips when taking liquids  - Follow patient-specific strategies generated by speech pathologist   6/6/2023 1408 by Johnna Crespo RN  Outcome: Completed  6/6/2023 1407 by Johnna Crespo RN  Outcome: Progressing     Problem: Nutrition  Goal: Nutrition/Hydration status is improving  Description: Monitor and assess patient's nutrition/hydration status for malnutrition (ex- brittle hair, bruises, dry skin, pale skin and conjunctiva, muscle wasting, smooth red tongue, and disorientation)  Collaborate with interdisciplinary team and initiate plan and interventions as ordered  Monitor patient's weight and dietary intake as ordered or per policy  Utilize nutrition screening tool and intervene per policy  Determine patient's food preferences and provide high-protein, high-caloric foods as appropriate  - Assist patient with eating   - Allow adequate time for meals   - Encourage patient to take dietary supplement as ordered  - Collaborate with clinical nutritionist   - Include patient/family/caregiver in decisions related to nutrition    Outcome: Completed     Problem: SAFETY ADULT  Goal: Maintain or return to baseline ADL function  Description: INTERVENTIONS:  -  Assess patient's ability to carry out ADLs; assess patient's baseline for ADL function and identify physical deficits which impact ability to perform ADLs (bathing, care of mouth/teeth, toileting, grooming, dressing, etc )  - Assess/evaluate cause of self-care deficits   - Assess range of motion  - Assess patient's mobility; develop plan if impaired  - Assess patient's need for assistive devices and provide as appropriate  - Encourage maximum independence but intervene and supervise when necessary  - Involve family in performance of ADLs  - Assess for home care needs following discharge   - Consider OT consult to assist with ADL evaluation and planning for discharge  - Provide patient education as appropriate  6/6/2023 1408 by Natalie Murray RN  Outcome: Completed  6/6/2023 1407 by Natalie Murray RN  Outcome: Progressing  Goal: Maintains/Returns to pre admission functional level  Description: INTERVENTIONS:  - Perform BMAT or MOVE assessment daily    - Set and communicate daily mobility goal to care team and patient/family/caregiver     - Collaborate with rehabilitation services on mobility goals if consulted  - Ambulate patient 2 times a day  - Out of bed for toileting  - Record patient progress and toleration of activity level   6/6/2023 1408 by Natalie Murray RN  Outcome: Completed  6/6/2023 1407 by Natalie Murray RN  Outcome: Progressing

## 2023-06-06 NOTE — PHYSICAL THERAPY NOTE
PHYSICAL THERAPY NOTE          Patient Name: Aron Galdamez  CXMIG'U Date: 6/6/2023 06/06/23 1202   PT Last Visit   PT Visit Date 06/06/23   Note Type   Note Type Treatment   Pain Assessment   Pain Assessment Tool 0-10   Pain Score No Pain   Restrictions/Precautions   Weight Bearing Precautions Per Order No   Other Precautions Fall Risk  (L sided weakness, mild slurred speech)   General   Chart Reviewed Yes   Response to Previous Treatment Patient with no complaints from previous session  Family/Caregiver Present No   Cognition   Overall Cognitive Status WFL   Arousal/Participation Cooperative   Attention Within functional limits   Orientation Level Oriented X4   Memory Within functional limits   Following Commands Follows all commands and directions without difficulty   Subjective   Subjective agreeable  Bed Mobility   Supine to Sit Unable to assess   Sit to Supine Unable to assess   Additional Comments presented walking in room unassisted from bathroom to bed and returned to EOB upon conclusion   Transfers   Sit to Stand 5  Supervision   Additional items Increased time required;Verbal cues   Stand to Sit 5  Supervision   Additional items Verbal cues   Stand pivot 5  Supervision   Additional items Increased time required   Additional Comments no AD   Ambulation/Elevation   Gait pattern Improper Weight shift;Decreased foot clearance;L Foot drag;Short stride; Excessively slow; Steppage   Gait Assistance 5  Supervision   Additional items Verbal cues   Assistive Device None   Distance 100'   Stair Management Assistance 5  Supervision   Additional items Verbal cues; Increased time required   Stair Management Technique One rail L;Step to pattern; Foreward;Nonreciprocal   Number of Stairs 7   Ambulation/Elevation Additional Comments no overt LOB, decreased L foot clearance due to weak DF and decreased L>R arm swing   Balance Static Sitting Good   Dynamic Sitting Fair +   Static Standing Fair   Dynamic Standing Fair -   Ambulatory Fair -   Endurance Deficit   Endurance Deficit Yes   Endurance Deficit Description weakness, fatigue   Activity Tolerance   Activity Tolerance Patient limited by fatigue   Medical Staff Made Aware OT, GRABIEL   Nurse Made Aware yes-cleared to mobilize   Assessment   Prognosis Excellent   Problem List Decreased strength;Decreased endurance;Decreased mobility; Impaired balance;Decreased coordination   Assessment Pt agreeable to participate in PT session  Pt performed functional mobility as outlined above  L foot drag with ambulation however no overt LOB or unsteadiness throughout  Decreased L>R arm swing  Increased gait unsteadiness with trial with normal arm swing  VC for increased L foot clearance  Pt states he feels comfortable returning home at this time although he is not completely back to baseline  Pt left seated EOB with call bell, phone, and all personal needs within reach  Pt will continue to benefit from skilled acute care PT to further address their functional mobility limitations  The patient's AM-PAC Basic Mobility Inpatient Short Form Raw Score is 20  A Raw score of greater than 16 suggests the patient may benefit from discharge to home  Please also refer to the recommendation of the Physical Therapist for safe discharge planning  D/C recommendations are OPPT and increased family support  Barriers to Discharge Inaccessible home environment;Decreased caregiver support   Goals   Patient Goals to go home and return to complete baseline   STG Expiration Date 06/14/23   PT Treatment Day 2   Plan   Treatment/Interventions Functional transfer training;LE strengthening/ROM; Elevations; Therapeutic exercise; Endurance training;Patient/family training;Equipment eval/education; Bed mobility;Gait training;Spoke to nursing;Spoke to case management;OT   Progress Progressing toward goals   PT Frequency 3-5x/wk Recommendation   PT Discharge Recommendation Home with outpatient rehabilitation  (and increased family support)   Additional Comments pt states his wife works 7AM-1:30PM and mother/sister may be able to stay with him while wife is at work   Nirav [address] in [address] [place] [address] 4   Lying on Back to Sitting on Edge of Flat Bed Without Bedrails 4   Moving Bed to Chair 3   Standing Up From [address] 3   Walk in [address] 3-5 Stairs With Railing 3   Emanuel Do [address]34 Score 20   Basic Mobility Standardized Score 43 99   Highest Level Of Mobility   JH-HLM Goal 6: Walk 10 steps or more   JH-HLM Achieved 7: Walk 25 feet or more   Nancy Bojorquez, PT, DPT

## 2023-06-06 NOTE — ASSESSMENT & PLAN NOTE
Last drink x10 days ago  · Continue folic acid, thiamine and MV supplementation  · No signs of withdrawal at this time

## 2023-06-06 NOTE — ASSESSMENT & PLAN NOTE
Appears chronic upon chart review, likely in the setting of ETOH use  · RUQ US in 2021 showed hepatomegaly and moderate steatosis     · Consider outpatient follow up with GI

## 2023-06-06 NOTE — CASE MANAGEMENT
Case Management Discharge Planning Note    Patient name Jillian De La Vega  Location [facility] 727/PPHP 248-73 MRN [id number]  : 1975 Date 2023       Current Admission Date: 2023  Current Admission Diagnosis:Right pontine stroke [facility])   Patient Active Problem List    Diagnosis Date Noted   • Alcohol use disorder 2023   • Right pontine stroke ([facility] ) 2023   • Tobacco use 2023   • Fatty liver 2022   • Alcohol use disorder, moderate, in early remission ([facility] ) 2021   • Asymptomatic gallstones 2021   • Mixed hyperlipidemia 2021   • Alcohol withdrawal seizure [facility] ) 2021   • Type 2 diabetes mellitus without complication, without long-term current use of insulin ([facility] ) 2021   • Elevated LFTs 2021   • Essential hypertension 2016      LOS (days): 3  Geometric Mean LOS (GMLOS) (days):   Days to GMLOS:     OBJECTIVE:  Risk of Unplanned Readmission Score: 8 24         Current admission status: Inpatient   Preferred Pharmacy:   [facility] [id number], [address] [place] [address]  Phone: [phone] Fax: [phone]    Primary Care Provider: Fiorella Beltran MD    Primary Insurance: JILL PEÑA PENDING  Secondary Insurance:     DISCHARGE DETAILS:    Discharge planning discussed with[de-identified] bedside with pt  Freedom of Choice: Yes  Comments - Freedom of Choice: Pt reccomendations home with OP PT  CM contacted family/caregiver?: No- see comments (Pt is A&O)  Were Treatment Team discharge recommendations reviewed with patient/caregiver?: Yes  Did patient/caregiver verbalize understanding of patient care needs?: Yes  Were patient/caregiver advised of the risks associated with not following Treatment Team discharge recommendations?: Yes    Contacts  Patient Contacts: Sonia Cash  Relationship to Patient[de-identified] Family (spouse)  Contact Method: Phone  Phone Number: [phone]  Reason/Outcome: Emergency Contact, Discharge Marin Pop         Is the patient interested in [facility] at discharge?: No    DME Referral Provided  Referral made for DME?: No    Other Referral/Resources/Interventions Provided:  Interventions: Outpatient PT, Outpatient OT  Referral Comments: Pt provided with [facility] OP resource list    Would you like to participate in our [address] service program?  : No - Declined    Treatment Team Recommendation: Home (w/ OP PT)  Discharge Destination Plan[de-identified] Home (w/OP PT)  Transport at Discharge : Family

## 2023-06-06 NOTE — PLAN OF CARE
Problem: PHYSICAL THERAPY ADULT  Goal: Performs mobility at highest level of function for planned discharge setting  See evaluation for individualized goals  Description: Treatment/Interventions: LE strengthening/ROM, Functional transfer training, Therapeutic exercise, Elevations, Endurance training, Patient/family training, Bed mobility, Gait training  Equipment Recommended:  (If patient is denied from IP rehab, recommend with outpatient PT )       See flowsheet documentation for full assessment, interventions and recommendations  Outcome: Progressing  Note: Prognosis: Excellent  Problem List: Decreased strength, Decreased endurance, Decreased mobility, Impaired balance, Decreased coordination  Assessment: Pt agreeable to participate in PT session  Pt performed functional mobility as outlined above  L foot drag with ambulation however no overt LOB or unsteadiness throughout  Decreased L>R arm swing  Increased gait unsteadiness with trial with normal arm swing  VC for increased L foot clearance  Pt states he feels comfortable returning home at this time although he is not completely back to baseline  Pt left seated EOB with call bell, phone, and all personal needs within reach  Pt will continue to benefit from skilled acute care PT to further address their functional mobility limitations  The patient's AM-PAC Basic Mobility Inpatient Short Form Raw Score is 20  A Raw score of greater than 16 suggests the patient may benefit from discharge to home  Please also refer to the recommendation of the Physical Therapist for safe discharge planning  D/C recommendations are OPPT and increased family support  Barriers to Discharge: Inaccessible home environment, Decreased caregiver support     PT Discharge Recommendation: Home with outpatient rehabilitation (and increased family support)    See flowsheet documentation for full assessment

## 2023-06-07 ENCOUNTER — TRANSITIONAL CARE MANAGEMENT (OUTPATIENT)
Dept: FAMILY MEDICINE CLINIC | Facility: CLINIC | Age: 48
End: 2023-06-07

## 2023-06-08 ENCOUNTER — OFFICE VISIT (OUTPATIENT)
Dept: FAMILY MEDICINE CLINIC | Facility: CLINIC | Age: 48
End: 2023-06-08

## 2023-06-08 VITALS
HEART RATE: 69 BPM | OXYGEN SATURATION: 98 % | SYSTOLIC BLOOD PRESSURE: 124 MMHG | DIASTOLIC BLOOD PRESSURE: 72 MMHG | RESPIRATION RATE: 16 BRPM | HEIGHT: 65 IN | TEMPERATURE: 98.2 F | BODY MASS INDEX: 30.86 KG/M2 | WEIGHT: 185.2 LBS

## 2023-06-08 DIAGNOSIS — E11.9 TYPE 2 DIABETES MELLITUS WITHOUT COMPLICATION, WITHOUT LONG-TERM CURRENT USE OF INSULIN (HCC): ICD-10-CM

## 2023-06-08 DIAGNOSIS — Z59.89 DOES NOT HAVE HEALTH INSURANCE: ICD-10-CM

## 2023-06-08 DIAGNOSIS — E78.2 MIXED HYPERLIPIDEMIA: ICD-10-CM

## 2023-06-08 DIAGNOSIS — F10.21 ALCOHOL USE DISORDER, MODERATE, IN EARLY REMISSION (HCC): ICD-10-CM

## 2023-06-08 DIAGNOSIS — R79.89 ELEVATED LFTS: ICD-10-CM

## 2023-06-08 DIAGNOSIS — I63.50 RIGHT PONTINE STROKE (HCC): Primary | ICD-10-CM

## 2023-06-08 LAB
CREAT UR-MCNC: 38.9 MG/DL
MICROALBUMIN UR-MCNC: <5 MG/L (ref 0–20)
MICROALBUMIN/CREAT 24H UR: <13 MG/G CREATININE (ref 0–30)

## 2023-06-08 PROCEDURE — 82570 ASSAY OF URINE CREATININE: CPT | Performed by: FAMILY MEDICINE

## 2023-06-08 PROCEDURE — 82043 UR ALBUMIN QUANTITATIVE: CPT | Performed by: FAMILY MEDICINE

## 2023-06-08 PROCEDURE — 99496 TRANSJ CARE MGMT HIGH F2F 7D: CPT | Performed by: FAMILY MEDICINE

## 2023-06-08 RX ORDER — METFORMIN HYDROCHLORIDE 500 MG/1
500 TABLET, EXTENDED RELEASE ORAL
Qty: 90 TABLET | Refills: 1 | Status: SHIPPED | OUTPATIENT
Start: 2023-06-08

## 2023-06-08 SDOH — ECONOMIC STABILITY - INCOME SECURITY: OTHER PROBLEMS RELATED TO HOUSING AND ECONOMIC CIRCUMSTANCES: Z59.89

## 2023-06-08 NOTE — PROGRESS NOTES
Name: Stoney Mclean      : 1975      MRN: [id number]  Encounter Provider: Paula Ortega MD  Encounter Date: 2023   Encounter department: [address] [place] [address] Dr Schmidt  Type 2 diabetes mellitus without complication, without long-term current use of insulin (HCC)  -     Albumin / creatinine urine ratio; Future; Expected date: 2023           Subjective     HPI  Review of Systems    Past Medical History:   Diagnosis Date   • Acute lymphangitis of left upper limb 2016   • Elevated LFTs 2021   • Hypertension      History reviewed  No pertinent surgical history  History reviewed  No pertinent family history  Social History     Socioeconomic History   • Marital status: /Civil Union     Spouse name: None   • Number of children: None   • Years of education: None   • Highest education level: None   Occupational History   • None   Tobacco Use   • Smoking status: Heavy Smoker     Packs/day: 0 50     Types: Cigarettes   • Smokeless tobacco: Never   Vaping Use   • Vaping Use: Never used   Substance and Sexual Activity   • Alcohol use: Not Currently   • Drug use: No   • Sexual activity: None   Other Topics Concern   • None   Social History Narrative   • None     Social Determinants of Health     Financial Resource Strain: Not on file   Food Insecurity: Not on file   Transportation Needs: Not on file   Physical Activity: Not on file   Stress: Not on file   Social Connections: Not on file   Intimate Partner Violence: Not on file   Housing Stability: Not on file     Current Outpatient Medications on File Prior to Visit   Medication Sig   • Alcohol Swabs 70 % PADS May substitute brand based on insurance coverage  Check glucose BID  • aspirin 81 mg chewable tablet Chew 1 tablet (81 mg total) daily Do not start before 2023  • Blood Glucose Monitoring Suppl (OneTouch Verio Reflect) w/Device KIT May substitute brand based on insurance coverage   Check "glucose BID  • clopidogrel (PLAVIX) 75 mg tablet Take 1 tablet (75 mg total) by mouth daily for 18 days Do not start before June 7, 2023  • folic acid (FOLVITE) 1 mg tablet Take 1 tablet (1 mg total) by mouth daily Do not start before June 7, 2023  • glucose blood (OneTouch Verio) test strip May substitute brand based on insurance coverage  Check glucose BID  • metFORMIN (GLUCOPHAGE-XR) 500 mg 24 hr tablet Take 1 tablet (500 mg total) by mouth daily with breakfast   • methocarbamol (ROBAXIN) 500 mg tablet Take 1 tablet (500 mg total) by mouth every 6 (six) hours as needed for muscle spasms   • nicotine (NICODERM CQ) 14 mg/24hr TD 24 hr patch Place 1 patch on the skin over 24 hours daily Do not start before June 7, 2023  • OneTouch Delica Lancets 32W MISC May substitute brand based on insurance coverage  Check glucose BID  • thiamine 100 MG tablet Take 1 tablet (100 mg total) by mouth daily Do not start before June 7, 2023       No Known Allergies  Immunization History   Administered Date(s) Administered   • Influenza Quadrivalent Preservative Free 3 years and older IM 09/22/2016       Objective     /90 (BP Location: Left arm, Patient Position: Sitting, Cuff Size: Standard)   Pulse 69   Temp 98 2 °F (36 8 °C) (Temporal)   Resp 16   Ht 5' 5\" (1 651 m)   Wt 84 kg (185 lb 3 2 oz)   SpO2 98%   BMI 30 82 kg/m²     Physical Exam  Cinthia Conde MD  "

## 2023-06-08 NOTE — Clinical Note
Good morning, Please see my note for details  Patient with recent diagnosis of stroke and no medical insurance  He is requesting to contact either his wife, Bakari Aguilar or his sister, Courtney Montalvo  with all information    Thank you

## 2023-06-08 NOTE — PROGRESS NOTES
Assessment & Plan     1  Right pontine stroke [facility])  Assessment & Plan:  Patient presented with left-sided facial droop and weakness as well as slurred speech  CTA head/neck negative  MRI brain showed Acute/early subacute lacunar infarct in the right colin  No acute hemorrhage or mass effect  Nonacute lacunar infarct in the left cerebellum is new since 2021  No abnormal findings on echocardiogram   No arrhythmias on telemetry  Neurology recommended 21-day DAPT of Plavix and aspirin followed by aspirin 81 mg daily indefinitely  Currently facial droop has resolved, no left-sided weakness on exam   Pending evaluation by PT/OT  Occasional slurred speech, improving day by day  Patient denies symptoms of headaches or dizziness, no visual changes  He is completing 3-week course of Plavix and aspirin and will switch to mono therapy with aspirin  Patient was evaluated by neurology while inpatient and will schedule follow-up with [facility] neurology  We discussed importance of risk factor avoidance specifically alcohol abstinence  Patient's cholesterol has improved within past year  Neurology did not feel strongly that he should be on statin  2  Type 2 diabetes mellitus without complication, without long-term current use of insulin ([facility])  Assessment & Plan:    Lab Results   Component Value Date    HGBA1C 6 9 (H) 06/02/2023     Overall acceptable glycemic control  Patient has stopped drinking  He follows healthy diet  Start low-dose of metformin  mg daily, reassess in 3 months    Orders:  -     Albumin / creatinine urine ratio; Future; Expected date: 06/08/2023  -     CBC; Future  -     Comprehensive metabolic panel; Future  -     metFORMIN (GLUCOPHAGE-XR) 500 mg 24 hr tablet; Take 1 tablet (500 mg total) by mouth daily with breakfast  -     Albumin / creatinine urine ratio    3  Elevated LFTs  Assessment & Plan:  Likely due to EtOH use     Patient reports complete sobriety sensation hospitalization  Personal history of gallstones, he is asymptomatic, monitor labs    Orders:  -     CBC; Future  -     Comprehensive metabolic panel; Future    4  Mixed hyperlipidemia  Assessment & Plan:  Significant improvement of cholesterol within past 2 years  Neurology did not recommend statin therapy   Continue healthy diet      5  Alcohol use disorder, moderate, in early remission [facility])  Assessment & Plan:  Patient has been sober since recent hospitalization  Reports no cravings      6  Does not have health insurance  Assessment & Plan:  Reportedly application for jerry care/Medicaid was initiated during recent hospitalization  Patient and his wife are very hesitant to schedule OT and PT due to concern of financial responsibility  Patient has recently changed the jobs but is not able to return to work at present time due to stroke sequela  He was supposed to be eligible for medical insurance from his employer within next few weeks  Consult       Return in about 3 months (around 9/8/2023)  Subjective     Transitional Care Management Review:   Amelia Smith is a 51 y o  male here for TCM follow up  During the TCM phone call patient stated:  TCM Call     Date and time call was made  6/7/2023  9:38 AM    Hospital care reviewed  Records reviewed    Patient was hospitialized at  [facility]    Date of Admission  06/02/23    Date of discharge  06/06/23    Diagnosis  right pontine stroke    Disposition  Home    Were the patients medications reviewed and updated  No    Current Symptoms  None      TCM Call     Post hospital issues  None    Should patient be enrolled in anticoag monitoring? No    Scheduled for follow up?   Yes    Did you obtain your prescribed medications  Yes    Do you need help managing your prescriptions or medications  No    Is transportation to your appointment needed  No    I have advised the patient to call PCP with any new or worsening symptoms  Adalid Muñiz "MA    Living Arrangements  Parents    Support System  Parent    Are you recieving any outpatient services  No    Are you recieving home care services  No    Interperter language line needed  No    Counseling  Family    Counseling topics  Importance of RX compliance    Comments  [facility] scheduled 6/8/23 @ 1520 with Dr Freddy Gonzalez         Follow-up after recent hospitalization for right pontine CVA  Patient developed symptoms of facial droop and slurred speech along with left-sided weakness while at work  He denies symptoms of headache, vertigo or visual changes  Some residual left leg weakness, improved significantly  Patient was seen in emergency room and hospitalized MRI was consistent with acute/early subacute lacunar infarct in right colin  Unremarkable echo and cardiac monitor  Patient was evaluated by neurology  No indication for statin therapy as per neurology  Patient reports drinking heavily in the preceding weeks prior to this event  He has been sober since  Blood work performed while inpatient revealed elevated LFTs, trending down upon discharge  Patient has no symptoms of right upper quadrant pain, nausea, vomiting or dyspepsia  Patient is here today accompanied by his wife  He reports overall significant improvement, reports intermittent slurred speech, improving day by day  Normal appetite, no difficulty swallowing  No symptoms of chest pain, palpitations, shortness of breath or leg edema  Discharge summary, results of inpatient diagnostic work-up reviewed with patient and his wife  He is supposed to schedule follow-ups with PT, OT and neurology but has been hesitant to proceed due to lack of medical coverage  Review of Systems    Objective     /72   Pulse 69   Temp 98 2 °F (36 8 °C) (Temporal)   Resp 16   Ht 5' 5\" (1 651 m)   Wt 84 kg (185 lb 3 2 oz)   SpO2 98%   BMI 30 82 kg/m²      Physical Exam  Vitals and nursing note reviewed     Constitutional:       General: He is not " in acute distress  Appearance: Normal appearance  He is not ill-appearing  Comments: Slightly slurred speech at times   HENT:      Head: Normocephalic and atraumatic  Eyes:      General: No scleral icterus  Conjunctiva/sclera: Conjunctivae normal    Neck:      Vascular: No carotid bruit  Cardiovascular:      Rate and Rhythm: Normal rate and regular rhythm  Heart sounds: Normal heart sounds  No murmur heard  Pulmonary:      Effort: Pulmonary effort is normal  No respiratory distress  Breath sounds: Normal breath sounds  Abdominal:      General: Bowel sounds are normal  There is no distension  Palpations: Abdomen is soft  Musculoskeletal:         General: Normal range of motion  Cervical back: Neck supple  No rigidity  Right lower leg: No edema  Left lower leg: No edema  Comments: Muscle strength 4/5 bilaterally upper extremities and lower extremities   Skin:     General: Skin is warm  Findings: No rash  Neurological:      General: No focal deficit present  Mental Status: He is alert and oriented to person, place, and time  Cranial Nerves: No cranial nerve deficit  Motor: No weakness  Gait: Gait normal    Psychiatric:         Mood and Affect: Mood normal          Behavior: Behavior normal          Thought Content:  Thought content normal        Medications have been reviewed by provider in current encounter    Kaden Potter MD

## 2023-06-12 ENCOUNTER — TELEPHONE (OUTPATIENT)
Dept: NEUROLOGY | Facility: CLINIC | Age: 48
End: 2023-06-12

## 2023-06-12 NOTE — TELEPHONE ENCOUNTER
Post CVA Discharge Follow Up  Hospitalization: 6/2/23-6/6/23    Called patient to obtain an update  No answer  Unable to leave a voice message since the mail box is not set up yet per dial tone

## 2023-06-12 NOTE — TELEPHONE ENCOUNTER
SLB/6/2-6/6/Stroke      Elpidio Patelhoustonmarla will need follow up in 6 weeks with neurovascular attending or advance practitioner  He will not require outpatient neurological testing  HFU 8/16 @ 2:00 with Sony Clifton in [place]

## 2023-06-13 PROBLEM — R56.9 ALCOHOL WITHDRAWAL SEIZURE (HCC): Status: RESOLVED | Noted: 2021-07-31 | Resolved: 2023-06-13

## 2023-06-13 PROBLEM — Z59.71 DOES NOT HAVE HEALTH INSURANCE: Chronic | Status: ACTIVE | Noted: 2023-06-13

## 2023-06-13 PROBLEM — F10.939 ALCOHOL WITHDRAWAL SEIZURE (HCC): Status: RESOLVED | Noted: 2021-07-31 | Resolved: 2023-06-13

## 2023-06-13 PROBLEM — Z59.89 DOES NOT HAVE HEALTH INSURANCE: Chronic | Status: ACTIVE | Noted: 2023-06-13

## 2023-06-13 PROBLEM — Z59.89 DOES NOT HAVE HEALTH INSURANCE: Status: ACTIVE | Noted: 2023-06-13

## 2023-06-13 PROBLEM — I63.50 RIGHT PONTINE STROKE (HCC): Chronic | Status: ACTIVE | Noted: 2023-06-03

## 2023-06-13 PROBLEM — Z59.71 DOES NOT HAVE HEALTH INSURANCE: Status: ACTIVE | Noted: 2023-06-13

## 2023-06-13 PROBLEM — F10.90 ALCOHOL USE DISORDER: Status: RESOLVED | Noted: 2023-06-03 | Resolved: 2023-06-13

## 2023-06-13 NOTE — ASSESSMENT & PLAN NOTE
· Reportedly application for jerry care/Medicaid was initiated during recent hospitalization  · Patient and his wife are very hesitant to schedule OT and PT due to concern of financial responsibility  · Patient has recently changed the jobs but is not able to return to work at present time due to stroke sequela    He was supposed to be eligible for medical insurance from his employer within next few weeks  · Consult

## 2023-06-13 NOTE — ASSESSMENT & PLAN NOTE
Patient presented with left-sided facial droop and weakness as well as slurred speech  • CTA head/neck negative  · MRI brain showed Acute/early subacute lacunar infarct in the right colin  No acute hemorrhage or mass effect  Nonacute lacunar infarct in the left cerebellum is new since 2021  · No abnormal findings on echocardiogram   No arrhythmias on telemetry  · Neurology recommended 21-day DAPT of Plavix and aspirin followed by aspirin 81 mg daily indefinitely  Currently facial droop has resolved, no left-sided weakness on exam   Pending evaluation by PT/OT  Occasional slurred speech, improving day by day  Patient denies symptoms of headaches or dizziness, no visual changes  He is completing 3-week course of Plavix and aspirin and will switch to mono therapy with aspirin  Patient was evaluated by neurology while inpatient and will schedule follow-up with [address] [place] [address]'s neurology  We discussed importance of risk factor avoidance specifically alcohol abstinence  Patient's cholesterol has improved within past year  Neurology did not feel strongly that he should be on statin

## 2023-06-13 NOTE — ASSESSMENT & PLAN NOTE
Likely due to EtOH use     Patient reports complete sobriety sensation hospitalization    Personal history of gallstones, he is asymptomatic, monitor labs

## 2023-06-13 NOTE — ASSESSMENT & PLAN NOTE
Significant improvement of cholesterol within past 2 years    Neurology did not recommend statin therapy   Continue healthy diet

## 2023-06-13 NOTE — ASSESSMENT & PLAN NOTE
Lab Results   Component Value Date    HGBA1C 6 9 (H) 06/02/2023     Overall acceptable glycemic control  Patient has stopped drinking  He follows healthy diet    Start low-dose of metformin  mg daily, reassess in 3 months

## 2023-06-14 ENCOUNTER — PATIENT OUTREACH (OUTPATIENT)
Dept: FAMILY MEDICINE CLINIC | Facility: CLINIC | Age: 48
End: 2023-06-14

## 2023-06-14 DIAGNOSIS — Z59.89 DOES NOT HAVE HEALTH INSURANCE: Primary | ICD-10-CM

## 2023-06-14 SDOH — ECONOMIC STABILITY - INCOME SECURITY: OTHER PROBLEMS RELATED TO HOUSING AND ECONOMIC CIRCUMSTANCES: Z59.89

## 2023-06-14 NOTE — PROGRESS NOTES
Request received from PCP for Outpatient Social Work Care Manager (OP [facility]) to outreach patient/patient's wife Jaleesa Broussard or sister Diego Coats [phone] to assist with insurance coverage needs as patient has no health insurance at this time  Per chart review, OP CHESTER previously worked with patient and PATHS Department to check status of MA application  Patient was denied for MA due to excess income  OP SWCM provided patient information for Blanca to obtain alternate health insurance plan  Call placed to patient to further discuss  No answer and unable to leave voicemail as mailbox is not set up  Call placed to patient's wife Loren (not on communication consent)  Will request to speak with patient  Loren but was not home with patient at the time  She will call OP SWCM back once home with patient  Awaiting return call

## 2023-06-15 ENCOUNTER — PATIENT OUTREACH (OUTPATIENT)
Dept: FAMILY MEDICINE CLINIC | Facility: CLINIC | Age: 48
End: 2023-06-15

## 2023-06-15 NOTE — PROGRESS NOTES
Message received from patient in response to OP SW's initial outreach attempt  Return call placed to patient to discuss insurance coverage options  Spoke with patient who confirmed he still does not have health insurance coverage  Reminded that patient is over household income limit for Medical Assistance (Medicaid)  Patient unsure how as he does not work/receive income  Patient's wife works; reminded that eligibility for MA is based off of household income  If patient's wife's income is greater than $2,268/month, patient is over income limit for MA  Patient will check with his on this  Discussed Blanca for alternate insurance coverage options  Provided phone number and encouraged patient to call to discuss insurance plans  Will mail [employer] and MA income guideline information for patient to review with his wife  Will follow

## 2023-06-19 NOTE — TELEPHONE ENCOUNTER
Post CVA Discharge Follow Up  Hospitalization: 6/2/23-6/6/23     Called patient to obtain an update  The phone was answered by a male  Requested to speak with Elpidio  The line was then disconnected  Called patient  No answer  Unable to leave a voice message since the mail box has not been set up yet per dial tone  Sent unable to reach letter since this was the third attempt

## 2023-06-21 ENCOUNTER — EVALUATION (OUTPATIENT)
Dept: PHYSICAL THERAPY | Facility: MEDICAL CENTER | Age: 48
End: 2023-06-21

## 2023-06-21 DIAGNOSIS — I63.50 RIGHT PONTINE STROKE (HCC): ICD-10-CM

## 2023-06-21 PROCEDURE — 97162 PT EVAL MOD COMPLEX 30 MIN: CPT | Performed by: PHYSICAL THERAPIST

## 2023-06-21 NOTE — PROGRESS NOTES
PT Evaluation     Today's date: 2023  Patient name: Anel Dickerson  : 1975  MRN: [id number]  Referring provider: Lissy Amezcua PA-C  Dx:   Encounter Diagnosis     ICD-10-CM    1  Right pontine stroke [facility])  I63 50 Ambulatory referral to Physical Therapy            Assessment  Assessment details: Patient is a 51 y o  Male who presents to skilled outpatient PT with complaints of recent CVA resulting in left sided weakness  Patient reports that he suffered CVA approximately 2 weeks ago and when he returned home from the hospital he could barely walk  He arrives today with his wife and no AD  MMT reveals left proximal hip weakness, which is also demonstrated through his 5xSTS time  All other objective measures including TUG, GUEVARA, FGA, 6 MWT, and 10 MWT all score WNL  Despite scoring above his age-related normative values, patient requires treatment to improve his dynamic and static balance specifically in single leg stance and with narrow TATYANA  He also requires additional LE strengthening to 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  Patient will benefit from skilled PT services to improve his strength and help him return to his PLOF  Impairments: Abnormal coordination, Abnormal gait, Abnormal muscle tone, Abnormal or restricted ROM, Activity intolerance, Impaired balance, Impaired physical strength, Lacks appropriate HEP, Poor posture, Poor body mechanics, Pain with function, Safety issue, Weight-bearing intolerance, Abnormal movement, Difficulty understanding, Abnormal muscle firing  Understanding of Dx/Px/POC: Good  Prognosis: Good      Patient verbalized understanding of POC  Please contact me if you have any questions or recommendations  Thank you for the referral and the opportunity to share in [facility] Kirstin's care          Plan  Plan details:   Patient would benefit from: Skilled PT  Planned modality interventions:   Planned therapy interventions: Abdominal trunk stabilization, ADL training, Balance, Balance/WB training, Coordination, Functional ROM exercises, Gait training, HEP, Joint Mobilization, Manual Therapy, Ruiz taping, Motor coordination training, Neuromuscular re-education, Patient education, Postural training, Strengthening, Stretching, Therapeutic activities, Therapeutic exercises, Therapeutic training, Transfer training,   Frequency: 1x/week  Duration in weeks: 12  Plan of Care beginning date: 6/21/23  Plan of Care expiration date: 12 weeks - 9/13/23  Treatment plan discussed with: Patient         Goals  Short Term Goals (4 weeks):    - Patient will improve time on TUG by 2 9 seconds to facilitate improved safety in all ambulation  - Patient will improve scoring on DGI by 2 6 points to progress safety  - Patient will be independent in basic HEP 2-3 weeks  - Patient will improve 5xSTS score by 2 3 seconds to promote improved LE functional strength needed for ADLs  - Patient will complete components of CB&M to promote agility necessary for sports related tasks    Long Term Goals (12 weeks):  - Patient will be independent in a comprehensive home exercise program  - Patient will improve gait speed by 0 18 m/s to improve safety with community ambulation  - Patient will improve GUEVARA by 6 points to facilitate return to safe independent ambulation  - Patient will improve scoring on FGA by 4 points to progress safety with dynamic tasks  - Patient will be able to demonstrate HT in gait without veering  - Patient will improve 6 Minute Walk Test score by 190 feet to promote improved cardiovascular endurance  - Patient will report 50% reduction in near falls in order to improve safety with functional tasks and reduce his risk for falls  - Patient will report going on walks at least 3 days per week to promote independence and improved cardiovascular endurance  - Patient will be able to ascend/descend stairs reciprocally without UE assist to promote independence and safety with ADLs  - Patient will report 50% reduction in near falls when ambulating on uneven terrain      Cut off score    All date taken from APTA Neuro Section or Rehab Measures      Diez:  Aki et al , 2018  Emanuel [address]: 2 7 pts    Tyree et al , 2011  Cut-off score: 45/56    Chronic CVA  < 44/56 high risk for falls (Aki et al , 2018)  < 47 5/56 slow walker status (Jelly sandoval al , 2011) 5xSTS: Galilea 2010  MDC: 2 3 sec  Age Norms:  60-69: 11 1 sec  70-79: 12 6 sec  80-89: 14 8 sec    Kyle 2010, Chronic Stroke  Chronic CVA: 12 sec   TUG  Noy , 2005  MDC: 2 9 sec    Cut off score:  >13 5 sec community dwelling adults  >32 2 frail elderly  <20 I for basic transfers  >30 dependent on transfers 10 Meter Walk Test:   Chrisney  al , 2006  Small meaningful change: 0 06 m/s  Substantial meaningful change: 0 14 m/s  MCID: 0 16 m/s    < 0 4 m/s household ambulators  0 4 - 0 8 m/s limited community ambulators  > 0 8 m/s community ambulators   FGA: Anastacia et al , 2010  MCID: 4 2 pts  Geriatrics/community < 22/30 fall risk  Geriatrics/community < 20/30 unexplained falls DGI  MDC: vestibular - 4 pts  MDC: geriatric/community - 3 pts  Falls risk <19/24   6 Minute Walk Test  Garcia et al , 2006  MDC: 60 98 m (200 01 ft)    Livier Scanlon, Eric, & Cut off, 2012  MCID: 34 4 m    Age Norms  60-69: M - 1876 ft   F - 1765 ft  70-79: M - 1729 ft   F - 1545 ft  80-89: M - 1368 ft   F - 1286 ft Modified Lizeth  0: No increase in tone  1: Catch and release or min resistance at end range  1+: Catch f/b min resistance throughout remainder (< half ROM)  2: Easily moved, but more marked tone throughout most ROM  3: Significant tone, PROM difficult  4: Rigid   MiniBest: Dionte et al , 2013  CVA < 17 5 fall risk Pass (Acute CVA)  MDC: 1 8 points (acute), 3 2 points (chronic)         Subjective    History of Present Illness  Mechanism of injury: Patient reports recent stroke that has left him with left sided weakness  Since returning home from the hospital he reports improvements but has not returned to 100%  He would like to return to work and his PLOF  Primary AD: none  Assist level at home: none  WC usage: none  PLOF: independent    Pain  Current pain ratin/10      Objective     Vitals  - BP: 132/80    LE MMT  - R Hip Flexion: 5/5  - L Hip Flexion: 5  - R Hip Abduction: 5  - L Hip abduction: 5  - R Hip adduction: /5  - L Hip adduction: 5  - R Knee Extension: 5  - L Knee Extension:   - R Knee Flexion:   - L Knee Flexion:   - R Ankle DF:   - L Ankle DF:   - R Ankle PF:   - L Ankle PF:     Sensation  - Light touch: WNL  - Deep pressure:  WNL    Coordination  - Dysmetria: WNL  - Dysdiadochokinesia: WNL  - Alternating Toe Taps: slowed LLE          Outcome Measures Initial Eval  23        5xSTS 12 sec        TUG 8 9 sec        10 meter 1 52 m/s        GUEVARA 53/56        FGA 27/30        DGI /24        MiniBEST /28        PASS /36        6MWT 1650 ft        CB&M /96                      Precautions:   Past Medical History:   Diagnosis Date   • Acute lymphangitis of left upper limb 2016   • Alcohol withdrawal seizure ([facility] [address] ) 2021   • Elevated LFTs 2021   • Hypertension

## 2023-06-26 DIAGNOSIS — E11.9 TYPE 2 DIABETES MELLITUS WITHOUT COMPLICATION, WITHOUT LONG-TERM CURRENT USE OF INSULIN (HCC): ICD-10-CM

## 2023-06-26 RX ORDER — METFORMIN HYDROCHLORIDE 500 MG/1
500 TABLET, EXTENDED RELEASE ORAL
Qty: 90 TABLET | Refills: 0 | Status: SHIPPED | OUTPATIENT
Start: 2023-06-26

## 2023-06-28 ENCOUNTER — OFFICE VISIT (OUTPATIENT)
Dept: PHYSICAL THERAPY | Facility: MEDICAL CENTER | Age: 48
End: 2023-06-28

## 2023-06-28 DIAGNOSIS — I63.50 RIGHT PONTINE STROKE (HCC): Primary | ICD-10-CM

## 2023-06-28 PROCEDURE — 97112 NEUROMUSCULAR REEDUCATION: CPT | Performed by: PHYSICAL THERAPIST

## 2023-06-28 NOTE — PROGRESS NOTES
Daily Note     Today's date: 2023  Patient name: Letitia Sotelo  : 1975  MRN: [id number]  Referring provider: Maryan Bertrand PA-C  Dx:   Encounter Diagnosis     ICD-10-CM    1  Right pontine stroke (HCC)  I63 50                      Subjective: Patient reports to treatment feeling much better than last session      Objective: See treatment diary below    NMR:  - Standing marches  - Standing hip abduction  - Standing hip extension  - STS w/ 10# KB  - Step ups w/ high knee drive and KB (#)  - Sidestepping w/ squats using 10# KB    HEP: marching, hip abduction, hip extension, STS, Step ups w/ high knee drive, sidestepping w/ squats      Assessment: Patient tolerated treatment well  Began to challenge patient with dynamic balance and LE strengthening exercises  Utilized timed reps today to maintain higher intensity pacing  Provided patient with HEP, reviewed all exercises, and discussed safety of exercising at home, patient verbalized agreement  Patient will benefit from skilled PT services to improve his strength and help him return to his PLOF  Plan: Continue per plan of care            Outcome Measures Initial Eval  23        5xSTS 12 sec        TUG 8 9 sec        10 meter 1 52 m/s        GUEVARA 53/56        FGA 27/30        DGI /24        MiniBEST /28        PASS /36        6MWT 1650 ft        CB&M /96                      Precautions:   Past Medical History:   Diagnosis Date   • Acute lymphangitis of left upper limb 2016   • Alcohol withdrawal seizure ([facility] [address] ) 2021   • Elevated LFTs 2021   • Hypertension

## 2023-07-05 ENCOUNTER — OFFICE VISIT (OUTPATIENT)
Dept: PHYSICAL THERAPY | Facility: MEDICAL CENTER | Age: 48
End: 2023-07-05

## 2023-07-05 ENCOUNTER — PATIENT OUTREACH (OUTPATIENT)
Dept: FAMILY MEDICINE CLINIC | Facility: CLINIC | Age: 48
End: 2023-07-05

## 2023-07-05 DIAGNOSIS — I63.50 RIGHT PONTINE STROKE (HCC): Primary | ICD-10-CM

## 2023-07-05 PROCEDURE — 97112 NEUROMUSCULAR REEDUCATION: CPT | Performed by: PHYSICAL THERAPIST

## 2023-07-05 NOTE — PROGRESS NOTES
Daily Note     Today's date: 2023  Patient name: Joselyn Bowers  : 1975  MRN: [id number]  Referring provider: Rhina Escalera PA-C  Dx:   Encounter Diagnosis     ICD-10-CM    1. Right pontine stroke (HCC)  I63.50                      Subjective: Patient reports to treatment feeling much better than last session      Objective: See treatment diary below    NMR: (2 min on/off)  - STS w/ 10# KB(2 sets)  - Step ups w/ high knee drive and KB (#)  - Sidestepping w/ squats using 10# KB  - Marching w/ tidal tank (10#)  - FWD/BWD farmer carry w/ 2 tiny tanks   - Push/pull sled (100#)    HEP: marching, hip abduction, hip extension, STS, Step ups w/ high knee drive, sidestepping w/ squats      Assessment: Patient tolerated treatment well. Continued to utilized timed reps today to maintain higher intensity pacing. Further challenged patient dynamic balance and LE strength with the addition of tidal tank today to provide weight and perturbations. Discussed with patient importance of HEP and maintaining compliance, patient verbalized agreement. Patient will benefit from skilled PT services to improve his strength and help him return to his PLOF. Plan: Continue per plan of care.           Outcome Measures Initial Eval  23        5xSTS 12 sec        TUG 8.9 sec        10 meter 1.52 m/s        GUEVARA 53/56        FGA 27/        DGI /24        MiniBEST /28        PASS /36        6MWT 1650 ft        CB&M /96                      Precautions:   Past Medical History:   Diagnosis Date   • Acute lymphangitis of left upper limb 2016   • Alcohol withdrawal seizure ([address]) 2021   • Elevated LFTs 2021   • Hypertension

## 2023-07-05 NOTE — PROGRESS NOTES
Call placed to patient to ask if he outreached Blanca to discuss insurance coverage plan options. No answer and unable to leave voicemail as mailbox is not set up. Will send message through [employer] and attempt additional outreach at later date.

## 2023-07-07 ENCOUNTER — TELEPHONE (OUTPATIENT)
Dept: NEUROLOGY | Facility: CLINIC | Age: 48
End: 2023-07-07

## 2023-07-07 NOTE — TELEPHONE ENCOUNTER
Post CVA Discharge Follow Up  Hospitalization: 6/2/23-6/6/23     Called patient to obtain an update. No answer. Unable to leave a voice message since the mail box is not set up yet per dial tone.

## 2023-07-12 ENCOUNTER — OFFICE VISIT (OUTPATIENT)
Dept: PHYSICAL THERAPY | Facility: MEDICAL CENTER | Age: 48
End: 2023-07-12

## 2023-07-12 ENCOUNTER — PATIENT OUTREACH (OUTPATIENT)
Dept: FAMILY MEDICINE CLINIC | Facility: CLINIC | Age: 48
End: 2023-07-12

## 2023-07-12 DIAGNOSIS — I63.50 RIGHT PONTINE STROKE (HCC): Primary | ICD-10-CM

## 2023-07-12 PROCEDURE — 97112 NEUROMUSCULAR REEDUCATION: CPT | Performed by: PHYSICAL THERAPIST

## 2023-07-12 NOTE — LETTER
[address] [address]    Re: Unable to Reach   7/12/2023       Dear Margie Jensen am a 1960 [facility]  and wanted to be certain you had information to contact me since I have not been able to reach you. Should you desire assistance with or have questions about non-medical aspects of your care such as [but not limited to] medical insurance, housing, transportation, material needs, or emergency needs, please return my call. If I do not have an answer I will assist you in finding the appropriate agency or individual who can help. Please feel free to contact me at [phone]. Thank You.     Sincerely,         Santana Garvey

## 2023-07-12 NOTE — PROGRESS NOTES
Additional call made to patient to discuss [facility] health insurance coverage plan options and ask if she outreached the Raffi Edmondson. No answer and unable to leave voicemail as mailbox is not set up. Will mail unable to reach letter and close case at this time.

## 2023-07-12 NOTE — PROGRESS NOTES
Daily Note     Today's date: 2023  Patient name: Kaye Kebede  : 1975  MRN: [id number]  Referring provider: Snehal Garcia PA-C  Dx:   Encounter Diagnosis     ICD-10-CM    1. Right pontine stroke (HCC)  I63.50                      Subjective: Patient reports to treatment feeling much better than last session      Objective: See treatment diary below    NMR: (2 min on/off)  - FWD lunge w/ 20# MB  - Sidestepping w/ squats using 20# MB  - Marching w/ tidal tank (10#)  - FWD/BWD farmer carry w/ 2 tiny tanks   - Heel/toe walk w/ 2 TT  - Tandem ambulation on foam beam w/ 1 TT  - Push/pull sled (100#)    HEP: marching, hip abduction, hip extension, STS, Step ups w/ high knee drive, sidestepping w/ squats      Assessment: Patient tolerated treatment well. Continued to utilized timed reps today to maintain higher intensity pacing. Dynamic balance exercises added today to include foam beam and tidal tank to provide perturbations. Patient reports that he is approximately 90% returned to PLOF. Patient will benefit from skilled PT services to improve his strength and help him return to his PLOF. Plan: Continue per plan of care.           Outcome Measures Initial Eval  23        5xSTS 12 sec        TUG 8.9 sec        10 meter 1.52 m/s        GUEVARA 53/56        FGA /        DGI /24        MiniBEST /28        PASS /36        6MWT 1650 ft        CB&M /96                      Precautions:   Past Medical History:   Diagnosis Date   • Acute lymphangitis of left upper limb 2016   • Alcohol withdrawal seizure ([address]) 2021   • Elevated LFTs 2021   • Hypertension

## 2023-07-12 NOTE — TELEPHONE ENCOUNTER
Post CVA Discharge Follow Up  Hospitalization: 6/2/23-6/6/23    Called patient to obtain an update. No answer. Unable to leave a voice message since the mail box has not been set up yet per dial tone.

## 2023-07-13 LAB
EXTERNAL CREATININE: 0.74
EXTERNAL EGFR: 111

## 2023-07-14 NOTE — TELEPHONE ENCOUNTER
Post CVA Discharge Follow Up  Hospitalization: 6/2/23-6/6/23    Called patient. Since discharge, he denies experiencing any new or worsening stroke-like symptoms. Reports he is 90 percent back to his usual baseline. Patient reports he continues to have the following symptoms: left sided weakness. He claims symptoms are improving since discharge. Reports he is feeling better each day. He is ambulating independently as well as preforming his own ADLs. Patient manages his own medications, appointments, and affairs. Reviewed appointments - patient successfully followed up with PCP. Scheduled to see neurology on 8/16/23. Reminded patient of his upcoming neurology appointment. Reports participating with PT. Reviewed medications with him. There have not been any medication changes since discharge from the hospital. Reports having no difficulties obtaining medications. Reports he is taking as prescribed with no medication side effects or signs of bleeding. During this call, we reviewed stroke type, symptoms, personal risk factors and management, medications, and resources. Patient verbalizes understanding. Patient was previously given the stroke education booklet. As for risk factors, patient reports monitoring his BS at home and how it typically ranges from the low 100s to 150s. He relies on checking his BP at doctor office visits. Continues to be an everyday smoker. Smoking cessation reviewed, patient determined to quit. He has reduced the amount of smoking since discharge. Encouraged patient to follow a low salt / low cholesterol / diabetic friendly diet. Reports he is working on his diet. Encouraged physical activity as tolerated. Reports walking two miles every morning. All of his questions were address. At the conclusion of the conversation, patient denies having any further questions or concerns.

## 2023-07-19 ENCOUNTER — OFFICE VISIT (OUTPATIENT)
Dept: PHYSICAL THERAPY | Facility: MEDICAL CENTER | Age: 48
End: 2023-07-19

## 2023-07-19 DIAGNOSIS — I63.50 RIGHT PONTINE STROKE (HCC): Primary | ICD-10-CM

## 2023-07-19 PROCEDURE — 97112 NEUROMUSCULAR REEDUCATION: CPT | Performed by: PHYSICAL THERAPIST

## 2023-07-19 NOTE — PROGRESS NOTES
Daily/Discharge   Note     Today's date: 2023  Patient name: Tahira Peck  : 1975  MRN: [id number]  Referring provider: Bina Gaines PA-C  Dx:   Encounter Diagnosis     ICD-10-CM    1.  Right pontine stroke (HCC)  I63.50                      Subjective: Patient reports to treatment feeling much better than last session      Objective: See treatment diary below    NMR: (3 min on/2 min off)  - FWD lunge w/ 20# MB  - Sidestepping w/ squats using 20# MB  - Marching w/ tidal tank (10#)  - FWD/BWD farmer carry w/ 2 tiny tanks   - KB swings w/ 5# KB  - Tandem ambulation on foam beam w/ 1 TT  - Push/pull sled (100#)    HEP: marching, hip abduction, hip extension, STS, Step ups w/ high knee drive, sidestepping w/ squats, FWD/LAT lunge, tandem ambulation, KB swings    Goals  Short Term Goals (4 weeks):    - Patient will improve time on TUG by 2.9 seconds to facilitate improved safety in all ambulation- NT  - Patient will improve scoring on DGI by 2.6 points to progress safety- NT  - Patient will be independent in basic HEP 2-3 weeks- MET  - Patient will improve 5xSTS score by 2.3 seconds to promote improved LE functional strength needed for ADLs- NT  - Patient will complete components of CB&M to promote agility necessary for sports related tasks- NT    Long Term Goals (12 weeks):- NT  - Patient will be independent in a comprehensive home exercise program  - Patient will improve gait speed by 0.18 m/s to improve safety with community ambulation  - Patient will improve GUEVARA by 6 points to facilitate return to safe independent ambulation  - Patient will improve scoring on FGA by 4 points to progress safety with dynamic tasks  - Patient will be able to demonstrate HT in gait without veering  - Patient will improve 6 Minute Walk Test score by 190 feet to promote improved cardiovascular endurance  - Patient will report 50% reduction in near falls in order to improve safety with functional tasks and reduce his risk for falls  - Patient will report going on walks at least 3 days per week to promote independence and improved cardiovascular endurance  - Patient will be able to ascend/descend stairs reciprocally without UE assist to promote independence and safety with ADLs  - Patient will report 50% reduction in near falls when ambulating on uneven terrain    Assessment: Patient tolerated treatment well. Continued to utilized timed reps today to maintain higher intensity pacing. Updated patient HEP, reviewed exercises, and discussed safety of exercising at home, patient verbalized agreement. Based on patient request and overall subjective improvement, he will be discharged from skilled PT services.          Plan: Discharge         Outcome Measures Initial Eval  6/21/23        5xSTS 12 sec        TUG 8.9 sec        10 meter 1.52 m/s        GUEVARA 53/56        FGA 27/30        DGI /24        MiniBEST /28        PASS /36        6MWT 1650 ft        CB&M /96                      Precautions:   Past Medical History:   Diagnosis Date   • Acute lymphangitis of left upper limb 11/14/2016   • Alcohol withdrawal seizure ([address]) 7/31/2021   • Elevated LFTs 7/31/2021   • Hypertension

## 2023-07-21 ENCOUNTER — TELEPHONE (OUTPATIENT)
Dept: FAMILY MEDICINE CLINIC | Facility: CLINIC | Age: 48
End: 2023-07-21

## 2023-07-21 DIAGNOSIS — E78.2 MIXED HYPERLIPIDEMIA: ICD-10-CM

## 2023-07-21 DIAGNOSIS — E11.9 TYPE 2 DIABETES MELLITUS WITHOUT COMPLICATION, WITHOUT LONG-TERM CURRENT USE OF INSULIN (HCC): Primary | ICD-10-CM

## 2023-07-21 NOTE — TELEPHONE ENCOUNTER
Please contact patient. I sent him a "[employer]t message regarding results of recent blood work. Patient should repeat labs prior to his next office visit, orders placed, please mail.     Thank you

## 2023-08-16 ENCOUNTER — OFFICE VISIT (OUTPATIENT)
Dept: NEUROLOGY | Facility: CLINIC | Age: 48
End: 2023-08-16

## 2023-08-16 VITALS
HEIGHT: 65 IN | DIASTOLIC BLOOD PRESSURE: 100 MMHG | OXYGEN SATURATION: 99 % | RESPIRATION RATE: 20 BRPM | WEIGHT: 183 LBS | BODY MASS INDEX: 30.49 KG/M2 | TEMPERATURE: 98.3 F | HEART RATE: 85 BPM | SYSTOLIC BLOOD PRESSURE: 162 MMHG

## 2023-08-16 DIAGNOSIS — Z86.73 HISTORY OF STROKE: Primary | ICD-10-CM

## 2023-08-16 DIAGNOSIS — Z72.0 TOBACCO USE: ICD-10-CM

## 2023-08-16 DIAGNOSIS — I10 HYPERTENSION: ICD-10-CM

## 2023-08-16 DIAGNOSIS — E78.2 MIXED HYPERLIPIDEMIA: ICD-10-CM

## 2023-08-16 DIAGNOSIS — E11.9 TYPE 2 DIABETES MELLITUS WITHOUT COMPLICATION, WITHOUT LONG-TERM CURRENT USE OF INSULIN (HCC): ICD-10-CM

## 2023-08-16 NOTE — PROGRESS NOTES
Patient ID: Mariela Thomason is a 51 y.o. male who presents to the [address] [place] [address]. Assessment/Plan:    History of Stroke:    I had the pleasure of seeing Waleska Pierre today in the office at [facility]. He is presenting today for a hospital follow-up in regard to his most recent right pontine stroke. At this moment in time, he is doing very well and not experiencing any new strokelike symptoms. The deficits that he had from his stroke include some minor aphasia, slight left upper extremity weakness, and slight left lower extremity weakness. However, upon neurologic examination did not appear that he had much discrepancy in strength of the left upper extremity and left lower extremity compared to the right upper and lower extremity at this time. Rest of the patient's neurologic exam was nonfocal and mostly normal at this time. Discussed with the patient extensively about risk factor control and management in regards to preventing strokes in the future. He is no longer doing PT/OT and has graduated, but he is still continuing with the exercises at home. He has even been trying to run at home which I advised him against for the time being while his left leg is still recovering.    -Patient is currently not taking any statin medication for management of his cholesterol. His primary care provider had recently collected additional labs for the patient at this time. He is going to see his primary care provider again in September he states. She is going to review all of his labs and see if his liver enzymes are still elevated, if they are not then he is certainly a candidate to receive some kind of statin therapy for his cholesterol. Will defer to the patient's primary care since the work-up has already been started for having the patient start some kind of statin medication for his cholesterol.  -Continue to monitor blood pressure at home.   Patient's blood pressure in the office today was 162/100, which patient states that this is significantly high for him. After talking with the patient he states that his blood pressure is usually 801 or 683 systolic at home for the most part, he states that his blood pressure is usually never this high. He is not currently taking any medication for his blood pressure and was discontinued because his blood pressure was going too low previously. I just recommend that the patient keep a close eye on his blood pressure when he is at home and if he is noticing that his blood pressure is creeping into the 189 or 203 systolic range that he talks to his primary care provider about this.  -Encouraged and talk to the patient about potential smoking cessation. At this point in time, the patient is not willing to quit smoking right at the moment. He states that this is something that he is going to consider in the future. He states that he has already quit alcohol after he had his most recent stroke which is a big milestone for him. - For ongoing stroke prevention continue: Aspirin 81 mg once daily  - Discussed the importance of antiplatelet management with the patient to prevent future strokes. - Recommend to check blood pressure occasionally away from the doctor's office to make sure that those numbers are typically less than 130/80. If they are frequently higher than that, we recommend checking a little more often and to follow up with primary care team   - Will defer to primary care team for monitoring of cholesterol panel and blood sugar numbers with target LDL cholesterol of less than 70 and hemoglobin A1c less than 7%  - Recommend following a low salt, mediterranean diet   - Recommend routine physical exercise as tolerated     We will plan for him to return to the office in 6 months time to see on of the APPs or Dr. Darrell Esparza but would be happy to see him sooner if the need should arise.   If he has any symptoms concerning for TIA or stroke including sudden painless loss of vision or double vision, difficulty speaking or swallowing, vertigo/room spinning that does not quickly resolve, or weakness/numbness/loss of coordination affecting 1 side of the face or body he should proceed by ambulance to the nearest emergency room immediately. Subjective:    HPI      For Review/Hospital Course:    "51year old male with HTN, HLD, DM type 2, prior seizure, presumed to be alcohol withdrawal related, and alcohol abuse, with last drink being 1 week ago, presented to the ED for evaluation of left facial droop, dysarthria, and gait unsteadiness.      BP on arrival 208/117. CTA head and neck negative for significant stenosis or acute pathology. MRI brain confirmed a right pontine infarct.      Suspect etiology to be secondary to small vessel disease." - per Lady Feliz PA-C    Patient was recommended to continue with dual antiplatelet therapy of aspirin 81 mg and Plavix 75 mg for 3 weeks then the patient was to continue on aspirin 81 mg once daily monotherapy afterwards. Patient was recommended to discontinue statin. Due to the fact his LDL was within normal range and has elevated LFTs. Could consider low-dose statin in the future. Patient's echocardiogram performed on 06/04/2023 revealed an ejection fraction of 60% and did not normal-sized left and right atrium. Interval History:      New stroke symptoms/residual symptoms:    Any new, sudden onset weakness, numbness, facial droop, slurred speech, difficulty speaking, trouble swallowing, persistent vertigo, or sudden double vision or vision loss? No new stroke like symptoms    Residual symptoms include: weakness of the left UE/LE: lower extremity and aphasia    Stroke Etiology and Risk Factor modification:     This was a(n) lacunar stroke, most likely related to Small Vessel/microvascular    Stroke risk factors were evaluated including: Type 2 diabetes mellitus, hypertension, tobacco use    AP/AC therapy: Aspirin 81 mg once daily. No bleeding or bruising issues with aspirin. Statin therapy: No statin therapy indicated at this time due to the fact the patient's LDL is within normal limits and he has had recently elevated LFTs. Patient is going to have a metabolic panel scheduled at his primary care in September, if his LFTs are within normal range could certainly consider low-dose statin. Blood pressure today and as of late: 162/100, not normally this high for blood pressure at home. Reports 413 or 115 systolic blood pressure when at home. Most recent LDL: 88 mg/dL    Most recent hemoglobin A1C: 6.9%    Cardiology evaluation? Any cardiac monitoring required?:  Does not currently follow with cardiology. Endocrinology evaluation? Following proper glycemic treatment/diet? Not currently following with endocrinology. Lifestyle history/modifications:    Diet/Exercise regimen: Eating a lot less than usual, eats homemade food and has a lot of mashed potatoes and steak. Any physical therapy, occupational therapy or speech therapy performed/required at this time?: Graduated PT/OT and does exercise at home. Any difficulty with sleep? No falling asleep at this moment. Any history of sleep apnea apnea? CPAP compliance?: Sometimes snoring, no trouble with breathing at this time. Post-stroke depression/anxiety? No depression/anxiety    Any history of smoking? 5 cigarettes a day, around [age]years old is when the patient reports he started smoking. Not currently willing to reduce or quit smoking. Has reduced alcohol intake since most recent stroke.       Lab Results   Component Value Date/Time    CHOLESTEROL 211 (H) 06/03/2023 04:16 AM     Lab Results   Component Value Date/Time    TRIG 216 (H) 06/03/2023 04:16 AM     Lab Results   Component Value Date/Time    HDL 80 06/03/2023 04:16 AM     Lab Results   Component Value Date/Time    LDLCALC 88 06/03/2023 04:16 AM       Lab Results   Component Value Date/Time    HGBA1C 6.9 (H) 06/02/2023 09:31 PM     Lab Results   Component Value Date/Time     06/02/2023 09:31 PM           Past Medical History:   Diagnosis Date   • Acute lymphangitis of left upper limb 11/14/2016   • Alcohol withdrawal seizure ([address]) 07/31/2021   • Diabetes mellitus ([address]) 06/02/2023   • Difficulty walking 06/02/2023   • Elevated LFTs 07/31/2021   • Hypertension    • Movement disorder 06/02/2023   • Stroke ([address]) 06/02/2023       Current Outpatient Medications:   •  Alcohol Swabs 70 % PADS, May substitute brand based on insurance coverage. Check glucose BID., Disp: 100 each, Rfl: 0  •  aspirin 81 mg chewable tablet, Chew 1 tablet (81 mg total) daily Do not start before June 7, 2023., Disp: , Rfl: 0  •  Blood Glucose Monitoring Suppl (OneTouch Verio Reflect) w/Device KIT, May substitute brand based on insurance coverage. Check glucose BID., Disp: 1 kit, Rfl: 0  •  glucose blood (OneTouch Verio) test strip, May substitute brand based on insurance coverage. Check glucose BID., Disp: 100 each, Rfl: 0  •  clopidogrel (PLAVIX) 75 mg tablet, Take 1 tablet (75 mg total) by mouth daily for 18 days Do not start before June 7, 2023., Disp: 18 tablet, Rfl: 0  •  folic acid (FOLVITE) 1 mg tablet, Take 1 tablet (1 mg total) by mouth daily Do not start before June 7, 2023. (Patient not taking: Reported on 8/16/2023), Disp: 30 tablet, Rfl: 0  •  metFORMIN (GLUCOPHAGE-XR) 500 mg 24 hr tablet, Take 1 tablet (500 mg total) by mouth daily with breakfast (Patient not taking: Reported on 8/16/2023), Disp: 90 tablet, Rfl: 0  •  methocarbamol (ROBAXIN) 500 mg tablet, Take 1 tablet (500 mg total) by mouth every 6 (six) hours as needed for muscle spasms (Patient not taking: Reported on 8/16/2023), Disp: 30 tablet, Rfl: 0  •  nicotine (NICODERM CQ) 14 mg/24hr TD 24 hr patch, Place 1 patch on the skin over 24 hours daily Do not start before June 7, 2023.  (Patient not taking: Reported on 8/16/2023), Disp: 28 patch, Rfl: 0  •  OneTouch Delica Lancets 45Y MISC, May substitute brand based on insurance coverage. Check glucose BID. (Patient not taking: Reported on 8/16/2023), Disp: 100 each, Rfl: 0  •  thiamine 100 MG tablet, Take 1 tablet (100 mg total) by mouth daily Do not start before June 7, 2023. (Patient not taking: Reported on 8/16/2023), Disp: 30 tablet, Rfl: 0     Objective:    Physical Exam:                                                                 Vitals:            Constitutional:    /100 (BP Location: Left arm, Patient Position: Sitting, Cuff Size: Standard)   Pulse 85   Temp 98.3 °F (36.8 °C) (Temporal)   Resp 20   Ht 5' 5" (1.651 m)   Wt 83 kg (183 lb)   SpO2 99%   BMI 30.45 kg/m²   BP Readings from Last 3 Encounters:   08/16/23 162/100   06/08/23 124/72   06/06/23 142/86     Pulse Readings from Last 3 Encounters:   08/16/23 85   06/08/23 69   06/06/23 64         Well developed, well nourished, well groomed. No dysmorphic features. Psychiatric:  Normal behavior and appropriate affect        Neurological Examination:     Mental status/cognitive function:   Orientated to time, place and person. Recent and remote memory intact. Attention span and concentration as well as fund of knowledge are appropriate for age. Normal language and spontaneous speech. Cranial Nerves:  II-visual fields full. III, IV, VI-Pupils were equal, round, and reactive to light and accomodation. Extraocular movements were full and conjugate without nystagmus. Conjugate gaze, normal smooth pursuits, normal saccades   V-facial sensation symmetric. VII-facial expression symmetric, intact forehead wrinkle, strong eye closure, symmetric smile    VIII-hearing grossly intact bilaterally   IX, X-palate elevation symmetric, no dysarthria. XI-shoulder shrug strength intact    XII-tongue protrusion midline. Motor Exam: symmetric bulk and tone throughout, no pronator drift.  Power/strength 5/5 bilateral upper and lower extremities, very slight left upper and left lower extremity weakness compared to right upper extremity and right lower extremity strength. no atrophy, fasciculations or abnormal movements noted. Sensory: grossly intact light touch in all extremities. Reflexes: brachioradialis 2+, biceps 2+, knee 2+, bilaterally  Coordination: Finger nose finger intact bilaterally, no apparent dysmetria, ataxia or tremor noted  Gait: steady casual and tandem gait. ROS:    Review of Systems   Constitutional: Negative for appetite change, fatigue and fever. HENT: Negative. Negative for hearing loss, tinnitus, trouble swallowing and voice change. Eyes: Negative. Negative for photophobia, pain and visual disturbance. Respiratory: Negative. Negative for shortness of breath. Cardiovascular: Negative. Negative for palpitations. Gastrointestinal: Negative. Negative for nausea and vomiting. Endocrine: Negative. Negative for cold intolerance. Genitourinary: Negative. Negative for dysuria, frequency and urgency. Musculoskeletal: Negative for back pain, gait problem, myalgias and neck pain. Skin: Negative. Negative for rash. Allergic/Immunologic: Negative. Neurological: Negative. Negative for dizziness, tremors, seizures, syncope, facial asymmetry, speech difficulty, weakness, light-headedness, numbness and headaches. Hematological: Negative. Does not bruise/bleed easily. Psychiatric/Behavioral: Negative. Negative for confusion, hallucinations and sleep disturbance. All other systems reviewed and are negative.       I have spent 30 minutes today on this case including chart review, performing history and exam, patient counseling, and documentation/communication      Abelardo June PA-C  8/16/2023 2:30 PM

## 2023-08-16 NOTE — PATIENT INSTRUCTIONS
History of Stroke:    I had the pleasure of seeing Brown Womack today in the office at [facility] neurolog[facility]. He is presenting today for a hospital follow-up in regard to his most recent right pontine stroke. At this moment in time, he is doing very well and not experiencing any new strokelike symptoms. The deficits that he had from his stroke include some minor aphasia, slight left upper extremity weakness, and slight left lower extremity weakness. However, upon neurologic examination did not appear that he had much discrepancy in strength of the left upper extremity and left lower extremity compared to the right upper and lower extremity at this time. Rest of the patient's neurologic exam was nonfocal and mostly normal at this time. Discussed with the patient extensively about risk factor control and management in regards to preventing strokes in the future. He is no longer doing PT/OT and has graduated, but he is still continuing with the exercises at home. He has even been trying to run at home which I advised him against for the time being while his left leg is still recovering.    -Patient is currently not taking any statin medication for management of his cholesterol. His primary care provider had recently collected additional labs for the patient at this time. He is going to see his primary care provider again in September he states. She is going to review all of his labs and see if his liver enzymes are still elevated, if they are not then he is certainly a candidate to receive some kind of statin therapy for his cholesterol. Will defer to the patient's primary care since the work-up has already been started for having the patient start some kind of statin medication for his cholesterol.  -Continue to monitor blood pressure at home. Patient's blood pressure in the office today was 162/100, which patient states that this is significantly high for him.   After talking with the patient he states that his blood pressure is usually 455 or 720 systolic at home for the most part, he states that his blood pressure is usually never this high. He is not currently taking any medication for his blood pressure and was discontinued because his blood pressure was going too low previously. I just recommend that the patient keep a close eye on his blood pressure when he is at home and if he is noticing that his blood pressure is creeping into the 117 or 919 systolic range that he talks to his primary care provider about this.  -Encouraged and talk to the patient about potential smoking cessation. At this point in time, the patient is not willing to quit smoking right at the moment. He states that this is something that he is going to consider in the future. He states that he has already quit alcohol after he had his most recent stroke which is a big milestone for him. - For ongoing stroke prevention continue: Aspirin 81 mg once daily  - Discussed the importance of antiplatelet management with the patient to prevent future strokes. - Recommend to check blood pressure occasionally away from the doctor's office to make sure that those numbers are typically less than 130/80. If they are frequently higher than that, we recommend checking a little more often and to follow up with primary care team   - Will defer to primary care team for monitoring of cholesterol panel and blood sugar numbers with target LDL cholesterol of less than 70 and hemoglobin A1c less than 7%  - Recommend following a low salt, mediterranean diet   - Recommend routine physical exercise as tolerated     We will plan for him to return to the office in 6 months time to see on of the APPs or Dr. Ernesto Modi but would be happy to see him sooner if the need should arise.   If he has any symptoms concerning for TIA or stroke including sudden painless loss of vision or double vision, difficulty speaking or swallowing, vertigo/room spinning that does not quickly resolve, or weakness/numbness/loss of coordination affecting 1 side of the face or body he should proceed by ambulance to the nearest emergency room immediately.

## 2023-08-31 LAB — HBA1C MFR BLD HPLC: 6.5 %

## 2023-09-03 DIAGNOSIS — E11.9 TYPE 2 DIABETES MELLITUS WITHOUT COMPLICATION, WITHOUT LONG-TERM CURRENT USE OF INSULIN (HCC): ICD-10-CM

## 2023-09-05 RX ORDER — METFORMIN HYDROCHLORIDE 500 MG/1
500 TABLET, EXTENDED RELEASE ORAL
Qty: 90 TABLET | Refills: 0 | Status: SHIPPED | OUTPATIENT
Start: 2023-09-05 | End: 2023-09-08

## 2023-09-08 ENCOUNTER — OFFICE VISIT (OUTPATIENT)
Dept: FAMILY MEDICINE CLINIC | Facility: CLINIC | Age: 48
End: 2023-09-08

## 2023-09-08 VITALS
OXYGEN SATURATION: 96 % | RESPIRATION RATE: 18 BRPM | WEIGHT: 181.2 LBS | BODY MASS INDEX: 30.19 KG/M2 | SYSTOLIC BLOOD PRESSURE: 146 MMHG | HEART RATE: 87 BPM | HEIGHT: 65 IN | TEMPERATURE: 99.3 F | DIASTOLIC BLOOD PRESSURE: 100 MMHG

## 2023-09-08 DIAGNOSIS — E87.5 HYPERKALEMIA: ICD-10-CM

## 2023-09-08 DIAGNOSIS — E11.9 TYPE 2 DIABETES MELLITUS WITHOUT COMPLICATION, WITHOUT LONG-TERM CURRENT USE OF INSULIN (HCC): Primary | ICD-10-CM

## 2023-09-08 DIAGNOSIS — E78.2 MIXED HYPERLIPIDEMIA: ICD-10-CM

## 2023-09-08 DIAGNOSIS — I63.50 RIGHT PONTINE STROKE (HCC): Chronic | ICD-10-CM

## 2023-09-08 DIAGNOSIS — F10.21 ALCOHOL USE DISORDER, MODERATE, IN EARLY REMISSION (HCC): ICD-10-CM

## 2023-09-08 DIAGNOSIS — I10 PRIMARY HYPERTENSION: ICD-10-CM

## 2023-09-08 PROCEDURE — 99214 OFFICE O/P EST MOD 30 MIN: CPT | Performed by: FAMILY MEDICINE

## 2023-09-08 RX ORDER — ROSUVASTATIN CALCIUM 5 MG/1
5 TABLET, COATED ORAL DAILY
Qty: 90 TABLET | Refills: 3 | Status: SHIPPED | OUTPATIENT
Start: 2023-09-08

## 2023-09-08 RX ORDER — METFORMIN HYDROCHLORIDE 500 MG/1
1000 TABLET, EXTENDED RELEASE ORAL
Qty: 180 TABLET | Refills: 1 | Status: SHIPPED | OUTPATIENT
Start: 2023-09-08

## 2023-09-08 NOTE — PROGRESS NOTES
Name: Sanjiv Brown      : 1975      MRN: [id number]  Encounter Provider: Juan Stevens MD  Encounter Date: 2023   Encounter department: [address] [place] [address]     1. Type 2 diabetes mellitus without complication, without long-term current use of insulin [facility])  Assessment & Plan:    Lab Results   Component Value Date    HGBA1C 6.5 2023     Hemoglobin A1c has improved. Patient tolerates metformin 500 mg daily well. Fasting blood glucose remains elevated. Recommend to increase dose of metformin to 1000 mg daily. Repeat labs in 3 to 4 months. Orders:  -     Hemoglobin A1C; Future; Expected date: 12/15/2023  -     metFORMIN (GLUCOPHAGE-XR) 500 mg 24 hr tablet; Take 2 tablets (1,000 mg total) by mouth daily with dinner    2. Hyperkalemia  Comments:  Proceed with repeat BMP now. Patient is not on any potassium supplements. Orders:  -     Basic metabolic panel; Future    3. Mixed hyperlipidemia  -     rosuvastatin (CRESTOR) 5 mg tablet; Take 1 tablet (5 mg total) by mouth daily  -     Comprehensive metabolic panel; Future; Expected date: 12/15/2023  -     Lipid Panel with Direct LDL reflex; Future; Expected date: 12/15/2023    4. Right pontine stroke [facility])  Assessment & Plan:  No extremity weakness on exam.  Patient denies symptoms of headaches or dizziness. He has been sober. Start statin, continue aspirin. Monitor blood pressures at home with a regular BP cuff, update me in 10 to 14 days. 5. Primary hypertension  Assessment & Plan:  Patient reports normal BPs at home measured by Apple Watch. Blood pressure today is elevated. He is asymptomatic. Patient will start BP checks at home with a regular home BP cuff and will update me shortly. 6. Alcohol use disorder, moderate, in early remission [facility])  Assessment & Plan:  Patient reports sobriety      Return in about 4 months (around 2024). Subjective     Follow-up.   Patient has been feeling well. No extremity weakness or paresthesias. No headache or dizziness. No chest pain, palpitations or dyspnea. He has been sober. Results of recent blood work reviewed. Hemoglobin A1c has improved. Cholesterol remains elevated. Recent neurology note reviewed. Patient states that blood pressures measured by Apple Watch are normal, 125-137/83-85. Exercises regularly     Reviewed results of most recent blood work. Mildly elevated potassium at 5.3. Patient reports that he did have a bruise after blood draw. Hemoglobin A1c 6.9. Cholesterol 235 with LDL of 127. Diabetes      Review of Systems    Past Medical History:   Diagnosis Date   • Acute lymphangitis of left upper limb 11/14/2016   • Alcohol withdrawal seizure ([address]) 07/31/2021   • Diabetes mellitus ([address]) 06/02/2023   • Difficulty walking 06/02/2023   • Elevated LFTs 07/31/2021   • Hypertension    • Movement disorder 06/02/2023   • Stroke ([address]) 06/02/2023     History reviewed. No pertinent surgical history. History reviewed. No pertinent family history.   Social History     Socioeconomic History   • Marital status: /Civil Union     Spouse name: None   • Number of children: None   • Years of education: None   • Highest education level: None   Occupational History   • None   Tobacco Use   • Smoking status: Every Day     Packs/day: 0.50     Years: 20.00     Total pack years: 10.00     Types: Cigarettes     Start date: 1/1/2003   • Smokeless tobacco: Never   Vaping Use   • Vaping Use: Never used   Substance and Sexual Activity   • Alcohol use: Not Currently   • Drug use: No   • Sexual activity: Yes     Partners: Female     Birth control/protection: None   Other Topics Concern   • None   Social History Narrative   • None     Social Determinants of Health     Financial Resource Strain: Not on file   Food Insecurity: Not on file   Transportation Needs: Not on file   Physical Activity: Not on file   Stress: Not on file   Social Connections: Not on file   Intimate Partner Violence: Not on file   Housing Stability: Not on file     Current Outpatient Medications on File Prior to Visit   Medication Sig   • Alcohol Swabs 70 % PADS May substitute brand based on insurance coverage. Check glucose BID. • aspirin 81 mg chewable tablet Chew 1 tablet (81 mg total) daily Do not start before June 7, 2023. • Blood Glucose Monitoring Suppl (OneTouch Verio Reflect) w/Device KIT May substitute brand based on insurance coverage. Check glucose BID. • glucose blood (OneTouch Verio) test strip May substitute brand based on insurance coverage. Check glucose BID. No Known Allergies  Immunization History   Administered Date(s) Administered   • Influenza Quadrivalent Preservative Free 3 years and older IM 09/22/2016       Objective     /100 (BP Location: Left arm, Patient Position: Sitting, Cuff Size: Standard)   Pulse 87   Temp 99.3 °F (37.4 °C) (Temporal)   Resp 18   Ht 5' 5" (1.651 m)   Wt 82.2 kg (181 lb 3.2 oz)   SpO2 96%   BMI 30.15 kg/m²     Physical Exam  Vitals and nursing note reviewed. Constitutional:       General: He is not in acute distress. Appearance: Normal appearance. He is well-developed. He is not ill-appearing. HENT:      Head: Normocephalic and atraumatic. Eyes:      Conjunctiva/sclera: Conjunctivae normal.   Neck:      Vascular: No carotid bruit. Cardiovascular:      Rate and Rhythm: Normal rate and regular rhythm. Pulses: no weak pulses          Dorsalis pedis pulses are 2+ on the right side and 2+ on the left side. Heart sounds: Normal heart sounds. No murmur heard. Comments: 150/100  Pulmonary:      Effort: Pulmonary effort is normal. No respiratory distress. Breath sounds: Normal breath sounds. No wheezing or rales. Abdominal:      General: Bowel sounds are normal. There is no distension or abdominal bruit. Palpations: Abdomen is soft.    Musculoskeletal:         General: Normal range of motion. Cervical back: Neck supple. Feet:      Right foot:      Skin integrity: No ulcer, skin breakdown, erythema, warmth, callus or dry skin. Left foot:      Skin integrity: No ulcer, skin breakdown, erythema, warmth, callus or dry skin. Neurological:      Mental Status: He is alert and oriented to person, place, and time. Cranial Nerves: No cranial nerve deficit. Psychiatric:         Mood and Affect: Mood normal.         Behavior: Behavior normal.         Thought Content: Thought content normal.      Diabetic Foot Exam    Patient's shoes and socks removed. Right Foot/Ankle   Right Foot Inspection  Skin Exam: skin normal and skin intact. No dry skin, no warmth, no callus, no erythema, no maceration, no abnormal color, no pre-ulcer, no ulcer and no callus. Toe Exam: ROM and strength within normal limits. Sensory   Vibration: intact  Proprioception: intact  Monofilament testing: intact    Vascular  The right DP pulse is 2+. Left Foot/Ankle  Left Foot Inspection  Skin Exam: skin normal and skin intact. No dry skin, no warmth, no erythema, no maceration, normal color, no pre-ulcer, no ulcer and no callus. Toe Exam: ROM and strength within normal limits. Sensory   Vibration: intact  Proprioception: intact  Monofilament testing: intact    Vascular  The left DP pulse is 2+.      Assign Risk Category  No deformity present  No loss of protective sensation  No weak pulses  Risk: 0    Ghazal Kessler MD

## 2023-09-10 PROBLEM — E11.9 TYPE 2 DIABETES MELLITUS WITHOUT COMPLICATION, WITHOUT LONG-TERM CURRENT USE OF INSULIN (HCC): Chronic | Status: ACTIVE | Noted: 2021-07-31

## 2023-09-10 NOTE — ASSESSMENT & PLAN NOTE
Patient reports normal BPs at home measured by Apple Watch. Blood pressure today is elevated. He is asymptomatic. Patient will start BP checks at home with a regular home BP cuff and will update me shortly.

## 2023-09-10 NOTE — ASSESSMENT & PLAN NOTE
Lab Results   Component Value Date    HGBA1C 6.5 08/31/2023     Hemoglobin A1c has improved. Patient tolerates metformin 500 mg daily well. Fasting blood glucose remains elevated. Recommend to increase dose of metformin to 1000 mg daily. Repeat labs in 3 to 4 months.

## 2023-09-10 NOTE — ASSESSMENT & PLAN NOTE
No extremity weakness on exam.  Patient denies symptoms of headaches or dizziness. He has been sober. Start statin, continue aspirin. Monitor blood pressures at home with a regular BP cuff, update me in 10 to 14 days.

## 2023-10-17 ENCOUNTER — OFFICE VISIT (OUTPATIENT)
Dept: FAMILY MEDICINE CLINIC | Facility: CLINIC | Age: 48
End: 2023-10-17

## 2023-10-17 VITALS
HEART RATE: 86 BPM | BODY MASS INDEX: 30.49 KG/M2 | DIASTOLIC BLOOD PRESSURE: 80 MMHG | WEIGHT: 183 LBS | HEIGHT: 65 IN | RESPIRATION RATE: 16 BRPM | TEMPERATURE: 98.7 F | OXYGEN SATURATION: 97 % | SYSTOLIC BLOOD PRESSURE: 182 MMHG

## 2023-10-17 DIAGNOSIS — F41.1 ANXIETY AS ACUTE REACTION TO EXCEPTIONAL STRESS: ICD-10-CM

## 2023-10-17 DIAGNOSIS — I10 PRIMARY HYPERTENSION: Primary | ICD-10-CM

## 2023-10-17 DIAGNOSIS — F43.0 ANXIETY AS ACUTE REACTION TO EXCEPTIONAL STRESS: ICD-10-CM

## 2023-10-17 RX ORDER — AMLODIPINE BESYLATE 5 MG/1
5 TABLET ORAL DAILY
Qty: 30 TABLET | Refills: 1 | Status: SHIPPED | OUTPATIENT
Start: 2023-10-17

## 2023-10-17 RX ORDER — BUSPIRONE HYDROCHLORIDE 5 MG/1
5 TABLET ORAL 3 TIMES DAILY PRN
Qty: 30 TABLET | Refills: 1 | Status: SHIPPED | OUTPATIENT
Start: 2023-10-17

## 2023-10-17 NOTE — PROGRESS NOTES
Name: Carmela Leone      : 1975      MRN: [id number]  Encounter Provider: Ghazal Kessler MD  Encounter Date: 10/17/2023   Encounter department: [address] [place] [address]     1. Primary hypertension  -     amLODIPine (NORVASC) 5 mg tablet; Take 1 tablet (5 mg total) by mouth daily  -     busPIRone (BUSPAR) 5 mg tablet; Take 1 tablet (5 mg total) by mouth 3 (three) times a day as needed (anxiety)    2. Anxiety as acute reaction to exceptional stress    Presents with high blood pressure, he is completely asymptomatic, admits being under significant family related stress as well as financial stress lately. Pending work fitness examination which has been postponed due to high blood pressure. Start amlodipine 5 mg daily. Start BuSpar milligrams 3 times daily for anxiety. Follow-up 2 to 3 days for reassessment. Subjective     High BP. Increased stress lately, family related and job search/financial related. Patient just resuscitated his son-in-law due to overdose. Recent loss of close friend. Patient is here today accompanied by his sister. Patient had recent job interview and was evaluated by company's OT. He was not able to proceed with work physical since his blood pressure was elevated in the 190s. Patient was referred back to PCP to clear him to proceed with work fitness exam in spite of high blood pressure. Most recent BP in our office was 146/100 back in September. Patient has been monitoring BPs at home back then, reported on the high side of normal but not quite elevated as within past few days. Patient denies symptoms of chest pain, palpitations, shortness of breath. NO dizziness or headaches. Admits being anxious and under significant stress. Hypertension      Review of Systems   Constitutional: Negative. Respiratory: Negative. Cardiovascular: Negative. Genitourinary: Negative. Neurological: Negative. Psychiatric/Behavioral: Negative. Past Medical History:   Diagnosis Date   • Acute lymphangitis of left upper limb 11/14/2016   • Alcohol withdrawal seizure ([address]) 07/31/2021   • Diabetes mellitus ([address]) 06/02/2023   • Difficulty walking 06/02/2023   • Elevated LFTs 07/31/2021   • Hypertension    • Movement disorder 06/02/2023   • Stroke ([address]) 06/02/2023     History reviewed. No pertinent surgical history. History reviewed. No pertinent family history. Social History     Socioeconomic History   • Marital status: /Civil Union     Spouse name: None   • Number of children: None   • Years of education: None   • Highest education level: None   Occupational History   • None   Tobacco Use   • Smoking status: Every Day     Packs/day: 0.50     Years: 20.00     Total pack years: 10.00     Types: Cigarettes     Start date: 1/1/2003   • Smokeless tobacco: Never   Vaping Use   • Vaping Use: Never used   Substance and Sexual Activity   • Alcohol use: Not Currently   • Drug use: No   • Sexual activity: Yes     Partners: Female     Birth control/protection: None   Other Topics Concern   • None   Social History Narrative   • None     Social Determinants of Health     Financial Resource Strain: Not on file   Food Insecurity: Not on file   Transportation Needs: Not on file   Physical Activity: Not on file   Stress: Not on file   Social Connections: Not on file   Intimate Partner Violence: Not on file   Housing Stability: Not on file     Current Outpatient Medications on File Prior to Visit   Medication Sig   • Alcohol Swabs 70 % PADS May substitute brand based on insurance coverage. Check glucose BID. • aspirin 81 mg chewable tablet Chew 1 tablet (81 mg total) daily Do not start before June 7, 2023. • Blood Glucose Monitoring Suppl (OneTouch Verio Reflect) w/Device KIT May substitute brand based on insurance coverage. Check glucose BID.    • glucose blood (OneTouch Verio) test strip May substitute brand based on insurance coverage. Check glucose BID. • metFORMIN (GLUCOPHAGE-XR) 500 mg 24 hr tablet Take 2 tablets (1,000 mg total) by mouth daily with dinner   • rosuvastatin (CRESTOR) 5 mg tablet Take 1 tablet (5 mg total) by mouth daily     No Known Allergies  Immunization History   Administered Date(s) Administered   • Influenza Quadrivalent Preservative Free 3 years and older IM 09/22/2016       Objective     BP (!) 182/80   Pulse 86   Temp 98.7 °F (37.1 °C) (Temporal)   Resp 16   Ht 5' 5" (1.651 m)   Wt 83 kg (183 lb)   SpO2 97%   BMI 30.45 kg/m²   Vitals:    10/17/23 1006 10/17/23 1028 10/17/23 1029   BP: (!) 200/110 (!) 192/82 (!) 182/80   BP Location: Left arm Right arm    Patient Position: Sitting     Cuff Size: Standard     Pulse: 86     Resp: 16     Temp: 98.7 °F (37.1 °C)     TempSrc: Temporal     SpO2: 97%     Weight: 83 kg (183 lb)     Height: 5' 5" (1.651 m)         Physical Exam  Vitals and nursing note reviewed. Constitutional:       General: He is not in acute distress. Appearance: Normal appearance. He is well-developed. He is not ill-appearing. HENT:      Head: Normocephalic and atraumatic. Eyes:      Conjunctiva/sclera: Conjunctivae normal.   Neck:      Vascular: No carotid bruit. Cardiovascular:      Rate and Rhythm: Normal rate and regular rhythm. Heart sounds: Normal heart sounds. No murmur heard. Pulmonary:      Effort: Pulmonary effort is normal. No respiratory distress. Breath sounds: Normal breath sounds. No wheezing or rales. Abdominal:      General: There is no abdominal bruit. Musculoskeletal:         General: Normal range of motion. Cervical back: Neck supple. Neurological:      General: No focal deficit present. Mental Status: He is alert and oriented to person, place, and time. Cranial Nerves: No cranial nerve deficit.    Psychiatric:         Mood and Affect: Mood normal.         Behavior: Behavior normal.       Ciro Alexander MD

## 2023-10-18 PROBLEM — F43.0 ANXIETY AS ACUTE REACTION TO EXCEPTIONAL STRESS: Status: ACTIVE | Noted: 2023-10-18

## 2023-10-18 PROBLEM — F41.1 ANXIETY AS ACUTE REACTION TO EXCEPTIONAL STRESS: Status: ACTIVE | Noted: 2023-10-18

## 2023-10-19 ENCOUNTER — OFFICE VISIT (OUTPATIENT)
Dept: FAMILY MEDICINE CLINIC | Facility: CLINIC | Age: 48
End: 2023-10-19

## 2023-10-19 DIAGNOSIS — I10 PRIMARY HYPERTENSION: Primary | ICD-10-CM

## 2023-10-19 RX ORDER — ATENOLOL 25 MG/1
25 TABLET ORAL DAILY
Qty: 30 TABLET | Refills: 1 | Status: SHIPPED | OUTPATIENT
Start: 2023-10-19

## 2023-10-19 NOTE — PROGRESS NOTES
Name: Sanjiv Brown      : 1975      MRN: [id number]  Encounter Provider: Juan Stevens MD  Encounter Date: 10/19/2023   Encounter department: [address] [place] [address]. Primary hypertension  Assessment & Plan:  I suspect that patient's hypertension is significantly triggered by whitecoat syndrome. Blood pressures at home significantly lower than in the office. He is asymptomatic  Continue monitoring, patient will update me with BP and heart rate readings in a few days  Increase dose of amlodipine from 5 to 10 mg daily  Add atenolol 25 mg daily that should help with symptoms of anxiety as well as BP control  If BPs will be running low-we will reduce dose of amlodipine back to 5 mg daily      Orders:  -     atenolol (TENORMIN) 25 mg tablet; Take 1 tablet (25 mg total) by mouth daily      Patient Instructions   Dose of amlodipine should be increased from 1 tablet to 2 tablets daily (10 mg once a day)  Add another blood pressure medication that should also help anxiety and whitecoat symptoms, atenolol, 1 tablet once a day after lunch   continue on buspirone for the next few days and then you can stop and take it as needed for anxiety  Please update me with your blood pressure and heart rate readings in 4 to 5 days  If home blood pressures are below 120/80-please reduce dose of amlodipine back to 1 tablet daily    Patient will keep his regular scheduled appointment in January. Paperwork for preemployment physical clearance filled out. Subjective     Patient presents for follow-up of hypertension. He feels well. No symptoms. No adverse side effects of start of medications. Denies chest pain, headaches, dyspnea, palpitations, visual changes or dizziness. He started monitoring blood pressures at home automatic cuff and brought it here today for calibration    Daily Rx include amlodipine 5 mg daily and BuSpar 5 mg 3 times daily.   Patient denies symptoms of anxiety per se but admits that he has been stressed out. Home blood pressure yesterday was 160/90 daytime and down to 151/90 1 in the evening. Home BP cuff earlier this am 150/88 am, 137/83 upon arrival to the office in the car, immediately after arrival to the office patient's cuff measures 173/106 and now 174/98 and HR 90          Review of Systems    Past Medical History:   Diagnosis Date   • Acute lymphangitis of left upper limb 11/14/2016   • Alcohol withdrawal seizure ([address]) 07/31/2021   • Diabetes mellitus ([address]) 06/02/2023   • Difficulty walking 06/02/2023   • Elevated LFTs 07/31/2021   • Hypertension    • Movement disorder 06/02/2023   • Stroke ([facility]) 06/02/2023     History reviewed. No pertinent surgical history. History reviewed. No pertinent family history. Social History     Socioeconomic History   • Marital status: /Civil Union     Spouse name: None   • Number of children: None   • Years of education: None   • Highest education level: None   Occupational History   • None   Tobacco Use   • Smoking status: Every Day     Packs/day: 0.50     Years: 20.00     Total pack years: 10.00     Types: Cigarettes     Start date: 1/1/2003   • Smokeless tobacco: Never   Vaping Use   • Vaping Use: Never used   Substance and Sexual Activity   • Alcohol use: Not Currently   • Drug use: No   • Sexual activity: Yes     Partners: Female     Birth control/protection: None   Other Topics Concern   • None   Social History Narrative   • None     Social Determinants of Health     Financial Resource Strain: Not on file   Food Insecurity: Not on file   Transportation Needs: Not on file   Physical Activity: Not on file   Stress: Not on file   Social Connections: Not on file   Intimate Partner Violence: Not on file   Housing Stability: Not on file     Current Outpatient Medications on File Prior to Visit   Medication Sig   • Alcohol Swabs 70 % PADS May substitute brand based on insurance coverage. Check glucose BID.    • amLODIPine (NORVASC) 5 mg tablet Take 1 tablet (5 mg total) by mouth daily   • aspirin 81 mg chewable tablet Chew 1 tablet (81 mg total) daily Do not start before June 7, 2023. • Blood Glucose Monitoring Suppl (OneTouch Verio Reflect) w/Device KIT May substitute brand based on insurance coverage. Check glucose BID. • busPIRone (BUSPAR) 5 mg tablet Take 1 tablet (5 mg total) by mouth 3 (three) times a day as needed (anxiety)   • glucose blood (OneTouch Verio) test strip May substitute brand based on insurance coverage. Check glucose BID. • metFORMIN (GLUCOPHAGE-XR) 500 mg 24 hr tablet Take 2 tablets (1,000 mg total) by mouth daily with dinner   • rosuvastatin (CRESTOR) 5 mg tablet Take 1 tablet (5 mg total) by mouth daily     No Known Allergies  Immunization History   Administered Date(s) Administered   • Influenza Quadrivalent Preservative Free 3 years and older IM 09/22/2016       Objective     BP (!) 173/98   Pulse 90   Temp 98.4 °F (36.9 °C) (Temporal)   Resp 16   Ht 5' 5" (1.651 m)   Wt 81.2 kg (179 lb)   SpO2 97%   BMI 29.79 kg/m²   Vitals:    10/19/23 1147 10/19/23 1205   BP: (!) 190/100 (!) 173/98   BP Location: Left arm    Patient Position: Sitting    Cuff Size: Standard    Pulse: 100 90   Resp: 16    Temp: 98.4 °F (36.9 °C)    TempSrc: Temporal    SpO2: 97%    Weight: 81.2 kg (179 lb)    Height: 5' 5" (1.651 m)        Physical Exam  Constitutional:       Appearance: Normal appearance. Pulmonary:      Effort: Pulmonary effort is normal. No respiratory distress. Breath sounds: Normal breath sounds. No wheezing or rhonchi. Comments: Patient's blood pressure cuff calibrates accurately with in office reading, 180/80 on my check  Neurological:      General: No focal deficit present. Mental Status: He is alert and oriented to person, place, and time.    Psychiatric:         Mood and Affect: Mood normal.         Behavior: Behavior normal.       Pily Rodríguez MD

## 2023-10-19 NOTE — PATIENT INSTRUCTIONS
Dose of amlodipine should be increased from 1 tablet to 2 tablets daily (10 mg once a day)  Add another blood pressure medication that should also help anxiety and whitecoat symptoms, atenolol, 1 tablet once a day after lunch   continue on buspirone for the next few days and then you can stop and take it as needed for anxiety  Please update me with your blood pressure and heart rate readings in 4 to 5 days  If home blood pressures are below 120/80-please reduce dose of amlodipine back to 1 tablet daily

## 2023-10-21 VITALS
TEMPERATURE: 98.4 F | HEIGHT: 65 IN | BODY MASS INDEX: 29.82 KG/M2 | HEART RATE: 90 BPM | OXYGEN SATURATION: 97 % | DIASTOLIC BLOOD PRESSURE: 98 MMHG | RESPIRATION RATE: 16 BRPM | SYSTOLIC BLOOD PRESSURE: 173 MMHG | WEIGHT: 179 LBS

## 2023-10-21 NOTE — ASSESSMENT & PLAN NOTE
I suspect that patient's hypertension is significantly triggered by whitecoat syndrome. Blood pressures at home significantly lower than in the office.   He is asymptomatic  Continue monitoring, patient will update me with BP and heart rate readings in a few days  Increase dose of amlodipine from 5 to 10 mg daily  Add atenolol 25 mg daily that should help with symptoms of anxiety as well as BP control  If BPs will be running low-we will reduce dose of amlodipine back to 5 mg daily

## 2023-11-20 DIAGNOSIS — Z13.5 ENCOUNTER FOR SCREENING FOR DIABETIC RETINOPATHY: Primary | ICD-10-CM

## 2023-11-20 DIAGNOSIS — E11.9 TYPE 2 DIABETES MELLITUS WITHOUT COMPLICATION, WITHOUT LONG-TERM CURRENT USE OF INSULIN (HCC): ICD-10-CM

## 2023-11-28 ENCOUNTER — VBI (OUTPATIENT)
Dept: ADMINISTRATIVE | Facility: OTHER | Age: 48
End: 2023-11-28

## 2023-11-28 NOTE — TELEPHONE ENCOUNTER
Upon review of the In Basket request we were able to locate, review, and update the patient chart as requested for eGFR. Any additional questions or concerns should be emailed to the Practice Liaisons via the appropriate education email address, please do not reply via In Basket.     Thank you  Barbi Kong

## 2023-12-06 DIAGNOSIS — E78.2 MIXED HYPERLIPIDEMIA: ICD-10-CM

## 2023-12-07 RX ORDER — ROSUVASTATIN CALCIUM 5 MG/1
5 TABLET, COATED ORAL DAILY
Qty: 90 TABLET | Refills: 0 | Status: SHIPPED | OUTPATIENT
Start: 2023-12-07

## 2023-12-10 DIAGNOSIS — I10 PRIMARY HYPERTENSION: ICD-10-CM

## 2023-12-10 RX ORDER — AMLODIPINE BESYLATE 5 MG/1
5 TABLET ORAL DAILY
Qty: 30 TABLET | Refills: 1 | Status: SHIPPED | OUTPATIENT
Start: 2023-12-10

## 2023-12-10 RX ORDER — ATENOLOL 25 MG/1
25 TABLET ORAL DAILY
Qty: 30 TABLET | Refills: 1 | Status: SHIPPED | OUTPATIENT
Start: 2023-12-10

## 2024-01-03 LAB — HBA1C MFR BLD HPLC: 6.6 %

## 2024-01-09 ENCOUNTER — OFFICE VISIT (OUTPATIENT)
Dept: FAMILY MEDICINE CLINIC | Facility: CLINIC | Age: 49
End: 2024-01-09
Payer: COMMERCIAL

## 2024-01-09 VITALS
BODY MASS INDEX: 31.56 KG/M2 | RESPIRATION RATE: 16 BRPM | HEART RATE: 60 BPM | SYSTOLIC BLOOD PRESSURE: 142 MMHG | OXYGEN SATURATION: 98 % | TEMPERATURE: 98 F | WEIGHT: 189.4 LBS | DIASTOLIC BLOOD PRESSURE: 82 MMHG | HEIGHT: 65 IN

## 2024-01-09 DIAGNOSIS — M79.10 MYALGIA: ICD-10-CM

## 2024-01-09 DIAGNOSIS — Z12.11 SCREENING FOR COLON CANCER: ICD-10-CM

## 2024-01-09 DIAGNOSIS — F10.21 ALCOHOL USE DISORDER, MODERATE, IN EARLY REMISSION (HCC): ICD-10-CM

## 2024-01-09 DIAGNOSIS — E11.9 TYPE 2 DIABETES MELLITUS WITHOUT COMPLICATION, WITHOUT LONG-TERM CURRENT USE OF INSULIN (HCC): Chronic | ICD-10-CM

## 2024-01-09 DIAGNOSIS — Z12.5 PROSTATE CANCER SCREENING: ICD-10-CM

## 2024-01-09 DIAGNOSIS — E78.2 MIXED HYPERLIPIDEMIA: ICD-10-CM

## 2024-01-09 DIAGNOSIS — I10 PRIMARY HYPERTENSION: Primary | ICD-10-CM

## 2024-01-09 DIAGNOSIS — E83.52 HYPERCALCEMIA: ICD-10-CM

## 2024-01-09 DIAGNOSIS — Z86.73 HISTORY OF STROKE: ICD-10-CM

## 2024-01-09 PROCEDURE — 99214 OFFICE O/P EST MOD 30 MIN: CPT | Performed by: FAMILY MEDICINE

## 2024-01-09 RX ORDER — ATENOLOL 25 MG/1
25 TABLET ORAL DAILY
Qty: 90 TABLET | Refills: 1 | Status: SHIPPED | OUTPATIENT
Start: 2024-01-09

## 2024-01-09 RX ORDER — ROSUVASTATIN CALCIUM 5 MG/1
5 TABLET, COATED ORAL DAILY
Qty: 90 TABLET | Refills: 1 | Status: SHIPPED | OUTPATIENT
Start: 2024-01-09

## 2024-01-09 RX ORDER — METHOCARBAMOL 750 MG/1
750 TABLET, FILM COATED ORAL DAILY PRN
Qty: 90 TABLET | Refills: 1 | Status: SHIPPED | OUTPATIENT
Start: 2024-01-09

## 2024-01-09 RX ORDER — AMLODIPINE BESYLATE 5 MG/1
5 TABLET ORAL DAILY
Qty: 90 TABLET | Refills: 1 | Status: SHIPPED | OUTPATIENT
Start: 2024-01-09

## 2024-01-09 RX ORDER — METFORMIN HYDROCHLORIDE 500 MG/1
1000 TABLET, EXTENDED RELEASE ORAL
Qty: 180 TABLET | Refills: 1 | Status: SHIPPED | OUTPATIENT
Start: 2024-01-09

## 2024-01-09 NOTE — PATIENT INSTRUCTIONS
Blood pressure medications: Atenolol 1 tablet daily and amlodipine 1 tablet daily  Cholesterol medication: Rosuvastatin 1 tablet daily  Diabetes: Continue metformin

## 2024-01-09 NOTE — PROGRESS NOTES
Name: Elpidio Fulton      : 1975      MRN: [id number]  Encounter Provider: Rachell Casey MD  Encounter Date: 2024   Encounter department: [facility]    Assessment & Plan     1. Primary hypertension  Assessment & Plan:  Blood pressure is on the high side of normal.  Restart amlodipine 5 mg daily, continue atenolol    Orders:  -     amLODIPine (NORVASC) 5 mg tablet; Take 1 tablet (5 mg total) by mouth daily  -     atenolol (TENORMIN) 25 mg tablet; Take 1 tablet (25 mg total) by mouth daily    2. Hypercalcemia  Comments:  Mild elevation of calcium.  Discontinue Tums.  Monitor  Orders:  -     PTH, intact; Future; Expected date: 2024  -     Vitamin D 25 hydroxy; Future; Expected date: 2024  -     Calcium, ionized; Future; Expected date: 2024    3. Type 2 diabetes mellitus without complication, without long-term current use of insulin ([facility])  Assessment & Plan:    Lab Results   Component Value Date    HGBA1C 6.6 2024     Good glycemic control, continue metformin    Orders:  -     Comprehensive metabolic panel; Future; Expected date: 2024  -     CBC and differential; Future; Expected date: 2024  -     Hemoglobin A1C; Future; Expected date: 2024  -     Albumin / creatinine urine ratio; Future; Expected date: 2024  -     metFORMIN (GLUCOPHAGE-XR) 500 mg 24 hr tablet; Take 2 tablets (1,000 mg total) by mouth daily with dinner    4. Mixed hyperlipidemia  Assessment & Plan:  Continue rosuvastatin    Orders:  -     Lipid panel; Future; Expected date: 2024  -     TSH, 3rd generation; Future; Expected date: 2024  -     rosuvastatin (CRESTOR) 5 mg tablet; Take 1 tablet (5 mg total) by mouth daily    5. Prostate cancer screening  -     PSA, Total Screen; Future; Expected date: 2024    6. Myalgia  -     methocarbamol (Robaxin-750) 750 mg tablet; Take 1 tablet (750 mg total) by mouth daily as needed for muscle spasms    7.  Screening for colon cancer  -     Ty    8. Alcohol use disorder, moderate, in early remission (HCC)  Assessment & Plan:  Patient remains sober      9. History of stroke  Assessment & Plan:  No symptoms of headaches, weakness or paresthesias.  Continue BP meds, statin and aspirin.  Pending follow-up with neurology      Return in about 14 weeks (around 4/16/2024).    Patient Instructions   Blood pressure medications: Atenolol 1 tablet daily and amlodipine 1 tablet daily  Cholesterol medication: Rosuvastatin 1 tablet daily  Diabetes: Continue metformin         Subjective     Patient presents for follow-up of chronic medical conditions.  He is here today accompanied by his wife.  He has been feeling well and offers no complaints.  Results of recent blood work reviewed.  Hemoglobin A1c has normalized.  Patient remains on metformin as directed.  Calcium level is elevated 10.4.  He uses Tums on occasion.      Off Amlodipine   On atenolol and Rosuvastaiun      Pending OV with neurology  2/16/24    No complaints of chest pain, palpitations, shortness of breath, dizziness, extremity weakness or headaches.    He has been sober.      Review of Systems   Constitutional: Negative.    HENT: Negative.     Eyes: Negative.    Respiratory: Negative.     Cardiovascular: Negative.    Gastrointestinal: Negative.    Endocrine: Negative.    Genitourinary: Negative.    Musculoskeletal: Negative.    Allergic/Immunologic: Negative.    Neurological: Negative.    Hematological: Negative.    Psychiatric/Behavioral: Negative.         Past Medical History:   Diagnosis Date   • Acute lymphangitis of left upper limb 11/14/2016   • Alcohol withdrawal seizure (HCC) 07/31/2021   • Diabetes mellitus (HCC) 06/02/2023   • Difficulty walking 06/02/2023   • Elevated LFTs 07/31/2021   • Hypertension    • Movement disorder 06/02/2023   • Stroke ([facility]) 06/02/2023     History reviewed. No pertinent surgical history.  History reviewed. No pertinent family  "history.  Social History     Socioeconomic History   • Marital status: /Civil Union     Spouse name: None   • Number of children: None   • Years of education: None   • Highest education level: None   Occupational History   • None   Tobacco Use   • Smoking status: Every Day     Current packs/day: 0.50     Average packs/day: 0.5 packs/day for 21.0 years (10.5 ttl pk-yrs)     Types: Cigarettes     Start date: 1/1/2003   • Smokeless tobacco: Never   Vaping Use   • Vaping status: Never Used   Substance and Sexual Activity   • Alcohol use: Not Currently   • Drug use: No   • Sexual activity: Yes     Partners: Female     Birth control/protection: None   Other Topics Concern   • None   Social History Narrative   • None     Social Determinants of Health     Financial Resource Strain: Not on file   Food Insecurity: Not on file   Transportation Needs: Not on file   Physical Activity: Not on file   Stress: Not on file   Social Connections: Not on file   Intimate Partner Violence: Not on file   Housing Stability: Not on file     Current Outpatient Medications on File Prior to Visit   Medication Sig   • Alcohol Swabs 70 % PADS May substitute brand based on insurance coverage. Check glucose BID.   • aspirin 81 mg chewable tablet Chew 1 tablet (81 mg total) daily Do not start before June 7, 2023.   • Blood Glucose Monitoring Suppl (OneTouch Verio Reflect) w/Device KIT May substitute brand based on insurance coverage. Check glucose BID.   • glucose blood (OneTouch Verio) test strip May substitute brand based on insurance coverage. Check glucose BID.     No Known Allergies  Immunization History   Administered Date(s) Administered   • Influenza Quadrivalent Preservative Free 3 years and older IM 09/22/2016       Objective     /82   Pulse 60   Temp 98 °F (36.7 °C) (Temporal)   Resp 16   Ht 5' 5\" (1.651 m)   Wt 85.9 kg (189 lb 6.4 oz)   SpO2 98%   BMI 31.52 kg/m²     Physical Exam  Vitals and nursing note reviewed. "   Constitutional:       General: He is not in acute distress.     Appearance: Normal appearance. He is well-developed. He is not ill-appearing.   HENT:      Head: Normocephalic and atraumatic.   Eyes:      Conjunctiva/sclera: Conjunctivae normal.   Neck:      Vascular: No carotid bruit.   Cardiovascular:      Rate and Rhythm: Normal rate and regular rhythm.      Heart sounds: Normal heart sounds. No murmur heard.  Pulmonary:      Effort: Pulmonary effort is normal. No respiratory distress.      Breath sounds: Normal breath sounds. No wheezing or rales.   Abdominal:      General: Bowel sounds are normal. There is no distension or abdominal bruit.   Musculoskeletal:         General: Normal range of motion.      Cervical back: Neck supple.   Neurological:      General: No focal deficit present.      Mental Status: He is alert and oriented to person, place, and time.      Cranial Nerves: No cranial nerve deficit.   Psychiatric:         Mood and Affect: Mood normal.         Behavior: Behavior normal.       Rachell Casey MD

## 2024-01-13 PROBLEM — Z59.71 DOES NOT HAVE HEALTH INSURANCE: Chronic | Status: RESOLVED | Noted: 2023-06-13 | Resolved: 2024-01-13

## 2024-01-13 PROBLEM — R79.89 ELEVATED LFTS: Chronic | Status: RESOLVED | Noted: 2021-07-31 | Resolved: 2024-01-13

## 2024-01-13 PROBLEM — Z59.89 DOES NOT HAVE HEALTH INSURANCE: Chronic | Status: RESOLVED | Noted: 2023-06-13 | Resolved: 2024-01-13

## 2024-01-13 NOTE — ASSESSMENT & PLAN NOTE
No symptoms of headaches, weakness or paresthesias.  Continue BP meds, statin and aspirin.  Pending follow-up with neurology

## 2024-01-13 NOTE — ASSESSMENT & PLAN NOTE
Lab Results   Component Value Date    HGBA1C 6.6 01/03/2024     Good glycemic control, continue metformin

## 2024-02-07 LAB — COLOGUARD RESULT REPORTABLE: NEGATIVE

## 2024-04-12 ENCOUNTER — RA CDI HCC (OUTPATIENT)
Dept: OTHER | Facility: HOSPITAL | Age: 49
End: 2024-04-12

## 2024-06-21 DIAGNOSIS — M79.10 MYALGIA: ICD-10-CM

## 2024-06-24 RX ORDER — METHOCARBAMOL 750 MG/1
TABLET, FILM COATED ORAL
Qty: 90 TABLET | Refills: 0 | Status: SHIPPED | OUTPATIENT
Start: 2024-06-24

## 2024-07-06 DIAGNOSIS — I10 PRIMARY HYPERTENSION: ICD-10-CM

## 2024-07-06 RX ORDER — AMLODIPINE BESYLATE 5 MG/1
5 TABLET ORAL DAILY
Qty: 100 TABLET | Refills: 1 | Status: SHIPPED | OUTPATIENT
Start: 2024-07-06

## 2024-07-29 ENCOUNTER — OFFICE VISIT (OUTPATIENT)
Dept: FAMILY MEDICINE CLINIC | Facility: CLINIC | Age: 49
End: 2024-07-29

## 2024-07-29 VITALS
BODY MASS INDEX: 31.52 KG/M2 | HEART RATE: 63 BPM | TEMPERATURE: 98.4 F | WEIGHT: 189.4 LBS | RESPIRATION RATE: 18 BRPM | SYSTOLIC BLOOD PRESSURE: 122 MMHG | OXYGEN SATURATION: 98 % | DIASTOLIC BLOOD PRESSURE: 74 MMHG

## 2024-07-29 DIAGNOSIS — Z00.00 ENCOUNTER FOR WELLNESS EXAMINATION IN ADULT: Primary | ICD-10-CM

## 2024-07-29 DIAGNOSIS — F10.21 ALCOHOL USE DISORDER, MODERATE, IN EARLY REMISSION (HCC): ICD-10-CM

## 2024-07-29 DIAGNOSIS — E78.2 MIXED HYPERLIPIDEMIA: ICD-10-CM

## 2024-07-29 DIAGNOSIS — I10 PRIMARY HYPERTENSION: ICD-10-CM

## 2024-07-29 DIAGNOSIS — I63.50 RIGHT PONTINE STROKE (HCC): Chronic | ICD-10-CM

## 2024-07-29 DIAGNOSIS — E11.9 TYPE 2 DIABETES MELLITUS WITHOUT COMPLICATION, WITHOUT LONG-TERM CURRENT USE OF INSULIN (HCC): Chronic | ICD-10-CM

## 2024-07-29 PROCEDURE — 99396 PREV VISIT EST AGE 40-64: CPT | Performed by: FAMILY MEDICINE

## 2024-07-29 NOTE — PROGRESS NOTES
Ambulatory Visit  Name: Elpidio Fulton      : 1975      MRN: [id number]  Encounter Provider: Rachell Casey MD  Encounter Date: 2024   Encounter department: [facility]    Assessment & Plan   1. Encounter for wellness examination in adult  Comments:  Up-to-date with Cologuard.  Pending PSA.  Follows healthy diet.  Sober.  2. Primary hypertension  Assessment & Plan:  Blood pressure is well-controlled.  Continue amlodipine and atenolol  3. Right pontine stroke (HCC)  Assessment & Plan:  Continue aspirin 81 mg daily  Blood pressure is well-controlled.  Continue statin  4. Type 2 diabetes mellitus without complication, without long-term current use of insulin (HCC)  Assessment & Plan:    Lab Results   Component Value Date    HGBA1C 6.6 2024    Patient is on metformin 1000 mg daily  Proceed with blood work and urine microalbumin  5. Alcohol use disorder, moderate, in early remission (HCC)  Assessment & Plan:  Patient remains sober  6. Mixed hyperlipidemia  Assessment & Plan:  Rosuvastatin 5 mg daily.  Pending labs     Patient will proceed with blood work.  Follow-up 6 to 9 months and pending lab results.  He has been feeling well and offers no complaints today.  Sober, compliant with medications.     History of Present Illness     Patient presents for follow-up.He has been feeling well.  Blood pressures are well-controlled at home in the 1930 range for systolic.  No complaints of chest pain, palpitations, shortness of breath or dizziness.  He is here today accompanied by his wife.  He is past due for routine blood work.  Medications updated, he has been compliant.      Review of Systems   Constitutional: Negative.    HENT: Negative.     Eyes: Negative.    Respiratory: Negative.     Cardiovascular: Negative.    Gastrointestinal: Negative.    Endocrine: Negative.    Genitourinary: Negative.    Neurological: Negative.      Past Medical History:   Diagnosis Date   • Acute  lymphangitis of left upper limb 11/14/2016   • Alcohol withdrawal seizure (HCC) 07/31/2021   • Diabetes mellitus (HCC) 06/02/2023   • Difficulty walking 06/02/2023   • Elevated LFTs 07/31/2021   • Hypertension    • Movement disorder 06/02/2023   • Stroke (HCC) 06/02/2023     History reviewed. No pertinent surgical history.  History reviewed. No pertinent family history.  Social History     Tobacco Use   • Smoking status: Every Day     Current packs/day: 0.50     Average packs/day: 0.5 packs/day for 21.6 years (10.8 ttl pk-yrs)     Types: Cigarettes     Start date: 1/1/2003   • Smokeless tobacco: Never   Vaping Use   • Vaping status: Never Used   Substance and Sexual Activity   • Alcohol use: Not Currently   • Drug use: No   • Sexual activity: Yes     Partners: Female     Birth control/protection: None     Current Outpatient Medications on File Prior to Visit   Medication Sig   • Alcohol Swabs 70 % PADS May substitute brand based on insurance coverage. Check glucose BID.   • amLODIPine (NORVASC) 5 mg tablet TAKE ONE TABLET BY MOUTH EVERY DAY   • aspirin 81 mg chewable tablet Chew 1 tablet (81 mg total) daily Do not start before June 7, 2023.   • atenolol (TENORMIN) 25 mg tablet Take 1 tablet (25 mg total) by mouth daily   • Blood Glucose Monitoring Suppl (OneTouch Verio Reflect) w/Device KIT May substitute brand based on insurance coverage. Check glucose BID.   • glucose blood (OneTouch Verio) test strip May substitute brand based on insurance coverage. Check glucose BID.   • metFORMIN (GLUCOPHAGE-XR) 500 mg 24 hr tablet Take 2 tablets (1,000 mg total) by mouth daily with dinner   • methocarbamol (ROBAXIN) 750 mg tablet TAKE ONE TABLET BY MOUTH EVERY DAY AS NEEDED FOR MUSCLE SPASMS   • rosuvastatin (CRESTOR) 5 mg tablet Take 1 tablet (5 mg total) by mouth daily     No Known Allergies  Immunization History   Administered Date(s) Administered   • Influenza Quadrivalent Preservative Free 3 years and older IM 09/22/2016      Objective     /74   Pulse 63   Temp 98.4 °F (36.9 °C) (Temporal)   Resp 18   Wt 85.9 kg (189 lb 6.4 oz)   SpO2 98%   BMI 31.52 kg/m²     Physical Exam  Vitals and nursing note reviewed.   Constitutional:       General: He is not in acute distress.     Appearance: Normal appearance. He is well-developed. He is not ill-appearing.   HENT:      Head: Normocephalic and atraumatic.   Eyes:      Conjunctiva/sclera: Conjunctivae normal.   Neck:      Vascular: No carotid bruit.   Cardiovascular:      Rate and Rhythm: Normal rate and regular rhythm.      Heart sounds: Normal heart sounds. No murmur heard.  Pulmonary:      Effort: Pulmonary effort is normal. No respiratory distress.      Breath sounds: Normal breath sounds. No wheezing or rales.   Abdominal:      General: Bowel sounds are normal. There is no distension or abdominal bruit.      Palpations: Abdomen is soft.      Tenderness: There is no abdominal tenderness.   Musculoskeletal:         General: Normal range of motion.      Cervical back: Neck supple.      Right lower leg: No edema.      Left lower leg: No edema.   Neurological:      General: No focal deficit present.      Mental Status: He is alert and oriented to person, place, and time.      Cranial Nerves: No cranial nerve deficit.   Psychiatric:         Mood and Affect: Mood normal.         Behavior: Behavior normal.

## 2024-08-02 NOTE — ASSESSMENT & PLAN NOTE
Lab Results   Component Value Date    HGBA1C 6.6 01/03/2024    Patient is on metformin 1000 mg daily  Proceed with blood work and urine microalbumin

## 2024-08-03 DIAGNOSIS — I10 PRIMARY HYPERTENSION: ICD-10-CM

## 2024-08-04 RX ORDER — ATENOLOL 25 MG/1
25 TABLET ORAL DAILY
Qty: 90 TABLET | Refills: 1 | Status: SHIPPED | OUTPATIENT
Start: 2024-08-04

## 2024-08-12 LAB
ALBUMIN SERPL-MCNC: 4.6 G/DL (ref 3.5–5.7)
ALBUMIN/CREAT UR: NORMAL
ALP SERPL-CCNC: 125 U/L (ref 35–120)
ALT SERPL-CCNC: 40 U/L
ANION GAP SERPL CALCULATED.3IONS-SCNC: 13 MMOL/L (ref 3–11)
AST SERPL-CCNC: 34 U/L
BASOPHILS # BLD AUTO: 0.1 THOU/CMM (ref 0–0.1)
BASOPHILS NFR BLD AUTO: 1 %
BILIRUB SERPL-MCNC: 0.5 MG/DL (ref 0.2–1)
BUN SERPL-MCNC: 11 MG/DL (ref 7–28)
CALCIUM SERPL-MCNC: 10 MG/DL (ref 8.5–10.1)
CHLORIDE SERPL-SCNC: 106 MMOL/L (ref 100–109)
CHOLEST SERPL-MCNC: 166 MG/DL
CHOLEST/HDLC SERPL: 2.4 {RATIO}
CO2 SERPL-SCNC: 23 MMOL/L (ref 21–31)
CREAT SERPL-MCNC: 0.76 MG/DL (ref 0.53–1.3)
CREAT UR-MCNC: 52.7 MG/DL (ref 50–200)
CYTOLOGY CMNT CVX/VAG CYTO-IMP: ABNORMAL
DIFFERENTIAL METHOD BLD: NORMAL
EOSINOPHIL # BLD AUTO: 0.2 THOU/CMM (ref 0–0.5)
EOSINOPHIL NFR BLD AUTO: 2 %
ERYTHROCYTE [DISTWIDTH] IN BLOOD BY AUTOMATED COUNT: 13.7 % (ref 12–16)
EST. AVERAGE GLUCOSE BLD GHB EST-MCNC: 146 MG/DL
GFR/BSA.PRED SERPLBLD CYS-BASED-ARV: 109 ML/MIN/{1.73_M2}
GLUCOSE SERPL-MCNC: 172 MG/DL (ref 65–99)
HBA1C MFR BLD: 6.7 %
HCT VFR BLD AUTO: 43 % (ref 37–48)
HDLC SERPL-MCNC: 70 MG/DL (ref 23–92)
HGB BLD-MCNC: 14.9 G/DL (ref 12.5–17)
LDLC SERPL CALC-MCNC: 67 MG/DL
LYMPHOCYTES # BLD AUTO: 2.1 THOU/CMM (ref 1–3)
LYMPHOCYTES NFR BLD AUTO: 26 %
MCH RBC QN AUTO: 33.9 PG (ref 27–36)
MCHC RBC AUTO-ENTMCNC: 34.5 G/DL (ref 32–37)
MCV RBC AUTO: 98 FL (ref 80–100)
MICROALBUMIN UR-MCNC: <0.7 MG/DL
MONOCYTES # BLD AUTO: 0.6 THOU/CMM (ref 0.3–1)
MONOCYTES NFR BLD AUTO: 7 %
NEUTROPHILS # BLD AUTO: 5.1 THOU/CMM (ref 1.8–7.8)
NEUTROPHILS NFR BLD AUTO: 64 %
NONHDLC SERPL-MCNC: 96 MG/DL
PLATELET # BLD AUTO: 213 THOU/CMM (ref 140–350)
PMV BLD REES-ECKER: 8.7 FL (ref 7.5–11.3)
POTASSIUM SERPL-SCNC: 4.6 MMOL/L (ref 3.5–5.2)
PROT SERPL-MCNC: 7.2 G/DL (ref 6.3–8.3)
PSA SERPL-MCNC: 0.69 NG/ML
RBC # BLD AUTO: 4.38 MILL/CMM (ref 4–5.4)
SODIUM SERPL-SCNC: 142 MMOL/L (ref 135–145)
TRIGL SERPL-MCNC: 146 MG/DL
TSH SERPL-ACNC: 1.55 UIU/ML (ref 0.45–5.33)
WBC # BLD AUTO: 8 THOU/CMM (ref 4–10.5)

## 2024-08-15 ENCOUNTER — PATIENT MESSAGE (OUTPATIENT)
Dept: FAMILY MEDICINE CLINIC | Facility: CLINIC | Age: 49
End: 2024-08-15

## 2024-08-16 DIAGNOSIS — E78.2 MIXED HYPERLIPIDEMIA: ICD-10-CM

## 2024-08-16 DIAGNOSIS — E11.9 TYPE 2 DIABETES MELLITUS WITHOUT COMPLICATION, WITHOUT LONG-TERM CURRENT USE OF INSULIN (HCC): Primary | Chronic | ICD-10-CM

## 2024-08-17 DIAGNOSIS — E11.9 TYPE 2 DIABETES MELLITUS WITHOUT COMPLICATION, WITHOUT LONG-TERM CURRENT USE OF INSULIN (HCC): Chronic | ICD-10-CM

## 2024-08-17 RX ORDER — METFORMIN HCL 500 MG
TABLET, EXTENDED RELEASE 24 HR ORAL
Qty: 180 TABLET | Refills: 1 | Status: SHIPPED | OUTPATIENT
Start: 2024-08-17

## 2024-10-14 DIAGNOSIS — I10 PRIMARY HYPERTENSION: ICD-10-CM

## 2024-10-14 RX ORDER — AMLODIPINE BESYLATE 5 MG/1
5 TABLET ORAL DAILY
Qty: 100 TABLET | Refills: 0 | Status: SHIPPED | OUTPATIENT
Start: 2024-10-14

## 2024-10-16 ENCOUNTER — TELEPHONE (OUTPATIENT)
Age: 49
End: 2024-10-16

## 2024-10-16 NOTE — TELEPHONE ENCOUNTER
Pt wife called about a refill for amLODIPine (NORVASC) 5 mg tablet thinking it was denied but it was already sent to pharmacy. She will pick it up tomorrow

## 2025-01-10 DIAGNOSIS — I10 PRIMARY HYPERTENSION: ICD-10-CM

## 2025-01-10 RX ORDER — AMLODIPINE BESYLATE 5 MG/1
5 TABLET ORAL DAILY
Qty: 100 TABLET | Refills: 1 | Status: SHIPPED | OUTPATIENT
Start: 2025-01-10

## 2025-01-10 NOTE — TELEPHONE ENCOUNTER
Reason for call:   [x] Refill   [] Prior Auth  [] Other:     Office:   [x] PCP/Provider - Rachell Casey MD   [] Specialty/Provider -     Medication:     amLODIPine (NORVASC) 5 mg tablet       Dose/Frequency:     Take 1 tablet (5 mg total) by mouth daily       Quantity: 100    Pharmacy: [address] [phone]     Does the patient have enough for 3 days?   [] Yes   [x] No - Send as HP to POD

## 2025-01-11 DIAGNOSIS — I10 PRIMARY HYPERTENSION: ICD-10-CM

## 2025-01-12 RX ORDER — AMLODIPINE BESYLATE 5 MG/1
5 TABLET ORAL DAILY
Qty: 100 TABLET | Refills: 0 | OUTPATIENT
Start: 2025-01-12

## 2025-01-30 DIAGNOSIS — I10 PRIMARY HYPERTENSION: ICD-10-CM

## 2025-01-30 RX ORDER — ATENOLOL 25 MG/1
25 TABLET ORAL DAILY
Qty: 90 TABLET | Refills: 1 | Status: SHIPPED | OUTPATIENT
Start: 2025-01-30

## 2025-02-06 NOTE — TELEPHONE ENCOUNTER
Patient called requesting refill for atenolol. Patient made aware medication was refilled on 1/30 for 90 with 1 refills to [facility] pharmacy. Patient instructed to contact the pharmacy to obtain refills of medication. Patient verbalized understanding.

## 2025-02-15 DIAGNOSIS — E78.2 MIXED HYPERLIPIDEMIA: ICD-10-CM

## 2025-02-15 DIAGNOSIS — E11.9 TYPE 2 DIABETES MELLITUS WITHOUT COMPLICATION, WITHOUT LONG-TERM CURRENT USE OF INSULIN (HCC): Chronic | ICD-10-CM

## 2025-02-16 ENCOUNTER — APPOINTMENT (EMERGENCY)
Dept: CT IMAGING | Facility: HOSPITAL | Age: 50
DRG: 641 | End: 2025-02-16

## 2025-02-16 ENCOUNTER — HOSPITAL ENCOUNTER (INPATIENT)
Facility: HOSPITAL | Age: 50
LOS: 2 days | Discharge: HOME | DRG: 641 | End: 2025-02-18
Attending: STUDENT IN AN ORGANIZED HEALTH CARE EDUCATION/TRAINING PROGRAM | Admitting: INTERNAL MEDICINE

## 2025-02-16 ENCOUNTER — APPOINTMENT (EMERGENCY)
Dept: RADIOLOGY | Facility: HOSPITAL | Age: 50
DRG: 641 | End: 2025-02-16
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
## 2025-02-16 DIAGNOSIS — F10.939 ALCOHOL WITHDRAWAL SYNDROME WITH COMPLICATION (HCC): ICD-10-CM

## 2025-02-16 DIAGNOSIS — Z72.0 TOBACCO USE: ICD-10-CM

## 2025-02-16 DIAGNOSIS — F10.929 ALCOHOLIC INTOXICATION WITH COMPLICATION (HCC): Primary | ICD-10-CM

## 2025-02-16 DIAGNOSIS — F10.21 ALCOHOL USE DISORDER, MODERATE, IN EARLY REMISSION (HCC): ICD-10-CM

## 2025-02-16 PROBLEM — R93.89 ABNORMAL CAT SCAN: Status: ACTIVE | Noted: 2025-02-16

## 2025-02-16 PROBLEM — E87.29 HIGH ANION GAP METABOLIC ACIDOSIS: Status: ACTIVE | Noted: 2025-02-16

## 2025-02-16 LAB
ABO GROUP BLD: NORMAL
ABO GROUP BLD: NORMAL
ALBUMIN SERPL BCG-MCNC: 4.3 G/DL (ref 3.5–5)
ALP SERPL-CCNC: 134 U/L (ref 34–104)
ALT SERPL W P-5'-P-CCNC: 43 U/L (ref 7–52)
ANION GAP SERPL CALCULATED.3IONS-SCNC: 21 MMOL/L (ref 4–13)
ANION GAP SERPL CALCULATED.3IONS-SCNC: 22 MMOL/L (ref 4–13)
APAP SERPL-MCNC: <2 UG/ML (ref 10–20)
AST SERPL W P-5'-P-CCNC: 35 U/L (ref 13–39)
B-OH-BUTYR SERPL-MCNC: 1.29 MMOL/L (ref 0.02–0.27)
BASE EX.OXY STD BLDV CALC-SCNC: 95.9 % (ref 60–80)
BASE EXCESS BLDA CALC-SCNC: -7 MMOL/L (ref -2–3)
BASE EXCESS BLDV CALC-SCNC: -5.8 MMOL/L
BASOPHILS # BLD AUTO: 0.12 THOUSANDS/ΜL (ref 0–0.1)
BASOPHILS NFR BLD AUTO: 1 % (ref 0–1)
BILIRUB SERPL-MCNC: 0.45 MG/DL (ref 0.2–1)
BLD GP AB SCN SERPL QL: NEGATIVE
BUN SERPL-MCNC: 10 MG/DL (ref 5–25)
BUN SERPL-MCNC: 11 MG/DL (ref 5–25)
CA-I BLD-SCNC: 0.89 MMOL/L (ref 1.12–1.32)
CALCIUM SERPL-MCNC: 8.4 MG/DL (ref 8.4–10.2)
CALCIUM SERPL-MCNC: 8.4 MG/DL (ref 8.4–10.2)
CHLORIDE SERPL-SCNC: 102 MMOL/L (ref 96–108)
CHLORIDE SERPL-SCNC: 107 MMOL/L (ref 96–108)
CK SERPL-CCNC: 202 U/L (ref 39–308)
CO2 SERPL-SCNC: 16 MMOL/L (ref 21–32)
CO2 SERPL-SCNC: 17 MMOL/L (ref 21–32)
CREAT SERPL-MCNC: 0.67 MG/DL (ref 0.6–1.3)
CREAT SERPL-MCNC: 0.69 MG/DL (ref 0.6–1.3)
EOSINOPHIL # BLD AUTO: 0.07 THOUSAND/ΜL (ref 0–0.61)
EOSINOPHIL NFR BLD AUTO: 1 % (ref 0–6)
ERYTHROCYTE [DISTWIDTH] IN BLOOD BY AUTOMATED COUNT: 14.3 % (ref 11.6–15.1)
ETHANOL SERPL-MCNC: 369 MG/DL
GFR SERPL CREATININE-BSD FRML MDRD: 110 ML/MIN/1.73SQ M
GFR SERPL CREATININE-BSD FRML MDRD: 112 ML/MIN/1.73SQ M
GLUCOSE SERPL-MCNC: 109 MG/DL (ref 65–140)
GLUCOSE SERPL-MCNC: 116 MG/DL (ref 65–140)
GLUCOSE SERPL-MCNC: 129 MG/DL (ref 65–140)
GLUCOSE SERPL-MCNC: 164 MG/DL (ref 65–140)
HCO3 BLDA-SCNC: 15.8 MMOL/L (ref 24–30)
HCO3 BLDV-SCNC: 16.3 MMOL/L (ref 24–30)
HCT VFR BLD AUTO: 51.7 % (ref 36.5–49.3)
HCT VFR BLD CALC: 49 % (ref 36.5–49.3)
HGB BLD-MCNC: 17.3 G/DL (ref 12–17)
HGB BLDA-MCNC: 16.7 G/DL (ref 12–17)
IMM GRANULOCYTES # BLD AUTO: 0.07 THOUSAND/UL (ref 0–0.2)
IMM GRANULOCYTES NFR BLD AUTO: 1 % (ref 0–2)
LACTATE SERPL-SCNC: 6.2 MMOL/L (ref 0.5–2)
LACTATE SERPL-SCNC: 6.7 MMOL/L (ref 0.5–2)
LYMPHOCYTES # BLD AUTO: 4.84 THOUSANDS/ΜL (ref 0.6–4.47)
LYMPHOCYTES NFR BLD AUTO: 44 % (ref 14–44)
MAGNESIUM SERPL-MCNC: 1.9 MG/DL (ref 1.9–2.7)
MCH RBC QN AUTO: 33.1 PG (ref 26.8–34.3)
MCHC RBC AUTO-ENTMCNC: 33.5 G/DL (ref 31.4–37.4)
MCV RBC AUTO: 99 FL (ref 82–98)
MONOCYTES # BLD AUTO: 0.31 THOUSAND/ΜL (ref 0.17–1.22)
MONOCYTES NFR BLD AUTO: 3 % (ref 4–12)
NEUTROPHILS # BLD AUTO: 5.63 THOUSANDS/ΜL (ref 1.85–7.62)
NEUTS SEG NFR BLD AUTO: 50 % (ref 43–75)
NRBC BLD AUTO-RTO: 0 /100 WBCS
O2 CT BLDV-SCNC: 21.6 ML/DL
PCO2 BLD: 17 MMOL/L (ref 21–32)
PCO2 BLD: 26 MM HG (ref 42–50)
PCO2 BLDV: 24.9 MM HG (ref 42–50)
PH BLD: 7.39 [PH] (ref 7.3–7.4)
PH BLDV: 7.43 [PH] (ref 7.3–7.4)
PLATELET # BLD AUTO: 354 THOUSANDS/UL (ref 149–390)
PMV BLD AUTO: 9.1 FL (ref 8.9–12.7)
PO2 BLD: 59 MM HG (ref 35–45)
PO2 BLDV: 132.8 MM HG (ref 35–45)
POTASSIUM BLD-SCNC: 4.7 MMOL/L (ref 3.5–5.3)
POTASSIUM SERPL-SCNC: 4 MMOL/L (ref 3.5–5.3)
POTASSIUM SERPL-SCNC: 4.1 MMOL/L (ref 3.5–5.3)
PROT SERPL-MCNC: 6.6 G/DL (ref 6.4–8.4)
RBC # BLD AUTO: 5.22 MILLION/UL (ref 3.88–5.62)
RH BLD: POSITIVE
RH BLD: POSITIVE
SALICYLATES SERPL-MCNC: <5 MG/DL (ref 3–20)
SAO2 % BLD FROM PO2: 91 % (ref 60–85)
SODIUM BLD-SCNC: 142 MMOL/L (ref 136–145)
SODIUM SERPL-SCNC: 140 MMOL/L (ref 135–147)
SODIUM SERPL-SCNC: 145 MMOL/L (ref 135–147)
SPECIMEN EXPIRATION DATE: NORMAL
SPECIMEN SOURCE: ABNORMAL
WBC # BLD AUTO: 11.04 THOUSAND/UL (ref 4.31–10.16)

## 2025-02-16 PROCEDURE — 96366 THER/PROPH/DIAG IV INF ADDON: CPT

## 2025-02-16 PROCEDURE — 82805 BLOOD GASES W/O2 SATURATION: CPT

## 2025-02-16 PROCEDURE — 93308 TTE F-UP OR LMTD: CPT | Performed by: STUDENT IN AN ORGANIZED HEALTH CARE EDUCATION/TRAINING PROGRAM

## 2025-02-16 PROCEDURE — 80053 COMPREHEN METABOLIC PANEL: CPT | Performed by: STUDENT IN AN ORGANIZED HEALTH CARE EDUCATION/TRAINING PROGRAM

## 2025-02-16 PROCEDURE — 82803 BLOOD GASES ANY COMBINATION: CPT

## 2025-02-16 PROCEDURE — 83605 ASSAY OF LACTIC ACID: CPT

## 2025-02-16 PROCEDURE — 80048 BASIC METABOLIC PNL TOTAL CA: CPT

## 2025-02-16 PROCEDURE — 80143 DRUG ASSAY ACETAMINOPHEN: CPT | Performed by: STUDENT IN AN ORGANIZED HEALTH CARE EDUCATION/TRAINING PROGRAM

## 2025-02-16 PROCEDURE — 71045 X-RAY EXAM CHEST 1 VIEW: CPT

## 2025-02-16 PROCEDURE — EDAIR PR ED AIR: Performed by: EMERGENCY MEDICINE

## 2025-02-16 PROCEDURE — 71260 CT THORAX DX C+: CPT

## 2025-02-16 PROCEDURE — 99223 1ST HOSP IP/OBS HIGH 75: CPT

## 2025-02-16 PROCEDURE — 72125 CT NECK SPINE W/O DYE: CPT

## 2025-02-16 PROCEDURE — 82948 REAGENT STRIP/BLOOD GLUCOSE: CPT

## 2025-02-16 PROCEDURE — 85025 COMPLETE CBC W/AUTO DIFF WBC: CPT | Performed by: STUDENT IN AN ORGANIZED HEALTH CARE EDUCATION/TRAINING PROGRAM

## 2025-02-16 PROCEDURE — 74177 CT ABD & PELVIS W/CONTRAST: CPT

## 2025-02-16 PROCEDURE — 83036 HEMOGLOBIN GLYCOSYLATED A1C: CPT

## 2025-02-16 PROCEDURE — 86850 RBC ANTIBODY SCREEN: CPT | Performed by: STUDENT IN AN ORGANIZED HEALTH CARE EDUCATION/TRAINING PROGRAM

## 2025-02-16 PROCEDURE — 83605 ASSAY OF LACTIC ACID: CPT | Performed by: STUDENT IN AN ORGANIZED HEALTH CARE EDUCATION/TRAINING PROGRAM

## 2025-02-16 PROCEDURE — 96365 THER/PROPH/DIAG IV INF INIT: CPT

## 2025-02-16 PROCEDURE — 86900 BLOOD TYPING SEROLOGIC ABO: CPT | Performed by: STUDENT IN AN ORGANIZED HEALTH CARE EDUCATION/TRAINING PROGRAM

## 2025-02-16 PROCEDURE — 99285 EMERGENCY DEPT VISIT HI MDM: CPT

## 2025-02-16 PROCEDURE — 85014 HEMATOCRIT: CPT

## 2025-02-16 PROCEDURE — 90715 TDAP VACCINE 7 YRS/> IM: CPT | Performed by: STUDENT IN AN ORGANIZED HEALTH CARE EDUCATION/TRAINING PROGRAM

## 2025-02-16 PROCEDURE — 84295 ASSAY OF SERUM SODIUM: CPT

## 2025-02-16 PROCEDURE — 76705 ECHO EXAM OF ABDOMEN: CPT | Performed by: STUDENT IN AN ORGANIZED HEALTH CARE EDUCATION/TRAINING PROGRAM

## 2025-02-16 PROCEDURE — 80179 DRUG ASSAY SALICYLATE: CPT | Performed by: STUDENT IN AN ORGANIZED HEALTH CARE EDUCATION/TRAINING PROGRAM

## 2025-02-16 PROCEDURE — 70450 CT HEAD/BRAIN W/O DYE: CPT

## 2025-02-16 PROCEDURE — 82010 KETONE BODYS QUAN: CPT

## 2025-02-16 PROCEDURE — 82330 ASSAY OF CALCIUM: CPT

## 2025-02-16 PROCEDURE — 83735 ASSAY OF MAGNESIUM: CPT

## 2025-02-16 PROCEDURE — 82947 ASSAY GLUCOSE BLOOD QUANT: CPT

## 2025-02-16 PROCEDURE — 86901 BLOOD TYPING SEROLOGIC RH(D): CPT | Performed by: STUDENT IN AN ORGANIZED HEALTH CARE EDUCATION/TRAINING PROGRAM

## 2025-02-16 PROCEDURE — 82077 ASSAY SPEC XCP UR&BREATH IA: CPT | Performed by: STUDENT IN AN ORGANIZED HEALTH CARE EDUCATION/TRAINING PROGRAM

## 2025-02-16 PROCEDURE — 84132 ASSAY OF SERUM POTASSIUM: CPT

## 2025-02-16 PROCEDURE — 82550 ASSAY OF CK (CPK): CPT | Performed by: STUDENT IN AN ORGANIZED HEALTH CARE EDUCATION/TRAINING PROGRAM

## 2025-02-16 PROCEDURE — 99223 1ST HOSP IP/OBS HIGH 75: CPT | Performed by: STUDENT IN AN ORGANIZED HEALTH CARE EDUCATION/TRAINING PROGRAM

## 2025-02-16 PROCEDURE — 90471 IMMUNIZATION ADMIN: CPT

## 2025-02-16 PROCEDURE — 36415 COLL VENOUS BLD VENIPUNCTURE: CPT | Performed by: STUDENT IN AN ORGANIZED HEALTH CARE EDUCATION/TRAINING PROGRAM

## 2025-02-16 RX ORDER — LORAZEPAM 0.5 MG/1
0.5 TABLET ORAL ONCE
Status: COMPLETED | OUTPATIENT
Start: 2025-02-16 | End: 2025-02-16

## 2025-02-16 RX ORDER — SODIUM CHLORIDE, SODIUM GLUCONATE, SODIUM ACETATE, POTASSIUM CHLORIDE, MAGNESIUM CHLORIDE, SODIUM PHOSPHATE, DIBASIC, AND POTASSIUM PHOSPHATE .53; .5; .37; .037; .03; .012; .00082 G/100ML; G/100ML; G/100ML; G/100ML; G/100ML; G/100ML; G/100ML
1000 INJECTION, SOLUTION INTRAVENOUS ONCE
Status: COMPLETED | OUTPATIENT
Start: 2025-02-16 | End: 2025-02-16

## 2025-02-16 RX ORDER — ENOXAPARIN SODIUM 100 MG/ML
40 INJECTION SUBCUTANEOUS 2 TIMES DAILY
Status: DISCONTINUED | OUTPATIENT
Start: 2025-02-16 | End: 2025-02-18 | Stop reason: HOSPADM

## 2025-02-16 RX ORDER — PRAVASTATIN SODIUM 40 MG
40 TABLET ORAL
Status: DISCONTINUED | OUTPATIENT
Start: 2025-02-17 | End: 2025-02-18 | Stop reason: HOSPADM

## 2025-02-16 RX ORDER — ATENOLOL 25 MG/1
25 TABLET ORAL DAILY
Status: DISCONTINUED | OUTPATIENT
Start: 2025-02-17 | End: 2025-02-18 | Stop reason: HOSPADM

## 2025-02-16 RX ORDER — SODIUM CHLORIDE, SODIUM GLUCONATE, SODIUM ACETATE, POTASSIUM CHLORIDE, MAGNESIUM CHLORIDE, SODIUM PHOSPHATE, DIBASIC, AND POTASSIUM PHOSPHATE .53; .5; .37; .037; .03; .012; .00082 G/100ML; G/100ML; G/100ML; G/100ML; G/100ML; G/100ML; G/100ML
100 INJECTION, SOLUTION INTRAVENOUS CONTINUOUS
Status: DISCONTINUED | OUTPATIENT
Start: 2025-02-16 | End: 2025-02-17

## 2025-02-16 RX ORDER — LANOLIN ALCOHOL/MO/W.PET/CERES
100 CREAM (GRAM) TOPICAL DAILY
Status: DISCONTINUED | OUTPATIENT
Start: 2025-02-16 | End: 2025-02-18 | Stop reason: HOSPADM

## 2025-02-16 RX ORDER — ASPIRIN 81 MG/1
81 TABLET, CHEWABLE ORAL DAILY
Status: DISCONTINUED | OUTPATIENT
Start: 2025-02-17 | End: 2025-02-18 | Stop reason: HOSPADM

## 2025-02-16 RX ORDER — FOLIC ACID 1 MG/1
1 TABLET ORAL DAILY
Status: DISCONTINUED | OUTPATIENT
Start: 2025-02-16 | End: 2025-02-18 | Stop reason: HOSPADM

## 2025-02-16 RX ORDER — INSULIN LISPRO 100 [IU]/ML
1-6 INJECTION, SOLUTION INTRAVENOUS; SUBCUTANEOUS
Status: DISCONTINUED | OUTPATIENT
Start: 2025-02-16 | End: 2025-02-18 | Stop reason: HOSPADM

## 2025-02-16 RX ORDER — SODIUM CHLORIDE, SODIUM GLUCONATE, SODIUM ACETATE, POTASSIUM CHLORIDE, MAGNESIUM CHLORIDE, SODIUM PHOSPHATE, DIBASIC, AND POTASSIUM PHOSPHATE .53; .5; .37; .037; .03; .012; .00082 G/100ML; G/100ML; G/100ML; G/100ML; G/100ML; G/100ML; G/100ML
INJECTION, SOLUTION INTRAVENOUS
Status: COMPLETED | OUTPATIENT
Start: 2025-02-16 | End: 2025-02-16

## 2025-02-16 RX ORDER — SODIUM CHLORIDE, SODIUM GLUCONATE, SODIUM ACETATE, POTASSIUM CHLORIDE, MAGNESIUM CHLORIDE, SODIUM PHOSPHATE, DIBASIC, AND POTASSIUM PHOSPHATE .53; .5; .37; .037; .03; .012; .00082 G/100ML; G/100ML; G/100ML; G/100ML; G/100ML; G/100ML; G/100ML
1000 INJECTION, SOLUTION INTRAVENOUS ONCE
Status: COMPLETED | OUTPATIENT
Start: 2025-02-16 | End: 2025-02-17

## 2025-02-16 RX ORDER — AMLODIPINE BESYLATE 5 MG/1
5 TABLET ORAL DAILY
Status: DISCONTINUED | OUTPATIENT
Start: 2025-02-17 | End: 2025-02-18 | Stop reason: HOSPADM

## 2025-02-16 RX ADMIN — LORAZEPAM 0.5 MG: 0.5 TABLET ORAL at 22:55

## 2025-02-16 RX ADMIN — ENOXAPARIN SODIUM 40 MG: 40 INJECTION SUBCUTANEOUS at 22:55

## 2025-02-16 RX ADMIN — SODIUM CHLORIDE, SODIUM GLUCONATE, SODIUM ACETATE, POTASSIUM CHLORIDE, MAGNESIUM CHLORIDE, SODIUM PHOSPHATE, DIBASIC, AND POTASSIUM PHOSPHATE 100 ML/HR: .53; .5; .37; .037; .03; .012; .00082 INJECTION, SOLUTION INTRAVENOUS at 22:53

## 2025-02-16 RX ADMIN — TETANUS TOXOID, REDUCED DIPHTHERIA TOXOID AND ACELLULAR PERTUSSIS VACCINE, ADSORBED 0.5 ML: 5; 2.5; 8; 8; 2.5 SUSPENSION INTRAMUSCULAR at 19:27

## 2025-02-16 RX ADMIN — SODIUM CHLORIDE, SODIUM GLUCONATE, SODIUM ACETATE, POTASSIUM CHLORIDE, MAGNESIUM CHLORIDE, SODIUM PHOSPHATE, DIBASIC, AND POTASSIUM PHOSPHATE 1000 ML: .53; .5; .37; .037; .03; .012; .00082 INJECTION, SOLUTION INTRAVENOUS at 18:43

## 2025-02-16 RX ADMIN — Medication 100 MG: at 22:55

## 2025-02-16 RX ADMIN — FOLIC ACID 1 MG: 1 TABLET ORAL at 22:55

## 2025-02-16 RX ADMIN — SODIUM CHLORIDE, SODIUM GLUCONATE, SODIUM ACETATE, POTASSIUM CHLORIDE, MAGNESIUM CHLORIDE, SODIUM PHOSPHATE, DIBASIC, AND POTASSIUM PHOSPHATE 1000 ML: .53; .5; .37; .037; .03; .012; .00082 INJECTION, SOLUTION INTRAVENOUS at 20:15

## 2025-02-16 RX ADMIN — IOHEXOL 100 ML: 350 INJECTION, SOLUTION INTRAVENOUS at 19:00

## 2025-02-16 RX ADMIN — MULTIPLE VITAMINS W/ MINERALS TAB 1 TABLET: TAB ORAL at 22:55

## 2025-02-16 NOTE — PROCEDURES
POC FAST US    Date/Time: 2/16/2025 6:55 PM    Performed by: Amie Melendez MD  Authorized by: Amie Melendez MD    Patient location:  Trauma  Other Assisting Provider: No    Procedure details:     Exam Type:  Diagnostic    Indications: blunt abdominal trauma      Assess for:  Intra-abdominal fluid and pericardial effusion    Technique: FAST      Views obtained:  Heart - Pericardial sac, LUQ - Splenorenal space, RUQ - Chavira's Pouch and Suprapubic - Pouch of Arpit    Image quality: diagnostic      Image availability:  Images available in PACS  FAST Findings:     RUQ (Hepatorenal) free fluid: absent      LUQ (Splenorenal) free fluid: absent      Suprapubic free fluid: absent      Cardiac wall motion: identified      Pericardial effusion: indeterminate    Interpretation:     Impressions: indeterminate

## 2025-02-16 NOTE — ED PROVIDER NOTES
Emergency Department Airway Evaluation and Management Form    History  Obtained from: EMS, Staff  Patient has no known allergies.  No chief complaint on file.    HPI    Patient is a 49-year-old male, made a trauma level B patient after initial presentation to ED, for altered mental status/intoxication/fall with head strike.  Remainder of HPI per trauma    Past Medical History:   Diagnosis Date    Acute lymphangitis of left upper limb 11/14/2016    Alcohol withdrawal seizure (HCC) 07/31/2021    Diabetes mellitus (HCC) 06/02/2023    Difficulty walking 06/02/2023    Elevated LFTs 07/31/2021    Hypertension     Movement disorder 06/02/2023    Stroke (HCC) 06/02/2023     No past surgical history on file.  No family history on file.  Social History     Tobacco Use    Smoking status: Every Day     Current packs/day: 0.50     Average packs/day: 0.5 packs/day for 22.1 years (11.1 ttl pk-yrs)     Types: Cigarettes     Start date: 1/1/2003    Smokeless tobacco: Never   Vaping Use    Vaping status: Never Used   Substance Use Topics    Alcohol use: Not Currently    Drug use: No     I have reviewed and agree with the history as documented.    Review of Systems    ROS per trauma note    Physical Exam  BP (!) 162/104   Pulse (!) 126   Resp 20   SpO2 97%     Physical Exam    Airway intact.  Remainder physical exam per trauma note    ED Medications  Medications   tetanus-diphtheria-acellular pertussis (BOOSTRIX) IM injection 0.5 mL (has no administration in time range)       Intubation  Procedures    Notes  Airway intact    Final Diagnosis  Final diagnoses:   None       ED Provider  Electronically Signed by     Oneal Guevara MD  02/16/25 2603

## 2025-02-16 NOTE — H&P
H&P - Trauma   Name: Elpidio Fulton 49 y.o. male I MRN: [id number]  Unit/Bed#: ED-06 I Date of Admission: 2/16/2025   Date of Service: 2/16/2025 I Hospital Day: 0     Assessment & Plan  Alcoholic intoxication with complication (HCC)  Presents to the ED for evaluation of acute alcohol intoxication  Upgraded to trauma B due to AMS  Hypertensive SBP 180s and tachycardic to 130s on arrival, normal work of breathing, satting 98% on RA  GCS 14 for confusion  No external signs of acute trauma  Glucose 66 per EMS    -CXR negative for acute findings  -FAST indeterminate; unable to obtain adequate cardiac views   -CTH/C-spine negative for acute traumatic injuries  -CT chest/abd/pelvis with incidental findings but negative for acute traumatic injuries  -Lactic 6.7  -AG 21, bicarb 17, Glucose 129  -VBG, BHB, mag pending  -Given 2L IVF  -CIWA  -folate, thiamine  -Will admit to [facility] for continued treatment and detox    Trauma Alert: Level B   Model of Arrival: Ambulance    Trauma Team: Attending Ullrich and Residents KENNY Melendez  Consultants:     None     History of Present Illness   Chief Complaint: acute intoxication  Mechanism:Other: no known injury     Elpidio Fulton is a 49 y.o. male who initially presented to the ED for evaluation of acute alcohol intoxication but was upgraded to a trauma B due to AMS and unknown associated injury. Patient GCS 14 for confusion in the bay. He is awake and alert, appears intoxicated. Says he drank 'a lot' today but doesn't quantify how much. Says he drinks often. Denies drug use. Denies any falls or injuries. Denies any blood thinners. He has DMT2. Patient's wife later gives contributing hx stating that patient goes on binges, he has been drinking for the last 5 days. She thinks he has drank around a gallon of vodka over the last day. She says he has a long history of alcohol abuse and related stroke and seizure. Patient states he is interested in detox.     Review of Systems   Unable to  perform ROS: Mental status change     Historical Information   Past Medical History:   Diagnosis Date    Acute lymphangitis of left upper limb 11/14/2016    Alcohol withdrawal seizure (HCC) 07/31/2021    Diabetes mellitus (HCC) 06/02/2023    Difficulty walking 06/02/2023    Elevated LFTs 07/31/2021    Hypertension     Movement disorder 06/02/2023    Stroke (HCC) 06/02/2023     No past surgical history on file.  Social History     Tobacco Use    Smoking status: Every Day     Current packs/day: 0.50     Average packs/day: 0.5 packs/day for 22.1 years (11.1 ttl pk-yrs)     Types: Cigarettes     Start date: 1/1/2003    Smokeless tobacco: Never   Vaping Use    Vaping status: Never Used   Substance and Sexual Activity    Alcohol use: Not Currently    Drug use: No    Sexual activity: Yes     Partners: Female     Birth control/protection: None     E-Cigarette/Vaping    E-Cigarette Use Never User      E-Cigarette/Vaping Substances    Nicotine No     THC No     CBD No     Flavoring No     Other No     Unknown No      Family history non-contributory  Immunization History   Administered Date(s) Administered    Influenza Quadrivalent Preservative Free 3 years and older IM 09/22/2016    Tdap 02/16/2025     Last Tetanus: updated today       Objective :  Temp:  [98.2 °F (36.8 °C)] 98.2 °F (36.8 °C)  HR:  [109-126] 119  BP: (161-186)/() 186/94  Resp:  [20] 20  SpO2:  [95 %-98 %] 98 %  O2 Device: None (Room air)    Initial Vitals:   Temperature: 98.2 °F (36.8 °C) (02/16/25 1843)  Pulse: (!) 126 (02/16/25 1835)  Respirations: 20 (02/16/25 1835)  Blood Pressure: (!) 162/104 (02/16/25 1835)    Primary Survey:   Airway:        Status: patent;        Pre-hospital Interventions: none        Hospital Interventions: none  Breathing:        Pre-hospital Interventions: none       Effort: normal       Right breath sounds: normal       Left breath sounds: normal  Circulation:        Rhythm: regular       Rate: regular   Right Pulses Left  Pulses    R radial: 2+  R femoral: 2+  R pedal: 2+     L radial: 2+  L femoral: 2+  L pedal: 2+       Disability:        GCS: Eye: 4; Verbal: 4 Motor: 6 Total: 14       Right Pupil: round;  reactive         Left Pupil:  round;  reactive      R Motor Strength L Motor Strength    R : 5/5  R dorsiflex: 5/5  R plantarflex: 5/5 L : 5/5  L dorsiflex: 5/5  L plantarflex: 5/5        Sensory:  No sensory deficit  Exposure:       Completed: Yes      Secondary Survey:  Physical Exam  Constitutional:       Appearance: Normal appearance. He is ill-appearing.   HENT:      Head: Normocephalic and atraumatic.   Eyes:      Extraocular Movements: Extraocular movements intact.      Conjunctiva/sclera: Conjunctivae normal.      Pupils: Pupils are equal, round, and reactive to light.   Neck:      Comments: C-collar in place  No C/T/L spine tenderness with palpation.   Cardiovascular:      Rate and Rhythm: Regular rhythm. Tachycardia present.      Pulses: Normal pulses.      Heart sounds: Normal heart sounds.   Pulmonary:      Effort: Pulmonary effort is normal. No respiratory distress.      Breath sounds: Normal breath sounds. No stridor. No wheezing or rales.   Abdominal:      General: There is no distension.      Palpations: Abdomen is soft.      Tenderness: There is no abdominal tenderness. There is no guarding or rebound.   Musculoskeletal:      Cervical back: Normal range of motion and neck supple. No rigidity or tenderness.      Right lower leg: No edema.      Left lower leg: No edema.   Skin:     General: Skin is warm and dry.   Neurological:      Mental Status: He is alert. He is disoriented.      GCS: GCS eye subscore is 4. GCS verbal subscore is 4. GCS motor subscore is 6.      Motor: Motor function is intact.      Comments: Oriented to self and place. Disoriented to time. Awake though appears intoxicated. Moving all 4 extremities spontaneously.              Lab Results: I have reviewed the following results:  Recent  Labs     02/16/25  1844 02/16/25  1846 02/16/25  1938 02/16/25 2015   WBC 11.04*  --   --   --    HGB 17.3* 16.7  --   --    HCT 51.7* 49  --   --      --   --   --    SODIUM  --   --   --  145   K  --   --   --  4.1   CL  --   --   --  107   CO2  --  17*  --  17*   BUN  --   --   --  11   CREATININE  --   --   --  0.67   GLUC  --   --   --  129   CAIONIZED  --  0.89*  --   --    MG  --   --   --  1.9   AST  --   --   --  35   ALT  --   --   --  43   ALB  --   --   --  4.3   TBILI  --   --   --  0.45   ALKPHOS  --   --   --  134*   LACTICACID  --   --  6.7*  --        Imaging Results: I have personally reviewed pertinent images saved in PACS. CT scan findings (and other pertinent positive findings on images) were discussed with radiology. My interpretation of the images/reports are as follows:  Chest Xray(s): negative for acute findings   FAST exam(s): indeterminate   CT Scan(s): negative for acute traumatic findings   Additional Xray(s): N/A     Other Studies: Other Study Results Review: No additional pertinent studies reviewed.

## 2025-02-17 PROBLEM — E87.29 HIGH ANION GAP METABOLIC ACIDOSIS: Status: RESOLVED | Noted: 2025-02-16 | Resolved: 2025-02-17

## 2025-02-17 LAB
ALBUMIN SERPL BCG-MCNC: 3.9 G/DL (ref 3.5–5)
ALP SERPL-CCNC: 106 U/L (ref 34–104)
ALT SERPL W P-5'-P-CCNC: 35 U/L (ref 7–52)
ANION GAP SERPL CALCULATED.3IONS-SCNC: 13 MMOL/L (ref 4–13)
AST SERPL W P-5'-P-CCNC: 28 U/L (ref 13–39)
BASE EX.OXY STD BLDV CALC-SCNC: 95.2 % (ref 60–80)
BASE EX.OXY STD BLDV CALC-SCNC: 96.9 % (ref 60–80)
BASE EXCESS BLDV CALC-SCNC: -0.1 MMOL/L
BASE EXCESS BLDV CALC-SCNC: 1.1 MMOL/L
BASOPHILS # BLD MANUAL: 0.1 THOUSAND/UL (ref 0–0.1)
BASOPHILS NFR MAR MANUAL: 1 % (ref 0–1)
BILIRUB SERPL-MCNC: 0.53 MG/DL (ref 0.2–1)
BUN SERPL-MCNC: 10 MG/DL (ref 5–25)
CALCIUM SERPL-MCNC: 8.3 MG/DL (ref 8.4–10.2)
CHLORIDE SERPL-SCNC: 99 MMOL/L (ref 96–108)
CO2 SERPL-SCNC: 24 MMOL/L (ref 21–32)
CREAT SERPL-MCNC: 0.72 MG/DL (ref 0.6–1.3)
EOSINOPHIL # BLD MANUAL: 0.1 THOUSAND/UL (ref 0–0.4)
EOSINOPHIL NFR BLD MANUAL: 1 % (ref 0–6)
ERYTHROCYTE [DISTWIDTH] IN BLOOD BY AUTOMATED COUNT: 13.9 % (ref 11.6–15.1)
EST. AVERAGE GLUCOSE BLD GHB EST-MCNC: 137 MG/DL
GFR SERPL CREATININE-BSD FRML MDRD: 108 ML/MIN/1.73SQ M
GLUCOSE SERPL-MCNC: 138 MG/DL (ref 65–140)
GLUCOSE SERPL-MCNC: 165 MG/DL (ref 65–140)
GLUCOSE SERPL-MCNC: 169 MG/DL (ref 65–140)
GLUCOSE SERPL-MCNC: 199 MG/DL (ref 65–140)
GLUCOSE SERPL-MCNC: 233 MG/DL (ref 65–140)
HBA1C MFR BLD: 6.4 %
HCO3 BLDV-SCNC: 21.6 MMOL/L (ref 24–30)
HCO3 BLDV-SCNC: 23 MMOL/L (ref 24–30)
HCT VFR BLD AUTO: 37.8 % (ref 36.5–49.3)
HGB BLD-MCNC: 13.5 G/DL (ref 12–17)
LACTATE SERPL-SCNC: 1.1 MMOL/L (ref 0.5–2)
LACTATE SERPL-SCNC: 2.2 MMOL/L (ref 0.5–2)
LACTATE SERPL-SCNC: 4.6 MMOL/L (ref 0.5–2)
LYMPHOCYTES # BLD AUTO: 2.72 THOUSAND/UL (ref 0.6–4.47)
LYMPHOCYTES # BLD AUTO: 26 % (ref 14–44)
MAGNESIUM SERPL-MCNC: 1.8 MG/DL (ref 1.9–2.7)
MCH RBC QN AUTO: 33.8 PG (ref 26.8–34.3)
MCHC RBC AUTO-ENTMCNC: 35.4 G/DL (ref 31.4–37.4)
MCV RBC AUTO: 96 FL (ref 82–98)
MONOCYTES # BLD AUTO: 0.21 THOUSAND/UL (ref 0–1.22)
MONOCYTES NFR BLD: 2 % (ref 4–12)
NEUTROPHILS # BLD MANUAL: 7.33 THOUSAND/UL (ref 1.85–7.62)
NEUTS BAND NFR BLD MANUAL: 1 % (ref 0–8)
NEUTS SEG NFR BLD AUTO: 69 % (ref 43–75)
O2 CT BLDV-SCNC: 18.7 ML/DL
O2 CT BLDV-SCNC: 19.2 ML/DL
PCO2 BLDV: 27.5 MM HG (ref 42–50)
PCO2 BLDV: 29.1 MM HG (ref 42–50)
PH BLDV: 7.51 [PH] (ref 7.3–7.4)
PH BLDV: 7.51 [PH] (ref 7.3–7.4)
PLATELET # BLD AUTO: 273 THOUSANDS/UL (ref 149–390)
PLATELET BLD QL SMEAR: ADEQUATE
PMV BLD AUTO: 9.1 FL (ref 8.9–12.7)
PO2 BLDV: 103.8 MM HG (ref 35–45)
PO2 BLDV: 119.4 MM HG (ref 35–45)
POTASSIUM SERPL-SCNC: 4.1 MMOL/L (ref 3.5–5.3)
PROT SERPL-MCNC: 6.2 G/DL (ref 6.4–8.4)
RBC # BLD AUTO: 3.96 MILLION/UL (ref 3.88–5.62)
RBC MORPH BLD: NORMAL
SMUDGE CELLS BLD QL SMEAR: PRESENT
SODIUM SERPL-SCNC: 136 MMOL/L (ref 135–147)
WBC # BLD AUTO: 10.47 THOUSAND/UL (ref 4.31–10.16)

## 2025-02-17 PROCEDURE — 82805 BLOOD GASES W/O2 SATURATION: CPT

## 2025-02-17 PROCEDURE — 85027 COMPLETE CBC AUTOMATED: CPT

## 2025-02-17 PROCEDURE — 83605 ASSAY OF LACTIC ACID: CPT | Performed by: STUDENT IN AN ORGANIZED HEALTH CARE EDUCATION/TRAINING PROGRAM

## 2025-02-17 PROCEDURE — 83605 ASSAY OF LACTIC ACID: CPT

## 2025-02-17 PROCEDURE — 83735 ASSAY OF MAGNESIUM: CPT

## 2025-02-17 PROCEDURE — 80053 COMPREHEN METABOLIC PANEL: CPT

## 2025-02-17 PROCEDURE — 36415 COLL VENOUS BLD VENIPUNCTURE: CPT | Performed by: STUDENT IN AN ORGANIZED HEALTH CARE EDUCATION/TRAINING PROGRAM

## 2025-02-17 PROCEDURE — 99232 SBSQ HOSP IP/OBS MODERATE 35: CPT | Performed by: SURGERY

## 2025-02-17 PROCEDURE — 85007 BL SMEAR W/DIFF WBC COUNT: CPT

## 2025-02-17 PROCEDURE — 82948 REAGENT STRIP/BLOOD GLUCOSE: CPT

## 2025-02-17 PROCEDURE — 99232 SBSQ HOSP IP/OBS MODERATE 35: CPT | Performed by: STUDENT IN AN ORGANIZED HEALTH CARE EDUCATION/TRAINING PROGRAM

## 2025-02-17 RX ORDER — MAGNESIUM SULFATE 1 G/100ML
1 INJECTION INTRAVENOUS ONCE
Status: COMPLETED | OUTPATIENT
Start: 2025-02-17 | End: 2025-02-17

## 2025-02-17 RX ORDER — SODIUM CHLORIDE 9 MG/ML
1 INJECTION, SOLUTION INTRAVENOUS CONTINUOUS
Status: DISCONTINUED | OUTPATIENT
Start: 2025-02-17 | End: 2025-02-18 | Stop reason: HOSPADM

## 2025-02-17 RX ORDER — MAGNESIUM SULFATE HEPTAHYDRATE 40 MG/ML
2 INJECTION, SOLUTION INTRAVENOUS ONCE
Status: COMPLETED | OUTPATIENT
Start: 2025-02-17 | End: 2025-02-17

## 2025-02-17 RX ORDER — ROSUVASTATIN CALCIUM 5 MG/1
5 TABLET, COATED ORAL DAILY
Qty: 90 TABLET | Refills: 1 | Status: SHIPPED | OUTPATIENT
Start: 2025-02-17

## 2025-02-17 RX ORDER — METFORMIN HYDROCHLORIDE 500 MG/1
TABLET, EXTENDED RELEASE ORAL
Qty: 180 TABLET | Refills: 1 | Status: SHIPPED | OUTPATIENT
Start: 2025-02-17

## 2025-02-17 RX ORDER — DEXTROSE MONOHYDRATE AND SODIUM CHLORIDE 5; .9 G/100ML; G/100ML
75 INJECTION, SOLUTION INTRAVENOUS CONTINUOUS
Status: DISCONTINUED | OUTPATIENT
Start: 2025-02-17 | End: 2025-02-17

## 2025-02-17 RX ADMIN — MAGNESIUM SULFATE HEPTAHYDRATE 2 G: 40 INJECTION, SOLUTION INTRAVENOUS at 07:20

## 2025-02-17 RX ADMIN — ATENOLOL 25 MG: 25 TABLET ORAL at 08:50

## 2025-02-17 RX ADMIN — SODIUM CHLORIDE, SODIUM GLUCONATE, SODIUM ACETATE, POTASSIUM CHLORIDE, MAGNESIUM CHLORIDE, SODIUM PHOSPHATE, DIBASIC, AND POTASSIUM PHOSPHATE 1000 ML: .53; .5; .37; .037; .03; .012; .00082 INJECTION, SOLUTION INTRAVENOUS at 00:21

## 2025-02-17 RX ADMIN — INSULIN LISPRO 2 UNITS: 100 INJECTION, SOLUTION INTRAVENOUS; SUBCUTANEOUS at 12:00

## 2025-02-17 RX ADMIN — PRAVASTATIN SODIUM 40 MG: 40 TABLET ORAL at 16:20

## 2025-02-17 RX ADMIN — ENOXAPARIN SODIUM 40 MG: 40 INJECTION SUBCUTANEOUS at 18:38

## 2025-02-17 RX ADMIN — Medication 100 MG: at 08:50

## 2025-02-17 RX ADMIN — INSULIN LISPRO 1 UNITS: 100 INJECTION, SOLUTION INTRAVENOUS; SUBCUTANEOUS at 08:50

## 2025-02-17 RX ADMIN — MULTIPLE VITAMINS W/ MINERALS TAB 1 TABLET: TAB ORAL at 08:50

## 2025-02-17 RX ADMIN — SODIUM CHLORIDE 1 ML/KG/HR: 0.9 INJECTION, SOLUTION INTRAVENOUS at 16:23

## 2025-02-17 RX ADMIN — MAGNESIUM SULFATE HEPTAHYDRATE 1 G: 1 INJECTION, SOLUTION INTRAVENOUS at 09:28

## 2025-02-17 RX ADMIN — INSULIN LISPRO 1 UNITS: 100 INJECTION, SOLUTION INTRAVENOUS; SUBCUTANEOUS at 22:57

## 2025-02-17 RX ADMIN — FOLIC ACID 1 MG: 1 TABLET ORAL at 08:50

## 2025-02-17 RX ADMIN — DEXTROSE AND SODIUM CHLORIDE 75 ML/HR: 5; .9 INJECTION, SOLUTION INTRAVENOUS at 05:59

## 2025-02-17 RX ADMIN — INSULIN LISPRO 3 UNITS: 100 INJECTION, SOLUTION INTRAVENOUS; SUBCUTANEOUS at 16:21

## 2025-02-17 RX ADMIN — ASPIRIN 81 MG CHEWABLE TABLET 81 MG: 81 TABLET CHEWABLE at 08:50

## 2025-02-17 RX ADMIN — ENOXAPARIN SODIUM 40 MG: 40 INJECTION SUBCUTANEOUS at 08:50

## 2025-02-17 RX ADMIN — AMLODIPINE BESYLATE 5 MG: 5 TABLET ORAL at 08:50

## 2025-02-17 NOTE — ASSESSMENT & PLAN NOTE
Patient came in as a trauma B for acute intoxication found down at home.  Traumatic workup in the ED has been negative.  Per wife at bedside patient drinks about 2 gallons of vodka over the past 5 days.  Reports he is an intermittent drinker binges on and off a couple weeks at a time.  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  Monitor on CIWA  Replete folic acid, thiamine, multivitamin  Continue IV fluids  Seizure precautions  Neurochecks every 4 hours

## 2025-02-17 NOTE — INCIDENTAL FINDINGS
CT chest, abdomen, pelvis showing incidental finding  1.) at site of posterior right kidney mid portion, there is an indeterminate 1.0 cm hypodense lesion.  Suggest initial attempted visualization with nonemergent renal ultrasound to evaluate whether this represents a cyst  2.) a 1.2 x 0.9 cm nodule in the prevascular region could represent a prominent lymph node cannot exclude a small thymoma.  Recommend 3-month follow-up unenhanced chest CT  Recommend following up with PCP regarding above findings    Incidental finding results were discussed with the Patient and multiple famly members at bedside  by INESSA Hubbard on 02/16/25.   They expressed understanding and all questions answered.

## 2025-02-17 NOTE — ASSESSMENT & PLAN NOTE
Patient noted to have anion gap 21, lactic acidosis of 6.7.  pH 7.39, pCO2 26, HCO3 15.8.  Suspect in the setting of alcohol withdrawal  All blood work improved after continuous IV fluids for last 24 hours  Given hyperglycemia today, will switch to NS at maintenance rate

## 2025-02-17 NOTE — ASSESSMENT & PLAN NOTE
History of right pontine stroke in June 2023  Patient follows up with neurology in the outpatient setting  Continue daily ASA and statin

## 2025-02-17 NOTE — H&P
H&P - Hospitalist   Name: Elpidio Fulton 49 y.o. male I MRN: [id number]  Unit/Bed#: ED-06 I Date of Admission: 2/16/2025   Date of Service: 2/16/2025 I Hospital Day: 0     Assessment & Plan  Alcohol withdrawal (HCC)  Patient came in as a trauma B for acute intoxication found down at home.  Traumatic workup in the ED has been negative.  Per wife at bedside patient drinks about 2 gallons of vodka over the past 5 days.  Reports he is an intermittent drinker binges on and off a couple weeks at a time.  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  Monitor on CIWA  Replete folic acid, thiamine, multivitamin  Continue IV fluids  Seizure precautions  Neurochecks every 4 hours  High anion gap metabolic acidosis  Patient noted to have anion gap 21, lactic acidosis of 6.7.  pH 7.39, pCO2 26, HCO3 15.8.  Suspect in the setting of alcohol withdrawal  Patient already received 2 L normal saline  Repeat BMP at midnight  Trend lactate until < 2   Continue IV fluids  Hypertension  Hypertensive in tachycardic on admission continue PTA amlodipine and atenolol.  Suspect in the setting of alcohol withdrawal  Consider adding as needed's if needed  Type 2 diabetes mellitus without complication, without long-term current use of insulin (HCC)    Lab Results   Component Value Date    HGBA1C 6.7 (H) 08/12/2024   PTA regimen of metformin twice daily hold while inpatient  4 times daily Accu-Cheks with sliding scale insulin  Update Hemoglobin A1c  Hypoglycemia protocol  Mixed hyperlipidemia  Continue statin  Encourage lifestyle modifications  Right pontine stroke (HCC)  History of right pontine stroke in June 2023  Patient follows up with neurology in the outpatient setting  Continue daily ASA and statin  Tobacco use  NRT offered  Encourage cessation  Abnormal CAT scan  CT chest, abdomen, pelvis showing incidental finding  1.) at site of posterior right kidney mid portion, there is an indeterminate 1.0 cm  hypodense lesion.  Suggest initial attempted visualization with nonemergent renal ultrasound to evaluate whether this represents a cyst  2.) a 1.2 x 0.9 cm nodule in the prevascular region could represent a prominent lymph node cannot exclude a small thymoma.  Recommend 3-month follow-up unenhanced chest CT  Findings were discussed with family recommend outpatient follow-up with PCP.  Family and patient expressed understanding.      VTE Pharmacologic Prophylaxis:   Moderate Risk (Score 3-4) - Pharmacological DVT Prophylaxis Ordered: enoxaparin (Lovenox).  Code Status: Level 1 - Full Code   Discussion with family: Updated  (daughter, mother, and sister) at bedside.    Anticipated Length of Stay: Patient will be admitted on an inpatient basis with an anticipated length of stay of greater than 2 midnights secondary to etoh withdrawal.    History of Present Illness   Chief Complaint: Alcohol withdrawal    Elpidio Fulton is a 49 y.o. male with a PMH of stroke, hyperlipidemia, hypertension, type 2 diabetes, alcohol abuse who presents with alcohol withdrawal.  Patient was found down by family at home.  Patient reports having sustained a fall after being intoxicated.  He denies any head strike or LOC but does not quite remember events leading up to fall.  He has struggled with alcohol abuse and intermittently binges weeks at a time and then stops.  Patient is interested in detox at this time.  Patient does have history of alcohol withdrawal seizures he reports last seizure was about 5 years ago.  At present evaluation patient is awake, alert, oriented, he has mild tremors on exam, diaphoretic, tachycardic.  He denies any hallucinations at this time.  He denies any chest pain, shortness of breath, abdominal, nausea, vomiting, diarrhea, constipation.  Patient was evaluated by trauma services and trauma workup was negative.  Discussed with family at bedside and would like to speak with crisis in the morning  regarding next steps for detox.    Review of Systems   Constitutional:  Positive for activity change.       Historical Information   Past Medical History:   Diagnosis Date    Acute lymphangitis of left upper limb 11/14/2016    Alcohol withdrawal seizure (HCC) 07/31/2021    Diabetes mellitus (HCC) 06/02/2023    Difficulty walking 06/02/2023    Elevated LFTs 07/31/2021    Hypertension     Movement disorder 06/02/2023    Stroke (HCC) 06/02/2023     No past surgical history on file.  Social History     Tobacco Use    Smoking status: Every Day     Current packs/day: 0.50     Average packs/day: 0.5 packs/day for 22.1 years (11.1 ttl pk-yrs)     Types: Cigarettes     Start date: 1/1/2003    Smokeless tobacco: Never   Vaping Use    Vaping status: Never Used   Substance and Sexual Activity    Alcohol use: Not Currently    Drug use: No    Sexual activity: Yes     Partners: Female     Birth control/protection: None     E-Cigarette/Vaping    E-Cigarette Use Never User      E-Cigarette/Vaping Substances    Nicotine No     THC No     CBD No     Flavoring No     Other No     Unknown No      No family history on file.  Social History:  Marital Status: /Civil Union   Occupation: N/A  Patient Pre-hospital Living Situation: Home  Patient Pre-hospital Level of Mobility: walks  Patient Pre-hospital Diet Restrictions: None    Meds/Allergies   I have reviewed home medications with patient personally.  Prior to Admission medications    Medication Sig Start Date End Date Taking? Authorizing Provider   Alcohol Swabs 70 % PADS May substitute brand based on insurance coverage. Check glucose BID. 6/6/23   Pily Coley PA-C   amLODIPine (NORVASC) 5 mg tablet Take 1 tablet (5 mg total) by mouth daily 1/10/25   Rachell Casey MD   aspirin 81 mg chewable tablet Chew 1 tablet (81 mg total) daily Do not start before June 7, 2023. 6/7/23   Pily Coley PA-C   atenolol (TENORMIN) 25 mg tablet TAKE ONE TABLET BY MOUTH EVERY DAY 1/30/25    Rachell Casey MD   Blood Glucose Monitoring Suppl (OneTouch Verio Reflect) w/Device KIT May substitute brand based on insurance coverage. Check glucose BID. 6/6/23   Pily Coley PA-C   glucose blood (OneTouch Verio) test strip May substitute brand based on insurance coverage. Check glucose BID. 6/6/23   Pily Coley PA-C   metFORMIN (GLUCOPHAGE-XR) 500 mg 24 hr tablet TAKE TWO TABLETS BY MOUTH EVERY DAY WITH DINNER 8/17/24   Rachell Casey MD   methocarbamol (ROBAXIN) 750 mg tablet TAKE ONE TABLET BY MOUTH EVERY DAY AS NEEDED FOR MUSCLE SPASMS 6/24/24   Rachell Casey MD   rosuvastatin (CRESTOR) 5 mg tablet Take 1 tablet (5 mg total) by mouth daily 1/9/24   Rachell Casey MD     No Known Allergies    Objective :  Temp:  [98.2 °F (36.8 °C)] 98.2 °F (36.8 °C)  HR:  [109-126] 119  BP: (161-186)/() 186/94  Resp:  [20] 20  SpO2:  [95 %-98 %] 98 %  O2 Device: None (Room air)    Physical Exam  Vitals and nursing note reviewed.   Constitutional:       General: He is not in acute distress.     Appearance: He is well-developed. He is diaphoretic.      Comments: Appears slightly diaphoretic.  Skin is warm to touch.   HENT:      Head: Normocephalic and atraumatic.   Eyes:      Conjunctiva/sclera: Conjunctivae normal.   Cardiovascular:      Rate and Rhythm: Regular rhythm. Tachycardia present.      Heart sounds: No murmur heard.  Pulmonary:      Effort: Pulmonary effort is normal. No respiratory distress.      Breath sounds: Normal breath sounds. No wheezing or rales.   Abdominal:      General: There is no distension.      Palpations: Abdomen is soft.      Tenderness: There is no abdominal tenderness. There is no right CVA tenderness or guarding.   Musculoskeletal:         General: No swelling.      Cervical back: Neck supple.      Right lower leg: No edema.      Left lower leg: No edema.      Comments: Mild tremors on exam with hands extended.   Skin:     General: Skin is warm.      Capillary  Refill: Capillary refill takes less than 2 seconds.      Findings: No bruising, erythema, lesion or rash.   Neurological:      Mental Status: He is alert.      GCS: GCS eye subscore is 4. GCS verbal subscore is 5. GCS motor subscore is 6.      Comments: Patient is awake and oriented to person, place, time, and situation.  He is following commands,  Strength is equal in all 4 extremities 5 out of 5.   Psychiatric:         Mood and Affect: Mood normal.          Lines/Drains:            Lab Results: I have reviewed the following results:  Results from last 7 days   Lab Units 02/16/25  1846 02/16/25  1844   WBC Thousand/uL  --  11.04*   HEMOGLOBIN g/dL  --  17.3*   I STAT HEMOGLOBIN g/dl 16.7  --    HEMATOCRIT %  --  51.7*   HEMATOCRIT, ISTAT % 49  --    PLATELETS Thousands/uL  --  354   SEGS PCT %  --  50   LYMPHO PCT %  --  44   MONO PCT %  --  3*   EOS PCT %  --  1     Results from last 7 days   Lab Units 02/16/25 2015   SODIUM mmol/L 145   POTASSIUM mmol/L 4.1   CHLORIDE mmol/L 107   CO2 mmol/L 17*   BUN mg/dL 11   CREATININE mg/dL 0.67   ANION GAP mmol/L 21*   CALCIUM mg/dL 8.4   ALBUMIN g/dL 4.3   TOTAL BILIRUBIN mg/dL 0.45   ALK PHOS U/L 134*   ALT U/L 43   AST U/L 35   GLUCOSE RANDOM mg/dL 129             Lab Results   Component Value Date    HGBA1C 6.7 (H) 08/12/2024    HGBA1C 6.6 01/03/2024    HGBA1C 6.5 08/31/2023     Results from last 7 days   Lab Units 02/16/25  1938   LACTIC ACID mmol/L 6.7*       Imaging Results Review: I reviewed radiology reports from this admission including: CT chest, CT abdomen/pelvis, CT head, and CT C-spine.  Other Study Results Review: EKG was reviewed.     Administrative Statements   I have spent a total time of 75 minutes in caring for this patient on the day of the visit/encounter including Diagnostic results, Prognosis, Risks and benefits of tx options, Instructions for management, Patient and family education, Importance of tx compliance, Risk factor reductions, Impressions,  Counseling / Coordination of care, Documenting in the medical record, Reviewing / ordering tests, medicine, procedures  , Obtaining or reviewing history  , and Communicating with other healthcare professionals .    ** Please Note: This note has been constructed using a voice recognition system. **

## 2025-02-17 NOTE — ASSESSMENT & PLAN NOTE
Admitted for alcohol withdrawal on 2/16/2025 and has a history of seizures (2) per sister  Initially given as a trauma as he was found down, but no traumatic injury identified  Last CIWA 1.  Patient denies symptoms of withdrawal presently .  Last drink 2/16/2025 at 16:30  Continue to monitor on CIWA  Replete folic acid, thiamine, multivitamin  Continue IV fluids  Seizure precautions

## 2025-02-17 NOTE — ASSESSMENT & PLAN NOTE
History of right pontine stroke in June 2023  Sister is worried that this was also a consequence of alcohol withdrawal  Patient follows up with neurology in the outpatient setting  Continue daily ASA and statin

## 2025-02-17 NOTE — ASSESSMENT & PLAN NOTE
Patient noted to have anion gap 21, lactic acidosis of 6.7.  pH 7.39, pCO2 26, HCO3 15.8.  Suspect in the setting of alcohol withdrawal  Patient already received 2 L normal saline  Repeat BMP at midnight  Trend lactate until < 2   Continue IV fluids

## 2025-02-17 NOTE — UTILIZATION REVIEW
Initial Clinical Review    Admission: Date/Time/Statement:   Admission Orders (From admission, onward)       Ordered        02/16/25 2132  INPATIENT ADMISSION  Once                          Orders Placed This Encounter   Procedures    INPATIENT ADMISSION     Standing Status:   Standing     Number of Occurrences:   1     Level of Care:   Med Surg [16]     Estimated length of stay:   More than 2 Midnights     Certification:   I certify that inpatient services are medically necessary for this patient for a duration of greater than two midnights. See H&P and MD Progress Notes for additional information about the patient's course of treatment.     ED Arrival Information       Expected   -    Arrival   2/16/2025 18:27    Acuity   Emergent              Means of arrival   Ambulance    Escorted by   [facility]    Service   Hospitalist    Admission type   Emergency              Arrival complaint   [facility] EMS - Intoxication             Chief Complaint   Patient presents with    Fall     ETOH. GCS14.        Initial Presentation: 49 y.o. male  with a PMH of stroke, hyperlipidemia, hypertension, type 2 diabetes, alcohol abuse who presents with alcohol withdrawal. Patient was found down by family at home. Patient reports having sustained a fall after being intoxicated. He denies any head strike or LOC but does not quite remember events leading up to fall. He has struggled with alcohol abuse and intermittently binges weeks at a time and then stops. Patient is interested in detox at this time. Patient does have history of alcohol withdrawal seizures he reports last seizure was about 5 years ago. At present evaluation patient is awake, alert, oriented, he has mild tremors on exam, diaphoretic, tachycardic. Patient was evaluated by trauma services and trauma workup was negative. Plan: Inpatient admission for evaluation and treatment of alcohol withdrawal, high anion gap metabolic acidosis, HTN, DM, HLD, hx of stroke, tobacco abuse,  abnormal CT scan: CIWA protocol, folic acid, thiamine, MVI, IV fluids, seizure precautions, neuro checks q 4 hrs, BMP at midnight, trend lactate, IV fluids, continue home amlodipine and atenolol, hold metformin, HgbA1c, SSI, continue statin.    Anticipated Length of Stay/Certification Statement: Patient will be admitted on an inpatient basis with an anticipated length of stay of greater than 2 midnights secondary to etoh withdrawal.     Date: 2/17   Day 2:     Internal medicine: Last drink 2/16/2025 at 16:30. Continue to monitor CIWA. Continue folic acid, thiamine, MVI, IV fluids. Seizure precautions. Continue home amlodipine and atenolol, statin, ASA.     ED Treatment-Medication Administration from 02/16/2025 1827 to 02/17/2025 1314         Date/Time Order Dose Route Action     02/16/2025 1927 tetanus-diphtheria-acellular pertussis (BOOSTRIX) IM injection 0.5 mL 0.5 mL Intramuscular Given     02/16/2025 1843 multi-electrolyte (ISOLYTE-S PH 7.4) bolus 1,000 mL Intravenous New Bag     02/16/2025 1900 iohexol (OMNIPAQUE) 350 MG/ML injection (MULTI-DOSE) 100 mL 100 mL Intravenous Given     02/16/2025 2015 multi-electrolyte (ISOLYTE-S PH 7.4) bolus 1,000 mL 1,000 mL Intravenous New Bag     02/16/2025 2255 thiamine tablet 100 mg 100 mg Oral Given     02/17/2025 0850 thiamine tablet 100 mg 100 mg Oral Given     02/16/2025 2255 folic acid (FOLVITE) tablet 1 mg 1 mg Oral Given     02/17/2025 0850 folic acid (FOLVITE) tablet 1 mg 1 mg Oral Given     02/16/2025 2255 multivitamin-minerals (CENTRUM) tablet 1 tablet 1 tablet Oral Given     02/17/2025 0850 multivitamin-minerals (CENTRUM) tablet 1 tablet 1 tablet Oral Given     02/17/2025 0850 amLODIPine (NORVASC) tablet 5 mg 5 mg Oral Given     02/17/2025 0850 aspirin chewable tablet 81 mg 81 mg Oral Given     02/17/2025 0850 atenolol (TENORMIN) tablet 25 mg 25 mg Oral Given     02/16/2025 2255 enoxaparin (LOVENOX) subcutaneous injection 40 mg 40 mg Subcutaneous Given      02/17/2025 0850 enoxaparin (LOVENOX) subcutaneous injection 40 mg 40 mg Subcutaneous Given     02/17/2025 0850 insulin lispro (HumALOG/ADMELOG) 100 units/mL subcutaneous injection 1-6 Units 1 Units Subcutaneous Given     02/17/2025 1200 insulin lispro (HumALOG/ADMELOG) 100 units/mL subcutaneous injection 1-6 Units 2 Units Subcutaneous Given     02/16/2025 2253 multi-electrolyte (PLASMALYTE-A/ISOLYTE-S PH 7.4) IV solution 100 mL/hr Intravenous New Bag     02/16/2025 2255 LORazepam (ATIVAN) tablet 0.5 mg 0.5 mg Oral Given     02/17/2025 0021 multi-electrolyte (ISOLYTE-S PH 7.4) bolus 1,000 mL 1,000 mL Intravenous New Bag     02/17/2025 0559 dextrose 5 % and sodium chloride 0.9 % infusion 75 mL/hr Intravenous New Bag     02/17/2025 0720 magnesium sulfate 2 g/50 mL IVPB (premix) 2 g 2 g Intravenous New Bag     02/17/2025 0928 magnesium sulfate IVPB (premix) SOLN 1 g 1 g Intravenous New Bag            Scheduled Medications:  amLODIPine, 5 mg, Oral, Daily  aspirin, 81 mg, Oral, Daily  atenolol, 25 mg, Oral, Daily  enoxaparin, 40 mg, Subcutaneous, BID  folic acid, 1 mg, Oral, Daily  insulin lispro, 1-6 Units, Subcutaneous, 4x Daily (AC & HS)  multivitamin-minerals, 1 tablet, Oral, Daily  nicotine, 1 patch, Transdermal, Daily  pravastatin, 40 mg, Oral, Daily With Dinner  thiamine, 100 mg, Oral, Daily      Continuous IV Infusions:  dextrose 5 % and sodium chloride 0.9 %, 75 mL/hr, Intravenous, Continuous      PRN Meds:     ED Triage Vitals   Temperature Pulse Respirations Blood Pressure SpO2 Pain Score   02/16/25 1843 02/16/25 1835 02/16/25 1835 02/16/25 1835 02/16/25 1835 02/17/25 1202   98.2 °F (36.8 °C) (!) 126 20 (!) 162/104 97 % No Pain     Weight (last 2 days)       Date/Time Weight    02/16/25 1845 87.6 (193.12)            Vital Signs (last 3 days)       Date/Time Temp Pulse Resp BP MAP (mmHg) SpO2 O2 Device Patient Position - Orthostatic VS [place] Coma Scale Score Malik Total Pain    02/17/25 1202 -- -- -- -- --  -- -- -- -- -- No Pain    02/17/25 1200 -- 79 18 157/84 115 -- -- Lying -- -- --    02/17/25 1100 -- 79 -- -- -- -- -- -- 15 1 --    02/17/25 1036 -- 80 16 151/85 113 -- -- Lying -- -- --    02/17/25 0700 -- -- -- -- -- -- -- -- 15 1 --    02/17/25 0445 -- 82 18 134/64 92 96 % None (Room air) Lying -- -- --    02/17/25 0443 -- -- -- 134/64 -- -- -- -- -- -- --    02/17/25 0300 -- 90 -- 136/72 -- -- -- -- 15 1 --    02/17/25 0100 -- -- -- -- -- -- -- -- 15 -- --    02/17/25 0015 -- 102 -- 150/76 102 97 % -- -- -- -- --    02/17/25 0000 -- 99 -- 134/69 92 96 % -- -- -- -- --    02/16/25 2330 -- 93 -- 149/70 101 96 % -- -- -- -- --    02/16/25 2315 -- 94 -- 154/66 103 97 % -- -- -- -- --    02/16/25 2300 -- 102 -- 165/77 111 96 % -- -- 15 3 --    02/16/25 2230 -- 111 -- 176/76 117 96 % -- -- -- -- --    02/16/25 2030 -- 119 20 186/94 -- 98 % -- -- 15 -- --    02/16/25 2000 -- 110 20 166/85 -- 96 % None (Room air) -- 15 -- --    02/16/25 1945 -- 109 20 161/83 -- 95 % None (Room air) -- 15 -- --    02/16/25 1930 -- 111 -- 167/96 124 97 % -- -- 15 -- --    02/16/25 1843 98.2 °F (36.8 °C) -- -- -- -- -- -- -- -- -- --    02/16/25 1835 -- 126 20 162/104 -- 97 % -- -- -- -- --           CIWA-Ar Score       Row Name 02/17/25 1100 02/17/25 0700 02/17/25 0300       CIWA-Ar    BP -- -- 136/72    Pulse 79 -- 90    Nausea and Vomiting 0 0 0    Tactile Disturbances 0 0 0    Tremor 0 1 1    Auditory Disturbances 0 0 0    Paroxysmal Sweats 0 0 0    Visual Disturbances 0 0 0    Anxiety 0 0 0    Headache, Fullness in Head 1 0 0    Agitation 0 0 0    Orientation and Clouding of Sensorium 0 0 0    CIWA-Ar Total 1 1 1      Row Name 02/16/25 2300             CIWA-Ar    Nausea and Vomiting 0      Tactile Disturbances 0      Tremor 2      Auditory Disturbances 0      Paroxysmal Sweats 0      Visual Disturbances 0      Anxiety 1      Headache, Fullness in Head 0      Agitation 0      Orientation and Clouding of Sensorium 0      CIWA-Ar Total  3                      Pertinent Labs/Diagnostic Test Results:   Radiology:  TRAUMA - CT head wo contrast   Final Interpretation by Bal Gilliland MD (02/16 1939)      No acute intracranial abnormality.         I personally discussed this study with LAURYN ULLRICH on 2/16/2025 7:39 PM.            Workstation performed: [id number]         TRAUMA - CT spine cervical wo contrast   Final Interpretation by Bal Gilliland MD (02/16 1939)      No cervical spine fracture or traumatic malalignment.         I personally discussed this study with LAURYN ULLRICH on 2/16/2025 7:39 PM.            Workstation performed: [id number]         TRAUMA - CT chest abdomen pelvis w contrast   Final Interpretation by Bal Gillilnad MD (02/16 1939)      1. No findings of acute traumatic injury in the chest, abdomen or pelvis.      2. At the site of a posterior right kidney midportion, there is and indeterminate 1.0 cm hypodense lesion. Suggest initial attempted visualization with nonemergent renal ultrasound to evaluate whether this represents a cyst.      3. A 1.2 x 0.9 cm nodule in the prevascular region could represent a prominent lymph node, cannot exclude a small thymoma. Recommend a 3-month follow-up unenhanced chest CT.         I personally discussed this study with LAURYN ULLRICH on 2/16/2025 7:39 PM.            Workstation performed: [id number]         XR Trauma multiple ([facility] trauma bay ONLY)   Preliminary Result by Rod Tee (02/17 1044)      No acute cardiopulmonary disease.            Workstation performed: [id number]         XR chest 1 view   Final Interpretation by Rod Tee (02/17 1044)      No acute cardiopulmonary disease.            Workstation performed: [id number]           Cardiology:  No orders to display     GI:  No orders to display           Results from last 7 days   Lab Units 02/17/25  0447 02/16/25  1846 02/16/25  1844   WBC Thousand/uL 10.47*  --  11.04*   HEMOGLOBIN g/dL 13.5  --  17.3*   I  STAT HEMOGLOBIN g/dl  --  16.7  --    HEMATOCRIT % 37.8  --  51.7*   HEMATOCRIT, ISTAT %  --  49  --    PLATELETS Thousands/uL 273  --  354   TOTAL NEUT ABS Thousands/µL  --   --  5.63   BANDS PCT % 1  --   --          Results from last 7 days   Lab Units 02/17/25 0447 02/16/25 2302 02/16/25 2015 02/16/25  1846   SODIUM mmol/L 136 140 145  --    POTASSIUM mmol/L 4.1 4.0 4.1  --    CHLORIDE mmol/L 99 102 107  --    CO2 mmol/L 24 16* 17*  --    CO2, I-STAT mmol/L  --   --   --  17*   ANION GAP mmol/L 13 22* 21*  --    BUN mg/dL 10 10 11  --    CREATININE mg/dL 0.72 0.69 0.67  --    EGFR ml/min/1.73sq m 108 110 112  --    CALCIUM mg/dL 8.3* 8.4 8.4  --    CALCIUM, IONIZED, ISTAT mmol/L  --   --   --  0.89*   MAGNESIUM mg/dL 1.8*  --  1.9  --      Results from last 7 days   Lab Units 02/17/25 0447 02/16/25 2015   AST U/L 28 35   ALT U/L 35 43   ALK PHOS U/L 106* 134*   TOTAL PROTEIN g/dL 6.2* 6.6   ALBUMIN g/dL 3.9 4.3   TOTAL BILIRUBIN mg/dL 0.53 0.45     Results from last 7 days   Lab Units 02/17/25  1055 02/17/25  0849 02/16/25  2251   POC GLUCOSE mg/dl 199* 165* 116     Results from last 7 days   Lab Units 02/17/25 0447 02/16/25 2302 02/16/25 2015   GLUCOSE RANDOM mg/dL 138 109 129         Results from last 7 days   Lab Units 02/16/25 2302   HEMOGLOBIN A1C % 6.4*   EAG mg/dl 137     Beta- Hydroxybutyrate   Date Value Ref Range Status   02/16/2025 1.29 (H) 0.02 - 0.27 mmol/L Final          Results from last 7 days   Lab Units 02/17/25  1103 02/17/25 0447 02/16/25 2302   PH MARYSOL  7.515* 7.513* 7.434*   PCO2 MARYSOL mm Hg 29.1* 27.5* 24.9*   PO2 MARYSOL mm Hg 103.8* 119.4* 132.8*   HCO3 MARYSOL mmol/L 23.0* 21.6* 16.3*   BASE EXC MARYSOL mmol/L 1.1 -0.1 -5.8   O2 CONTENT MARYSOL ml/dL 18.7 19.2 21.6   O2 HGB, VENOUS % 95.2* 96.9* 95.9*     Results from last 7 days   Lab Units 02/16/25  1846   PH, MARYSOL I-STAT  7.393   PCO2, MARYSOL ISTAT mm HG 26.0*   PO2, MARYSOL ISTAT mm HG 59.0*   HCO3, MARYSOL ISTAT mmol/L 15.8*   I STAT BASE EXC mmol/L  -7*   I STAT O2 SAT % 91*     Results from last 7 days   Lab Units 02/16/25 2015   CK TOTAL U/L 202           Results from last 7 days   Lab Units 02/17/25  1103 02/17/25  0447 02/17/25  0119 02/16/25  2302 02/16/25  1938   LACTIC ACID mmol/L 1.1 2.2* 4.6* 6.2* 6.7*           Results from last 7 days   Lab Units 02/16/25 2015 02/16/25  1844   ETHANOL LVL mg/dL  --  369*   ACETAMINOPHEN LVL ug/mL <2*  --    SALICYLATE LVL mg/dL <5  --          Past Medical History:   Diagnosis Date    Acute lymphangitis of left upper limb 11/14/2016    Alcohol withdrawal seizure (HCC) 07/31/2021    Diabetes mellitus (HCC) 06/02/2023    Difficulty walking 06/02/2023    Elevated LFTs 07/31/2021    Hypertension     Movement disorder 06/02/2023    Stroke (HCC) 06/02/2023     Present on Admission:   Alcohol withdrawal (HCC)   Type 2 diabetes mellitus without complication, without long-term current use of insulin (HCC)   Tobacco use   Right pontine stroke (HCC)   Mixed hyperlipidemia   Hypertension      Admitting Diagnosis: Unspecified multiple injuries, initial encounter [T07.XXXA]  Age/Sex: 49 y.o. male    Network Utilization Review Department  ATTENTION: Please call with any questions or concerns to [phone] and carefully listen to the prompts so that you are directed to the right person. All voicemails are confidential.   For Discharge needs, contact Care Management DC Support Team at [phone] opt. [phone]  Send all requests for admission clinical reviews, approved or denied determinations and any other requests to dedicated fax number below belonging to the campus where the patient is receiving treatment. List of dedicated fax numbers for the Facilities:  FACILITY NAME UR FAX NUMBER   ADMISSION DENIALS (Administrative/Medical Necessity) [phone]   DISCHARGE SUPPORT TEAM (NETWORK) [phone]   PARENT CHILD HEALTH (Maternity/NICU/Pediatrics) [phone]   [facility] [phone]   [facility]  [facility] [phone]   [facility] [phone]   [facility] [phone]   [facility] [phone]   [facility] [phone]   [facility] [phone]   [facility] [phone]   [facility] [phone]   Count includes the [facility] [phone]   [facility] [phone]   [facility] [phone]

## 2025-02-17 NOTE — PROGRESS NOTES
Progress Note - Hospitalist   Name: Elpidio Fulton 49 y.o. male I MRN: [id number]  Unit/Bed#: ED-06 I Date of Admission: 2/16/2025   Date of Service: 2/17/2025 I Hospital Day: 1    Assessment & Plan  Alcohol withdrawal (HCC)  Admitted for alcohol withdrawal on 2/16/2025 and has a history of seizures (2) per sister  Initially given as a trauma as he was found down, but no traumatic injury identified  Last CIWA 1.  Patient denies symptoms of withdrawal presently .  Last drink 2/16/2025 at 16:30  Continue to monitor on CIWA  Replete folic acid, thiamine, multivitamin  Continue IV fluids  Seizure precautions  High anion gap metabolic acidosis (Resolved: 2/17/2025)  Patient noted to have anion gap 21, lactic acidosis of 6.7.  pH 7.39, pCO2 26, HCO3 15.8.  Suspect in the setting of alcohol withdrawal  All blood work improved after continuous IV fluids for last 24 hours  Given hyperglycemia today, will switch to NS at maintenance rate  Hypertension  Hypertensive on admission likely secondary to withdrawal   Improving  Continue PTA amlodipine and atenolol.  Type 2 diabetes mellitus without complication, without long-term current use of insulin (HCC)    Lab Results   Component Value Date    HGBA1C 6.4 (H) 02/16/2025   On metformin at home  Continue glucose checks with meals and SSI  Hypoglycemia protocol  Mixed hyperlipidemia  Continue statin  Right pontine stroke (HCC)  History of right pontine stroke in June 2023  Sister is worried that this was also a consequence of alcohol withdrawal  Patient follows up with neurology in the outpatient setting  Continue daily ASA and statin  Tobacco use  NRT offered  Encourage cessation  Abnormal CAT scan  CT chest, abdomen, pelvis showing incidental finding  1.) at site of posterior right kidney mid portion, there is an indeterminate 1.0 cm hypodense lesion.  Suggest initial attempted visualization with nonemergent renal ultrasound to evaluate whether this represents a cyst  2.) a  1.2 x 0.9 cm nodule in the prevascular region could represent a prominent lymph node cannot exclude a small thymoma.  Recommend 3-month follow-up unenhanced chest CT  Findings were discussed with family recommend outpatient follow-up with PCP.  Family and patient expressed understanding.    VTE Pharmacologic Prophylaxis:   Moderate Risk (Score 3-4) - Pharmacological DVT Prophylaxis Ordered: enoxaparin (Lovenox).    Mobility:   Basic Mobility Inpatient Raw Score: 24  JH-HLM Goal: 8: Walk 250 feet or more  JH-HLM Achieved: 8: Walk 250 feet ot more  JH-HLM Goal achieved. Continue to encourage appropriate mobility.    Patient Centered Rounds: I performed bedside rounds with nursing staff today.   Discussions with Specialists or Other Care Team Provider:  Toxicology at [place]    Education and Discussions with Family / Patient: Updated  (sister) at bedside.    Current Length of Stay: 1 day(s)  Current Patient Status: Inpatient   Certification Statement: The patient will continue to require additional inpatient hospital stay due to monitoring for signs and symptoms of worsening alcohol withdrawal  Discharge Plan: Anticipate discharge in 24-48 hrs to home.  Or rehab    Code Status: Level 1 - Full Code    Subjective   Patient seen with this afternoon in the ED with his sister at bedside.  He is frustrated and wants to leave the hospital, but his sister is more worried about his blood work from yesterday and potential for withdrawal symptoms.  He has a history of seizures twice in the past.  No CIWA needs over the last 24 hours.  Given history of seizures, so we will continue to monitor over the next 24 hours.    Objective :  Temp:  [98.2 °F (36.8 °C)] 98.2 °F (36.8 °C)  HR:  [] 75  BP: (134-186)/() 142/71  Resp:  [16-20] 16  SpO2:  [95 %-98 %] 96 %  O2 Device: None (Room air)    Body mass index is 32.14 kg/m².     Input and Output Summary (last 24 hours):     Intake/Output Summary (Last 24  hours) at 2/17/2025 1614  Last data filed at 2/17/2025 0910  Gross per 24 hour   Intake 1050 ml   Output --   Net 1050 ml       Physical Exam  Constitutional:       General: He is not in acute distress.     Appearance: Normal appearance. He is diaphoretic. He is not ill-appearing.   HENT:      Head: Normocephalic and atraumatic.      Nose: Nose normal.      Mouth/Throat:      Mouth: Mucous membranes are moist.      Pharynx: No posterior oropharyngeal erythema.   Eyes:      General: No scleral icterus.     Extraocular Movements: Extraocular movements intact.      Pupils: Pupils are equal, round, and reactive to light.   Cardiovascular:      Rate and Rhythm: Regular rhythm. Tachycardia present.      Heart sounds: No murmur heard.  Pulmonary:      Effort: Pulmonary effort is normal. No respiratory distress.      Breath sounds: No wheezing, rhonchi or rales.   Abdominal:      Palpations: Abdomen is soft.      Comments: Mildly distended, nontender, no fluid wave appreciated, tympanic, no organomegaly   Musculoskeletal:      Right lower leg: No edema.      Left lower leg: No edema.   Skin:     Coloration: Skin is not jaundiced.      Findings: No erythema.   Neurological:      General: No focal deficit present.      Mental Status: He is alert.           Lines/Drains:        Telemetry:  Telemetry Orders (From admission, onward)               24 Hour Telemetry Monitoring  Continuous x 24 Hours (Telem)        Question:  Reason for 24 Hour Telemetry  Answer:  Alcohol withdrawal and CIWA >7, electrolyte abnormalities, abnormal ECG and/or heart disease                     Telemetry Reviewed: Sinus Tachycardia  Indication for Continued Telemetry Use: No indication for continued use. Will discontinue.                Lab Results: I have reviewed the following results:   Results from last 7 days   Lab Units 02/17/25  0447 02/16/25  1846 02/16/25  1844   WBC Thousand/uL 10.47*  --  11.04*   HEMOGLOBIN g/dL 13.5  --  17.3*   I STAT  HEMOGLOBIN   --    < >  --    HEMATOCRIT % 37.8  --  51.7*   HEMATOCRIT, ISTAT   --    < >  --    PLATELETS Thousands/uL 273  --  354   BANDS PCT % 1  --   --    SEGS PCT %  --   --  50   LYMPHO PCT % 26  --  44   MONO PCT % 2*  --  3*   EOS PCT % 1  --  1    < > = values in this interval not displayed.     Results from last 7 days   Lab Units 02/17/25  0447   SODIUM mmol/L 136   POTASSIUM mmol/L 4.1   CHLORIDE mmol/L 99   CO2 mmol/L 24   BUN mg/dL 10   CREATININE mg/dL 0.72   ANION GAP mmol/L 13   CALCIUM mg/dL 8.3*   ALBUMIN g/dL 3.9   TOTAL BILIRUBIN mg/dL 0.53   ALK PHOS U/L 106*   ALT U/L 35   AST U/L 28   GLUCOSE RANDOM mg/dL 138         Results from last 7 days   Lab Units 02/17/25  1055 02/17/25  0849 02/16/25  2251   POC GLUCOSE mg/dl 199* 165* 116     Results from last 7 days   Lab Units 02/16/25  2302   HEMOGLOBIN A1C % 6.4*     Results from last 7 days   Lab Units 02/17/25  1103 02/17/25  0447 02/17/25  0119 02/16/25  2302   LACTIC ACID mmol/L 1.1 2.2* 4.6* 6.2*       Recent Cultures (last 7 days):               Last 24 Hours Medication List:     Current Facility-Administered Medications:     amLODIPine (NORVASC) tablet 5 mg, Daily    aspirin chewable tablet 81 mg, Daily    atenolol (TENORMIN) tablet 25 mg, Daily    dextrose 5 % and sodium chloride 0.9 % infusion, Continuous, Last Rate: 75 mL/hr (02/17/25 0559)    enoxaparin (LOVENOX) subcutaneous injection 40 mg, BID    folic acid (FOLVITE) tablet 1 mg, Daily    insulin lispro (HumALOG/ADMELOG) 100 units/mL subcutaneous injection 1-6 Units, 4x Daily (AC & HS) **AND** Fingerstick Glucose (POCT), 4x Daily AC and at bedtime    multivitamin-minerals (CENTRUM) tablet 1 tablet, Daily    nicotine (NICODERM CQ) 7 mg/24hr TD 24 hr patch 1 patch, Daily    pravastatin (PRAVACHOL) tablet 40 mg, Daily With Dinner    thiamine tablet 100 mg, Daily    Administrative Statements   Today, Patient Was Seen By: Antonieta Muir MD      **Please Note: This note may have been  constructed using a voice recognition system.**

## 2025-02-17 NOTE — ASSESSMENT & PLAN NOTE
CT chest, abdomen, pelvis showing incidental finding  1.) at site of posterior right kidney mid portion, there is an indeterminate 1.0 cm hypodense lesion.  Suggest initial attempted visualization with nonemergent renal ultrasound to evaluate whether this represents a cyst  2.) a 1.2 x 0.9 cm nodule in the prevascular region could represent a prominent lymph node cannot exclude a small thymoma.  Recommend 3-month follow-up unenhanced chest CT  Findings were discussed with family recommend outpatient follow-up with PCP.  Family and patient expressed understanding.

## 2025-02-17 NOTE — ASSESSMENT & PLAN NOTE
Hypertensive in tachycardic on admission continue PTA amlodipine and atenolol.  Suspect in the setting of alcohol withdrawal  Consider adding as needed's if needed

## 2025-02-17 NOTE — ASSESSMENT & PLAN NOTE
Lab Results   Component Value Date    HGBA1C 6.4 (H) 02/16/2025   On metformin at home  Continue glucose checks with meals and SSI  Hypoglycemia protocol

## 2025-02-17 NOTE — PROGRESS NOTES
"Progress Note - Trauma   Name: Elpidio Fulton 49 y.o. male I MRN: [id number]  Unit/Bed#: ED-06 I Date of Admission: 2/16/2025   Date of Service: 2/17/2025 I Hospital Day: 1    Assessment & Plan  Fall, initial encounter  Patient and wife adamantly denying fall  No further imaging or workup indicated based on tertiary evaluation  Continue medical workup.  Trauma team will sign off.  Alcoholic intoxication with complication (HCC)  Admitted to medicine for alcohol detox    VTE Prophylaxis: VTE covered by:  enoxaparin, Subcutaneous, 40 mg at 02/16/25 2255        Disposition: Continue medical workup.  Trauma team will sign off.    TRAUMA TERTIARY SURVEY  Summary of Diagnosed Injuries: Alcohol intoxication, possible fall    Transfer from: Not a transfer    Mechanism of Injury:Fall     Chief Complaint: \"I did not fall\"    24 Hour Events : No events reported overnight  Subjective : Patient and wife adamantly deny fall.  Denies pain or discomfort.  Has been ambulatory without difficulty.  No other complaints offered.    Objective :  Temp:  [98.2 °F (36.8 °C)] 98.2 °F (36.8 °C)  HR:  [] 82  BP: (134-186)/() 134/64  Resp:  [18-20] 18  SpO2:  [95 %-98 %] 96 %  O2 Device: None (Room air)    I/O         02/15 0701  02/16 0700 02/16 0701  02/17 0700 02/17 0701  02/18 0700    I.V. (mL/kg)  1000 (11.4)     Total Intake(mL/kg)  1000 (11.4)     Net  +1000                    Physical Exam:   GENERAL APPEARANCE: No acute distress. Clinically sober.   NEURO: GCS 15  HEENT: Normocephalic, atraumatic.  Neck supple.  CV: Regular rate and rhythm.  +2 radial and dorsalis pedis pulse, bilaterally.  LUNGS: Clear to auscultation, bilaterally.  Chest wall is nontender.  GI: Abdomen is soft nontender.  : Pelvis is stable.  MSK: Moving all extremities.  No deformities.  SKIN: Warm, dry.          Lab Results: I have reviewed the following results:  Recent Labs     02/16/25  1846 02/16/25  1938 02/17/25  0447   WBC  --   --  10.47* "   HGB 16.7  --  13.5   HCT 49  --  37.8   PLT  --   --  273   BANDSPCT  --   --  1   SODIUM  --    < > 136   K  --    < > 4.1   CL  --    < > 99   CO2 17*   < > 24   BUN  --    < > 10   CREATININE  --    < > 0.72   GLUC  --    < > 138   CAIONIZED 0.89*  --   --    MG  --    < > 1.8*   AST  --    < > 28   ALT  --    < > 35   ALB  --    < > 3.9   TBILI  --    < > 0.53   ALKPHOS  --    < > 106*   LACTICACID  --    < > 2.2*    < > = values in this interval not displayed.       Imaging Results Review: No pertinent imaging studies reviewed.  Other Study Results Review: No additional pertinent studies reviewed.

## 2025-02-17 NOTE — ASSESSMENT & PLAN NOTE
Hypertensive on admission likely secondary to withdrawal   Improving  Continue PTA amlodipine and atenolol.

## 2025-02-17 NOTE — QUICK NOTE
Cervical Collar Clearance:    The patient had a CT scan of the cervical spine demonstrating no acute injury. On exam, the patient had no midline point tenderness or paresthesias/numbness/weakness in the extremities. The patient had full range of motion (was then able to flex, extend, and rotate head laterally) without pain. There were no distracting injuries.     The patient's cervical spine was cleared radiologically and clinically. Cervical collar removed at this time.     Amie Melendez MD  2/16/2025 8:45 PM

## 2025-02-18 ENCOUNTER — TRANSITIONAL CARE MANAGEMENT (OUTPATIENT)
Dept: FAMILY MEDICINE CLINIC | Facility: CLINIC | Age: 50
End: 2025-02-18

## 2025-02-18 VITALS
TEMPERATURE: 98 F | HEART RATE: 57 BPM | DIASTOLIC BLOOD PRESSURE: 85 MMHG | SYSTOLIC BLOOD PRESSURE: 139 MMHG | BODY MASS INDEX: 32.14 KG/M2 | WEIGHT: 193.12 LBS | RESPIRATION RATE: 16 BRPM | OXYGEN SATURATION: 96 %

## 2025-02-18 LAB
ANION GAP SERPL CALCULATED.3IONS-SCNC: 9 MMOL/L (ref 4–13)
BUN SERPL-MCNC: 12 MG/DL (ref 5–25)
CALCIUM SERPL-MCNC: 9 MG/DL (ref 8.4–10.2)
CHLORIDE SERPL-SCNC: 105 MMOL/L (ref 96–108)
CO2 SERPL-SCNC: 24 MMOL/L (ref 21–32)
CREAT SERPL-MCNC: 0.65 MG/DL (ref 0.6–1.3)
ERYTHROCYTE [DISTWIDTH] IN BLOOD BY AUTOMATED COUNT: 14 % (ref 11.6–15.1)
GFR SERPL CREATININE-BSD FRML MDRD: 113 ML/MIN/1.73SQ M
GLUCOSE SERPL-MCNC: 154 MG/DL (ref 65–140)
GLUCOSE SERPL-MCNC: 199 MG/DL (ref 65–140)
GLUCOSE SERPL-MCNC: 222 MG/DL (ref 65–140)
HCT VFR BLD AUTO: 38.3 % (ref 36.5–49.3)
HGB BLD-MCNC: 13.4 G/DL (ref 12–17)
MAGNESIUM SERPL-MCNC: 2.1 MG/DL (ref 1.9–2.7)
MCH RBC QN AUTO: 33.5 PG (ref 26.8–34.3)
MCHC RBC AUTO-ENTMCNC: 35 G/DL (ref 31.4–37.4)
MCV RBC AUTO: 96 FL (ref 82–98)
PLATELET # BLD AUTO: 207 THOUSANDS/UL (ref 149–390)
PMV BLD AUTO: 9.5 FL (ref 8.9–12.7)
POTASSIUM SERPL-SCNC: 3.8 MMOL/L (ref 3.5–5.3)
RBC # BLD AUTO: 4 MILLION/UL (ref 3.88–5.62)
SODIUM SERPL-SCNC: 138 MMOL/L (ref 135–147)
WBC # BLD AUTO: 5.49 THOUSAND/UL (ref 4.31–10.16)

## 2025-02-18 PROCEDURE — 83735 ASSAY OF MAGNESIUM: CPT | Performed by: STUDENT IN AN ORGANIZED HEALTH CARE EDUCATION/TRAINING PROGRAM

## 2025-02-18 PROCEDURE — 80048 BASIC METABOLIC PNL TOTAL CA: CPT | Performed by: STUDENT IN AN ORGANIZED HEALTH CARE EDUCATION/TRAINING PROGRAM

## 2025-02-18 PROCEDURE — 82948 REAGENT STRIP/BLOOD GLUCOSE: CPT

## 2025-02-18 PROCEDURE — 99238 HOSP IP/OBS DSCHRG MGMT 30/<: CPT | Performed by: STUDENT IN AN ORGANIZED HEALTH CARE EDUCATION/TRAINING PROGRAM

## 2025-02-18 PROCEDURE — 85027 COMPLETE CBC AUTOMATED: CPT | Performed by: STUDENT IN AN ORGANIZED HEALTH CARE EDUCATION/TRAINING PROGRAM

## 2025-02-18 RX ORDER — LANOLIN ALCOHOL/MO/W.PET/CERES
100 CREAM (GRAM) TOPICAL DAILY
Qty: 30 TABLET | Refills: 0 | Status: SHIPPED | OUTPATIENT
Start: 2025-02-19

## 2025-02-18 RX ADMIN — AMLODIPINE BESYLATE 5 MG: 5 TABLET ORAL at 08:16

## 2025-02-18 RX ADMIN — ASPIRIN 81 MG CHEWABLE TABLET 81 MG: 81 TABLET CHEWABLE at 08:16

## 2025-02-18 RX ADMIN — ATENOLOL 25 MG: 25 TABLET ORAL at 08:16

## 2025-02-18 RX ADMIN — INSULIN LISPRO 1 UNITS: 100 INJECTION, SOLUTION INTRAVENOUS; SUBCUTANEOUS at 08:18

## 2025-02-18 RX ADMIN — ENOXAPARIN SODIUM 40 MG: 40 INJECTION SUBCUTANEOUS at 08:16

## 2025-02-18 RX ADMIN — Medication 100 MG: at 08:16

## 2025-02-18 RX ADMIN — MULTIPLE VITAMINS W/ MINERALS TAB 1 TABLET: TAB ORAL at 08:16

## 2025-02-18 RX ADMIN — INSULIN LISPRO 2 UNITS: 100 INJECTION, SOLUTION INTRAVENOUS; SUBCUTANEOUS at 12:31

## 2025-02-18 RX ADMIN — FOLIC ACID 1 MG: 1 TABLET ORAL at 08:16

## 2025-02-18 NOTE — ASSESSMENT & PLAN NOTE
Admitted for alcohol withdrawal on 2/16/2025 and has a history of seizures (2) per sister  Initially seen as a trauma as he was found down, but no traumatic injury identified  CIWA this admission 0-1.  Patient did not meet criteria for transfer to [place].  He did not have any seizures.    Last drink 2/16/2025 at 16:30  Given IVF and repleted folic acid, thiamine, multivitamin  Met with [facility] team and declined inpatient rehab, was given outpatient resources prior to discharge

## 2025-02-18 NOTE — ASSESSMENT & PLAN NOTE
Lab Results   Component Value Date    HGBA1C 6.4 (H) 02/16/2025   Resume metformin at home  Glucose checks with meals and SSI

## 2025-02-18 NOTE — CASE MANAGEMENT
Case Management Discharge Planning Note    Patient name Elpidio Fulton  Location S /S -01 MRN [id number]  : 1975 Date 2025       Current Admission Date: 2025  Current Admission Diagnosis:Alcohol withdrawal (HCC)   Patient Active Problem List    Diagnosis Date Noted Date Diagnosed    Abnormal CAT scan 2025     Anxiety as acute reaction to exceptional stress 10/18/2023     History of stroke 2023     Right pontine stroke (HCC) 2023     Tobacco use 2023     Fatty liver 2022     Alcohol use disorder, moderate, in early remission (HCC) 2021     Asymptomatic gallstones 2021     Mixed hyperlipidemia 2021     Alcohol withdrawal (HCC) 2021     Type 2 diabetes mellitus without complication, without long-term current use of insulin (HCC) 2021     Hypertension 2016       LOS (days): 2  Geometric Mean LOS (GMLOS) (days):   Days to GMLOS:     OBJECTIVE:  Risk of Unplanned Readmission Score: 7.66         Current admission status: Inpatient   Preferred Pharmacy:   [facility] [address]  [address] [place] [address]  Phone: [phone] Fax: [phone]    Primary Care Provider: Rachell Casey MD    Primary Insurance:   Secondary Insurance:     DISCHARGE DETAILS:    [facility]S Representative met with Pt at bedside. Pt was interested in outpatient D&A therapy/resources. Pt will follow up with CRS post d/c.     Pts wife will provide transportation home.

## 2025-02-18 NOTE — ASSESSMENT & PLAN NOTE
Hypertensive on admission likely secondary to withdrawal   Improving  Continue amlodipine and atenolol.

## 2025-02-18 NOTE — ASSESSMENT & PLAN NOTE
CT chest, abdomen, pelvis showing incidental finding  1.) at site of posterior right kidney mid portion, there is an indeterminate 1.0 cm hypodense lesion.  Suggest initial attempted visualization with nonemergent renal ultrasound to evaluate whether this represents a cyst  2.) a 1.2 x 0.9 cm nodule in the prevascular region could represent a prominent lymph node cannot exclude a small thymoma.  Recommend 3-month follow-up unenhanced chest CT  Findings were discussed with family by admitting provider and outpatient follow up was recommended.

## 2025-02-18 NOTE — DISCHARGE SUMMARY
Discharge Summary - Hospitalist   Name: Elpidio Fulton 49 y.o. male I MRN: [id number]  Unit/Bed#: S [facility] I Date of Admission: 2/16/2025   Date of Service: 2/18/2025 I Hospital Day: 2       Reason for Admission: Alcohol withdrawal    Hospital Course:   Elpidio Fulton is a 49 y.o. male with PMH HTN, Type 2 DM, CVA and alcohol abuse who originally presented to the hospital on 2/16/2025 due to fall at home while intoxicated.  Trauma work up was unrevealing for injury.  Blood work was significant for metabolic acidosis on admission with low lytes.  He was started on IVF and repleted.  He was also put on CIWA protocol given history of withdrawal seizures.  CIWA arranged from 0-1.  Patient initially told me he wanted to go to inpatient rehab, but later, he said that he prefers outpatient.  He met with [facility]ATCH member prior to discharge for local outpatient resources.    Please see list of diagnoses below and related plan for additional information.     Condition at Discharge:  good  Assessment & Plan  Alcohol withdrawal ([facility])  Admitted for alcohol withdrawal on 2/16/2025 and has a history of seizures (2) per sister  Initially seen as a trauma as he was found down, but no traumatic injury identified  CIWA this admission 0-1.  Patient did not meet criteria for transfer to [place].  He did not have any seizures.    Last drink 2/16/2025 at 16:30  Given IVF and repleted folic acid, thiamine, multivitamin  Met with [facility] team and declined inpatient rehab, was given outpatient resources prior to discharge  Hypertension  Hypertensive on admission likely secondary to withdrawal   Improving  Continue amlodipine and atenolol.  Type 2 diabetes mellitus without complication, without long-term current use of insulin ([facility])    Lab Results   Component Value Date    HGBA1C 6.4 (H) 02/16/2025   Resume metformin at home  Glucose checks with meals and SSI  Mixed hyperlipidemia  Continue statin  Right pontine stroke  (HCC)  History of right pontine stroke in June 2023  Sister is worried that this was also a consequence of alcohol withdrawal  Patient follows up with neurology in the outpatient setting  Continue daily ASA and statin  Tobacco use  Nicotine patch while admitted and prescribed on dc  Abnormal CAT scan  CT chest, abdomen, pelvis showing incidental finding  1.) at site of posterior right kidney mid portion, there is an indeterminate 1.0 cm hypodense lesion.  Suggest initial attempted visualization with nonemergent renal ultrasound to evaluate whether this represents a cyst  2.) a 1.2 x 0.9 cm nodule in the prevascular region could represent a prominent lymph node cannot exclude a small thymoma.  Recommend 3-month follow-up unenhanced chest CT  Findings were discussed with family by admitting provider and outpatient follow up was recommended.      Medical Problems       Resolved Problems  Date Reviewed: 8/2/2024          Resolved    High anion gap metabolic acidosis 2/17/2025     Resolved by  Antonieta Muir MD        Discharging Physician / Practitioner: Antonieta Muir MD  PCP: Rachell Casey MD  Admission Date:   Admission Orders (From admission, onward)       Ordered        02/16/25 2132  INPATIENT ADMISSION  Once                          Discharge Date: 02/18/25    Consultations During Hospital Stay:  None    Procedures Performed:   None    Significant Findings / Test Results:   See Incidental Findings note    Incidental Findings:    See Incidental Findings note  Findings were reviewed with patient and his family by admitting provider.    Test Results Pending at Discharge (will require follow up):   None     Outpatient Tests Requested:  None    Complications:  None    Discharge Day Visit / Exam:   Subjective:  Patient is sitting upright in bed today, reports feeling well, denies any withdrawal symptoms.  Specifically, he denies headache, anxiety, nausea/vomiting, itching or skin crawling, hallucinations,  shakiness, and seizures.  Yesterday when his sister was present, he did report that he would like to go to rehab, but today he says he would like to go home.  He is awaiting evaluation/discussion with [facility] program.  He did not meet criteria for transfer to [place] for inpatient detox.    Vitals: Blood Pressure: 139/85 (02/18/25 1100)  Pulse: 57 (02/18/25 1100)  Temperature: 98 °F (36.7 °C) (02/18/25 1056)  Temp Source: Oral (02/18/25 1056)  Respirations: 16 (02/18/25 1056)  Weight - Scale: 87.6 kg (193 lb 2 oz) (02/16/25 1845)  SpO2: 96 % (02/18/25 1056)  Physical Exam   Constitutional:       General: He is not in acute distress.     Appearance: Normal appearance. He is not diaphoretic. He is not ill-appearing.   HENT:      Head: Normocephalic and atraumatic.      Nose: Nose normal.      Mouth/Throat:      Mouth: Mucous membranes are moist.      Pharynx: No posterior oropharyngeal erythema.   Eyes:      General: No scleral icterus.     Extraocular Movements: Extraocular movements intact.      Pupils: Pupils are equal, round, and reactive to light.   Cardiovascular:      Rate and Rhythm: Regular rate and rhythm.      Heart sounds: No murmur heard.  Pulmonary:      Effort: Pulmonary effort is normal. No respiratory distress.      Breath sounds: No wheezing, rhonchi or rales.   Abdominal:      Palpations: Abdomen is soft.      Comments: Nondistended, nontender, no fluid, no organomegaly  Musculoskeletal:      Right lower leg: No edema.      Left lower leg: No edema.   Skin:     Coloration: Skin is not jaundiced.      Findings: No erythema.   Neurological:      General: No focal deficit present.      Mental Status: He is alert.     Discussion with Family: Patient declined call to .     Discharge instructions/Information to patient and family:   See after visit summary for information provided to patient and family.      Provisions for Follow-Up Care:  See after visit summary for information related  to follow-up care and any pertinent home health orders.      Mobility at time of Discharge:   Basic Mobility Inpatient Raw Score: 24  JH-HLM Goal: 8: Walk 250 feet or more  JH-HLM Achieved: 8: Walk 250 feet ot more  HLM Goal achieved. Continue to encourage appropriate mobility.     Disposition:   Home    Planned Readmission: No    Discharge Medications:  See after visit summary for reconciled discharge medications provided to patient and/or family.      Administrative Statements   Discharge Statement:  I have spent a total time of 28 minutes in caring for this patient on the day of the visit/encounter. .    **Please Note: This note may have been constructed using a voice recognition system**

## 2025-02-18 NOTE — ED NOTES
SBAR sent to assigned Nurse, patient is off to the Unit, AAOx3 resp even and unlabored with no S$S of distress.      Cassia Pino RN  02/17/25 [id number]

## 2025-04-10 ENCOUNTER — OFFICE VISIT (OUTPATIENT)
Dept: FAMILY MEDICINE CLINIC | Facility: CLINIC | Age: 50
End: 2025-04-10

## 2025-04-10 VITALS
HEIGHT: 65 IN | WEIGHT: 187.8 LBS | SYSTOLIC BLOOD PRESSURE: 126 MMHG | HEART RATE: 84 BPM | DIASTOLIC BLOOD PRESSURE: 80 MMHG | TEMPERATURE: 98.2 F | OXYGEN SATURATION: 97 % | RESPIRATION RATE: 18 BRPM | BODY MASS INDEX: 31.29 KG/M2

## 2025-04-10 DIAGNOSIS — Z13.5 ENCOUNTER FOR SCREENING FOR DIABETIC RETINOPATHY: ICD-10-CM

## 2025-04-10 DIAGNOSIS — I10 PRIMARY HYPERTENSION: ICD-10-CM

## 2025-04-10 DIAGNOSIS — Z12.5 PROSTATE CANCER SCREENING: ICD-10-CM

## 2025-04-10 DIAGNOSIS — R93.89 ABNORMAL CHEST CT: ICD-10-CM

## 2025-04-10 DIAGNOSIS — E78.2 MIXED HYPERLIPIDEMIA: ICD-10-CM

## 2025-04-10 DIAGNOSIS — N28.9 RENAL LESION: ICD-10-CM

## 2025-04-10 DIAGNOSIS — E11.9 TYPE 2 DIABETES MELLITUS WITHOUT COMPLICATION, WITHOUT LONG-TERM CURRENT USE OF INSULIN (HCC): Primary | Chronic | ICD-10-CM

## 2025-04-10 PROCEDURE — 99214 OFFICE O/P EST MOD 30 MIN: CPT | Performed by: FAMILY MEDICINE

## 2025-04-10 RX ORDER — METFORMIN HYDROCHLORIDE 500 MG/1
1500 TABLET, EXTENDED RELEASE ORAL
Qty: 270 TABLET | Refills: 3 | Status: SHIPPED | OUTPATIENT
Start: 2025-04-10

## 2025-04-10 NOTE — ASSESSMENT & PLAN NOTE
Right indeterminate 1.0 cm hypodense lesion  Incidental finding  on CT CAP 2/25  Radiologist recommends nonemergent renal ultrasound to confirm diagnosis of cyst  Orders:  •  US kidney and bladder; Future

## 2025-04-10 NOTE — PROGRESS NOTES
"Name: Elpidio Fulton      : 1975      MRN: [id number]  Encounter Provider: Rachell Casey MD  Encounter Date: 4/10/2025   Encounter department: [facility]    Assessment & Plan  Encounter for screening for diabetic retinopathy    Orders:  •  IRIS Diabetic eye exam    Type 2 diabetes mellitus without complication, without long-term current use of insulin (HCC)    Lab Results   Component Value Date    HGBA1C 6.4 (H) 2025   Good glycemic control  Continue metformin  Blood work is due in August    Orders:  •  metFORMIN (GLUCOPHAGE-XR) 500 mg 24 hr tablet; Take 3 tablets (1,500 mg total) by mouth daily with dinner  •  CBC and differential; Future  •  Comprehensive metabolic panel; Future  •  Hemoglobin A1C; Future  •  Albumin / creatinine urine ratio; Future    Primary hypertension  Blood pressure is well-controlled.  Continue current Rx including Amlodipine and atenolol       Renal lesion  Right indeterminate 1.0 cm hypodense lesion  Incidental finding  on CT CAP   Radiologist recommends nonemergent renal ultrasound to confirm diagnosis of cyst  Orders:  •  US kidney and bladder; Future    Abnormal chest CT  Incidental finding during recent hospitalization 2025  A 1.2 x 0.9 cm nodule in the prevascular region could represent a prominent lymph node, cannot exclude a small thymoma. Recommend a 3-month follow-up unenhanced chest CT.     Orders:  •  CT chest wo contrast; Future    Mixed hyperlipidemia  Continue rosuvastatin 5 mg daily  Orders:  •  TSH, 3rd generation; Future  •  Lipid panel; Future    Prostate cancer screening    Orders:  •  PSA, Total Screen; Future       Patient Instructions   I will be happy to fill out health maintenance/medication maintenance form for state insurance  Please schedule CT chest and kidney ultrasound, \"\"  Blood work is due in August    Follow-up diagnostic results of blood work in August    History of Present Illness "     Follow-up chronic medical conditions.    Patient is here today accompanied by his wife.    Has been sober since recent hospitalization back in February 2025.  He presented after fall at home while intoxicated.  Still smokes, 1/2 ppd, interested in cessation   The patient has been compliant with Rx for hypertension, hyperlipidemia and type 2 diabetes   Hospital records reviewed.    CT CAP   1. No findings of acute traumatic injury in the chest, abdomen or pelvis.   2. At the site of a posterior right kidney midportion, there is and indeterminate 1.0 cm hypodense lesion. Suggest initial attempted visualization with nonemergent renal ultrasound to evaluate whether this represents a cyst.   3. A 1.2 x 0.9 cm nodule in the prevascular region could represent a prominent lymph node, cannot exclude a small thymoma. Recommend a 3-month follow-up unenhanced chest CT.           Review of Systems   Constitutional: Negative.    HENT: Negative.     Respiratory: Negative.     Cardiovascular: Negative.    Gastrointestinal: Negative.    Endocrine: Negative.    Genitourinary: Negative.    Musculoskeletal: Negative.    Allergic/Immunologic: Negative.    Neurological: Negative.    Hematological: Negative.    Psychiatric/Behavioral: Negative.       Past Medical History:   Diagnosis Date   • Acute lymphangitis of left upper limb 11/14/2016   • Alcohol withdrawal seizure (HCC) 07/31/2021   • Diabetes mellitus (HCC) 06/02/2023   • Difficulty walking 06/02/2023   • Elevated LFTs 07/31/2021   • Hypertension    • Movement disorder 06/02/2023   • Stroke (HCC) 06/02/2023     History reviewed. No pertinent surgical history.  History reviewed. No pertinent family history.  Social History     Tobacco Use   • Smoking status: Every Day     Current packs/day: 0.50     Average packs/day: 0.5 packs/day for 22.3 years (11.1 ttl pk-yrs)     Types: Cigarettes     Start date: 1/1/2003   • Smokeless tobacco: Never   Vaping Use   • Vaping status: Never  "Used   Substance and Sexual Activity   • Alcohol use: Not Currently   • Drug use: No   • Sexual activity: Yes     Partners: Female     Birth control/protection: None     Current Outpatient Medications on File Prior to Visit   Medication Sig   • Alcohol Swabs 70 % PADS May substitute brand based on insurance coverage. Check glucose BID.   • amLODIPine (NORVASC) 5 mg tablet Take 1 tablet (5 mg total) by mouth daily   • aspirin 81 mg chewable tablet Chew 1 tablet (81 mg total) daily Do not start before June 7, 2023.   • atenolol (TENORMIN) 25 mg tablet TAKE ONE TABLET BY MOUTH EVERY DAY   • Blood Glucose Monitoring Suppl (OneTouch Verio Reflect) w/Device KIT May substitute brand based on insurance coverage. Check glucose BID.   • glucose blood (OneTouch Verio) test strip May substitute brand based on insurance coverage. Check glucose BID.   • methocarbamol (ROBAXIN) 750 mg tablet TAKE ONE TABLET BY MOUTH EVERY DAY AS NEEDED FOR MUSCLE SPASMS   • nicotine (NICODERM CQ) 7 mg/24hr TD 24 hr patch Place 1 patch on the skin over 24 hours daily Apply a new patch every 24 hours to a clean, dry, hairless site on the upper arm or hip.   • rosuvastatin (CRESTOR) 5 mg tablet TAKE ONE TABLET BY MOUTH EVERY DAY   • thiamine 100 MG tablet Take 1 tablet (100 mg total) by mouth daily     No Known Allergies  Immunization History   Administered Date(s) Administered   • Influenza Quadrivalent Preservative Free 3 years and older IM 09/22/2016   • Tdap 02/16/2025     Objective   /80 (BP Location: Left arm, Patient Position: Sitting, Cuff Size: Standard)   Pulse 84   Temp 98.2 °F (36.8 °C) (Temporal)   Resp 18   Ht 5' 5\" (1.651 m)   Wt 85.2 kg (187 lb 12.8 oz)   SpO2 97%   BMI 31.25 kg/m²     Physical Exam  Vitals and nursing note reviewed.   Constitutional:       General: He is not in acute distress.     Appearance: Normal appearance. He is well-developed. He is not ill-appearing.   HENT:      Head: Normocephalic and " atraumatic.   Eyes:      General: No scleral icterus.     Conjunctiva/sclera: Conjunctivae normal.   Neck:      Vascular: No carotid bruit.   Cardiovascular:      Rate and Rhythm: Normal rate and regular rhythm.      Pulses: no weak pulses.           Dorsalis pedis pulses are 2+ on the right side and 2+ on the left side.      Heart sounds: Normal heart sounds. No murmur heard.  Pulmonary:      Effort: Pulmonary effort is normal. No respiratory distress.      Breath sounds: Normal breath sounds. No wheezing or rales.   Abdominal:      General: Bowel sounds are normal. There is no abdominal bruit.   Musculoskeletal:         General: Normal range of motion.      Cervical back: Neck supple.   Feet:      Right foot:      Skin integrity: No ulcer, skin breakdown, erythema, warmth, callus or dry skin.      Left foot:      Skin integrity: No ulcer, skin breakdown, erythema, warmth, callus or dry skin.   Neurological:      General: No focal deficit present.      Mental Status: He is alert and oriented to person, place, and time.      Cranial Nerves: No cranial nerve deficit.   Psychiatric:         Mood and Affect: Mood normal.         Behavior: Behavior normal.     Diabetic Foot Exam    Patient's shoes and socks removed.    Right Foot/Ankle   Right Foot Inspection  Skin Exam: skin normal and skin intact. No dry skin, no warmth, no callus, no erythema, no maceration, no abnormal color, no pre-ulcer, no ulcer and no callus.     Toe Exam: ROM and strength within normal limits.     Sensory   Vibration: intact  Proprioception: intact  Monofilament testing: intact    Vascular  The right DP pulse is 2+.     Left Foot/Ankle  Left Foot Inspection  Skin Exam: skin normal and skin intact. No dry skin, no warmth, no erythema, no maceration, normal color, no pre-ulcer, no ulcer and no callus.     Toe Exam: ROM and strength within normal limits.     Sensory   Vibration: intact  Proprioception: intact  Monofilament testing:  intact    Vascular  The left DP pulse is 2+.     Assign Risk Category  No deformity present  No loss of protective sensation  No weak pulses  Risk: 0

## 2025-04-10 NOTE — ASSESSMENT & PLAN NOTE
Incidental finding during recent hospitalization February 2025  A 1.2 x 0.9 cm nodule in the prevascular region could represent a prominent lymph node, cannot exclude a small thymoma. Recommend a 3-month follow-up unenhanced chest CT.     Orders:  •  CT chest wo contrast; Future

## 2025-04-10 NOTE — ASSESSMENT & PLAN NOTE
Lab Results   Component Value Date    HGBA1C 6.4 (H) 02/16/2025   Good glycemic control  Continue metformin  Blood work is due in August    Orders:  •  metFORMIN (GLUCOPHAGE-XR) 500 mg 24 hr tablet; Take 3 tablets (1,500 mg total) by mouth daily with dinner  •  CBC and differential; Future  •  Comprehensive metabolic panel; Future  •  Hemoglobin A1C; Future  •  Albumin / creatinine urine ratio; Future

## 2025-07-10 DIAGNOSIS — I10 PRIMARY HYPERTENSION: ICD-10-CM

## 2025-07-11 RX ORDER — ATENOLOL 25 MG/1
25 TABLET ORAL DAILY
Qty: 90 TABLET | Refills: 1 | Status: SHIPPED | OUTPATIENT
Start: 2025-07-11

## 2025-07-22 DIAGNOSIS — I10 PRIMARY HYPERTENSION: ICD-10-CM

## 2025-07-24 RX ORDER — AMLODIPINE BESYLATE 5 MG/1
5 TABLET ORAL DAILY
Qty: 100 TABLET | Refills: 1 | Status: SHIPPED | OUTPATIENT
Start: 2025-07-24